# Patient Record
Sex: FEMALE | Race: BLACK OR AFRICAN AMERICAN | NOT HISPANIC OR LATINO | URBAN - METROPOLITAN AREA
[De-identification: names, ages, dates, MRNs, and addresses within clinical notes are randomized per-mention and may not be internally consistent; named-entity substitution may affect disease eponyms.]

---

## 2018-06-19 ENCOUNTER — EMERGENCY (EMERGENCY)
Facility: HOSPITAL | Age: 78
LOS: 1 days | Discharge: HOME | End: 2018-06-19
Attending: EMERGENCY MEDICINE | Admitting: EMERGENCY MEDICINE
Payer: MEDICARE

## 2018-06-19 VITALS
RESPIRATION RATE: 16 BRPM | DIASTOLIC BLOOD PRESSURE: 76 MMHG | SYSTOLIC BLOOD PRESSURE: 137 MMHG | OXYGEN SATURATION: 100 % | TEMPERATURE: 98 F | HEART RATE: 60 BPM

## 2018-06-19 PROCEDURE — 73562 X-RAY EXAM OF KNEE 3: CPT | Mod: 26,50

## 2018-06-19 PROCEDURE — 73590 X-RAY EXAM OF LOWER LEG: CPT | Mod: 26,RT

## 2018-06-19 PROCEDURE — 73610 X-RAY EXAM OF ANKLE: CPT | Mod: 26,RT

## 2018-06-19 PROCEDURE — 99284 EMERGENCY DEPT VISIT MOD MDM: CPT | Mod: GC

## 2018-06-19 NOTE — ED PROVIDER NOTE - MEDICAL DECISION MAKING DETAILS
78yo hx of htn and dementia presenting with right ankle and PRIYA knee pain this morning. Complaining of PRIYA knee pain after walking and subjective tenderness in right knee. Walking without assist. Likely benign vs muscular vs arthritis. Will xray PRIYA knees and right ankle and tib fib concern for fracture as patient has baseline dementia and is not great at explaining history and symptoms.

## 2018-06-19 NOTE — ED PROVIDER NOTE - ATTENDING CONTRIBUTION TO CARE
Patient presents with intermittent aching r. ankle pain and b/l knee pain that started since walking around yesterday. No trauma, no redness, no fevers, able to walk. Has h/o knee pain 2/2 arthritis in past but today felt worse than before, took tylenol prior to coming, now feeling better. Denies ha, cp, sob, abd pain, vomiting, diarrhea, dysuria, or fevers. HHA at bedside.  exam  GEN - NAD; well appearing; A+O x3   HEAD - NC/AT   EYES- PERRL, EOMI  ENT: Airway patent, mmm, Oral cavity and pharynx normal. No inflammation, swelling, exudate, or lesions.  NECK: Neck supple, non-tender without lymphadenopathy, no masses.  PULMONARY - CTA b/l, symmetric breath sounds.   CARDIAC -s1s2, RRR, no M,G,R  ABDOMEN - +BS, ND, NT, soft, no guarding, no rebound, no masses   BACK - no CVA tenderness, Normal  spine   EXTREMITIES - b/l le knee exams with from, no point ttp, no swelling, no overlying skin changes, ext mechanism intact, r. ankle with no swelling, no point ttp, from with mild pain, no crepitus, compartments soft, remainder of ext normal, symmetric pulses, capillary refill < 2 seconds, no edema   SKIN - no rash or bruising   NEUROLOGIC - alert, speech clear, no focal deficits  PSYCH -nl mood/affect, nl insight.  a/p-patient with atraumatic b/l knee pain and r. ankle pain.  mild reproducible pain on ranging, no other abnormal physical exam findings, pain improved, patient ambulatory, vss, will check xrs, likely ortho f/u.

## 2018-06-19 NOTE — ED PROVIDER NOTE - OBJECTIVE STATEMENT
76yo hx of htn and dementia presenting with right ankle and PRIYA knee pain this morning. Was walking fine yesterday, no issues, not needing assist or walker per aide. Lives at home, 24hr aide. This morning, stopped walking, complaining of pain in right knee and ankle. No witnessed fall, no recent trauma. No fevers, chills, otherwise acting normal, no other complaints, no urinary symptoms.

## 2018-06-19 NOTE — ED PROVIDER NOTE - PHYSICAL EXAMINATION
Gen: No acute distress, alert, cooperative  Head: Normocephalic, Atraumatic  HEENT: PERRL, normal conjunctiva  Lung: CTAB, no respiratory distress, no crackles or wheezes  CV: rrr, no murmur  Abd: soft, NTND, no rebound or guarding  MSK: No LE edema, no ankle or knee effusions PRIYA. Initially, no tenderness in ankles or knees, got patient up and walking and patient complains of PRIYA knee pain. Once laying down, complains of PRIYA knee tenderness diffusely, right worse than left. Full ROM PRIYA hips, knees, ankles. DP pulses intact  Neuro: No focal neurologic deficits, normal strength and sensation all extremities. Walking with normal gait, just more gingerly due to pain in knees per patient. Walking with no assist in room.   Skin: Warm and dry, no evidence of rash   Psych: normal affect, follows commands

## 2018-06-19 NOTE — ED ADULT TRIAGE NOTE - CHIEF COMPLAINT QUOTE
brought in by ambulance from home  A&OX3 c/o right ankle pain, as per aide pt was walking yesterday when she twisted it, no redness or swelling noted pt has hx of dementia

## 2018-06-19 NOTE — ED PROVIDER NOTE - PROGRESS NOTE DETAILS
All results d/w patient and hha, copies given with instructions to bring with them to their follow up appointment.  The patient was given verbal and written discharge instructions Specifically, instructions when to return to the ED and to seek follow-up from their pcp within 1-2 days. Any specialty follow-up was discussed, including how to make an appointment.  Instructions were discussed in simple, plain language and was understood by the patient. The patient understands that should their symptoms worsen or any new symptoms arise, they should return to the ED immediately for further evaluation.  Vss, NAD, tolerating po and ambulating steadily at discharge.

## 2018-09-11 ENCOUNTER — EMERGENCY (EMERGENCY)
Facility: HOSPITAL | Age: 78
LOS: 1 days | Discharge: HOME | End: 2018-09-11
Attending: EMERGENCY MEDICINE | Admitting: EMERGENCY MEDICINE
Payer: MEDICARE

## 2018-09-11 VITALS
HEART RATE: 96 BPM | SYSTOLIC BLOOD PRESSURE: 118 MMHG | TEMPERATURE: 98 F | DIASTOLIC BLOOD PRESSURE: 75 MMHG | RESPIRATION RATE: 18 BRPM

## 2018-09-11 PROCEDURE — 99283 EMERGENCY DEPT VISIT LOW MDM: CPT

## 2018-09-11 RX ORDER — POLYMYXIN B SULF/TRIMETHOPRIM 10000-1/ML
1 DROPS OPHTHALMIC (EYE) ONCE
Refills: 0 | Status: DISCONTINUED | OUTPATIENT
Start: 2018-09-11 | End: 2018-09-15

## 2018-09-11 NOTE — ED PROVIDER NOTE - MEDICAL DECISION MAKING DETAILS
79 y/o female with PMhx of Dementia, HTN, and HLD presenting to the ED with her aid for b/l eye redness and discharge. Concern for conjunctivitis. Antibiotics.

## 2018-09-11 NOTE — ED SUB INTERN NOTE - PHYSICAL EXAMINATION
Pt appears well, not in distress. No periorbital swelling or swelling of eyelids. Mild conjunctival redness noted, no discharge. Pupils round, reactive to light. EOM intact.

## 2018-09-11 NOTE — ED SUB INTERN NOTE - OBJECTIVE STATEMENT FT
Ms. Dunaway is a 77yo female with PMHx of HTN and dementia who presents today with b/l eye redness and discharge. Woke up with redness and discharge yesterday, denies any pain or trauma. C/o itchiness, denies vision changes, photophobia, fever, n/v, headache, dizziness, rhinorrhea, coughing. Wears reading glasses, does not wear contacts. Saw opthalmology months ago, exam normal. Denies sick contacts. Has not tried medications or eye drops.

## 2018-09-11 NOTE — ED PROVIDER NOTE - OBJECTIVE STATEMENT
79 y/o female with PMhx of Dementia, HTN, and HLD presenting to the ED with her aid for b/l eye redness and discharge. Aid notes pt has been having some crusting to left eye with clear drainage and then noted right today. Pt admits to itching of eye. Pt denies any vision changes. Pt denies any eye pain. Pt denies any fever, chills, nausea, vomiting, SOB, chest pain, or abd pain.

## 2018-09-11 NOTE — ED SUB INTERN NOTE - MEDICAL DECISION MAKING DETAILS
Ms. Dunaway is a 79yo female who presents today with eye redness and discharge for 2days. Most likely viral/bacterial conjunctivitis - b/l redness, discharge. Possible allergic conjunctivitis. Less likely keratitis or corneal abrasion - denies pain. Will treat with bactrim/polymyxin eye drops, encourage f/u with opthalmology.

## 2019-07-03 ENCOUNTER — EMERGENCY (EMERGENCY)
Facility: HOSPITAL | Age: 79
LOS: 1 days | Discharge: HOME | End: 2019-07-03
Attending: EMERGENCY MEDICINE | Admitting: EMERGENCY MEDICINE
Payer: MEDICARE

## 2019-07-03 VITALS
DIASTOLIC BLOOD PRESSURE: 70 MMHG | RESPIRATION RATE: 16 BRPM | HEART RATE: 65 BPM | SYSTOLIC BLOOD PRESSURE: 134 MMHG | OXYGEN SATURATION: 96 % | TEMPERATURE: 98 F

## 2019-07-03 PROCEDURE — 29125 APPL SHORT ARM SPLINT STATIC: CPT | Mod: RT

## 2019-07-03 PROCEDURE — 99283 EMERGENCY DEPT VISIT LOW MDM: CPT | Mod: 25

## 2019-07-03 PROCEDURE — 73130 X-RAY EXAM OF HAND: CPT | Mod: 26,RT

## 2019-07-03 NOTE — ED PROVIDER NOTE - PHYSICAL EXAMINATION
Attending/Caio: NAD, Head-atraumatic, no cspine PT; supple; RRR; CTAB; Abd-soft, NT/ND; A&Ox1, nonfocal; all joints FROM; Rt hand-+small eccyhmosis thenar, min STS, +swellng PIP1st digit, MCL intact, + cap refill < 2 sec; LUE-superficial abrasion, no STS

## 2019-07-03 NOTE — ED PROVIDER NOTE - OBJECTIVE STATEMENT
Attending/Caio: 79 yo F w/ h/o dementia, requires assistance with ADLs, HTN brought to the ED by her daughter for right hand bruising. Pt denies acute complaints, cannot provide history. No witnessed falls.

## 2019-07-03 NOTE — ED ADULT TRIAGE NOTE - CHIEF COMPLAINT QUOTE
Pt c/o right thumb swelling since this morning.  Pt unable to say if she had fall/trauma 2/2 dementia.

## 2019-07-03 NOTE — ED PROVIDER NOTE - PROGRESS NOTE DETAILS
Caio: reviewed xray results with Dr. Chowdhury covering hand. Agreed with thumb spica and will have the patient's daughter call his office this Friday morning for follow up Caio: discussed test results and dispo plan with patent's daughter

## 2019-07-03 NOTE — ED PROVIDER NOTE - CARE PROVIDER_API CALL
Donte Wolf (MD)  Plastic Surgery; Surgery; Surgical Critical Care  [address] [place] [address]  Phone: [phone]  Fax: [phone]  Follow Up Time:

## 2021-12-06 ENCOUNTER — INPATIENT (INPATIENT)
Facility: HOSPITAL | Age: 81
LOS: 6 days | Discharge: NURSING, REHAB OR LONG-TERM CARE | End: 2021-12-13
Attending: INTERNAL MEDICINE | Admitting: INTERNAL MEDICINE
Payer: MEDICARE

## 2021-12-06 VITALS
TEMPERATURE: 96 F | OXYGEN SATURATION: 84 % | SYSTOLIC BLOOD PRESSURE: 83 MMHG | DIASTOLIC BLOOD PRESSURE: 53 MMHG | HEART RATE: 104 BPM | RESPIRATION RATE: 18 BRPM

## 2021-12-06 DIAGNOSIS — N17.9 ACUTE KIDNEY FAILURE, UNSPECIFIED: ICD-10-CM

## 2021-12-06 DIAGNOSIS — F03.90 UNSPECIFIED DEMENTIA WITHOUT BEHAVIORAL DISTURBANCE: ICD-10-CM

## 2021-12-06 DIAGNOSIS — Z29.9 ENCOUNTER FOR PROPHYLACTIC MEASURES, UNSPECIFIED: ICD-10-CM

## 2021-12-06 DIAGNOSIS — A41.9 SEPSIS, UNSPECIFIED ORGANISM: ICD-10-CM

## 2021-12-06 DIAGNOSIS — Z98.890 OTHER SPECIFIED POSTPROCEDURAL STATES: Chronic | ICD-10-CM

## 2021-12-06 DIAGNOSIS — S70.229A: ICD-10-CM

## 2021-12-06 DIAGNOSIS — I63.9 CEREBRAL INFARCTION, UNSPECIFIED: ICD-10-CM

## 2021-12-06 DIAGNOSIS — E78.5 HYPERLIPIDEMIA, UNSPECIFIED: ICD-10-CM

## 2021-12-06 DIAGNOSIS — I10 ESSENTIAL (PRIMARY) HYPERTENSION: ICD-10-CM

## 2021-12-06 LAB
ALBUMIN SERPL ELPH-MCNC: 3.5 G/DL — SIGNIFICANT CHANGE UP (ref 3.3–5)
ALP SERPL-CCNC: 91 U/L — SIGNIFICANT CHANGE UP (ref 40–120)
ALT FLD-CCNC: 21 U/L — SIGNIFICANT CHANGE UP (ref 4–33)
ANION GAP SERPL CALC-SCNC: 14 MMOL/L — SIGNIFICANT CHANGE UP (ref 7–14)
APPEARANCE UR: CLEAR — SIGNIFICANT CHANGE UP
APTT BLD: 25.9 SEC — LOW (ref 27–36.3)
AST SERPL-CCNC: 30 U/L — SIGNIFICANT CHANGE UP (ref 4–32)
BACTERIA # UR AUTO: ABNORMAL
BASE EXCESS BLDV CALC-SCNC: -0.3 MMOL/L — SIGNIFICANT CHANGE UP (ref -2–3)
BASE EXCESS BLDV CALC-SCNC: 2.2 MMOL/L — SIGNIFICANT CHANGE UP (ref -2–3)
BASOPHILS # BLD AUTO: 0.03 K/UL — SIGNIFICANT CHANGE UP (ref 0–0.2)
BASOPHILS NFR BLD AUTO: 0.2 % — SIGNIFICANT CHANGE UP (ref 0–2)
BILIRUB SERPL-MCNC: 0.3 MG/DL — SIGNIFICANT CHANGE UP (ref 0.2–1.2)
BILIRUB UR-MCNC: NEGATIVE — SIGNIFICANT CHANGE UP
BLOOD GAS VENOUS COMPREHENSIVE RESULT: SIGNIFICANT CHANGE UP
BLOOD GAS VENOUS COMPREHENSIVE RESULT: SIGNIFICANT CHANGE UP
BUN SERPL-MCNC: 24 MG/DL — HIGH (ref 7–23)
CALCIUM SERPL-MCNC: 9.6 MG/DL — SIGNIFICANT CHANGE UP (ref 8.4–10.5)
CHLORIDE BLDV-SCNC: 106 MMOL/L — SIGNIFICANT CHANGE UP (ref 96–108)
CHLORIDE BLDV-SCNC: 106 MMOL/L — SIGNIFICANT CHANGE UP (ref 96–108)
CHLORIDE SERPL-SCNC: 105 MMOL/L — SIGNIFICANT CHANGE UP (ref 98–107)
CO2 BLDV-SCNC: 27.4 MMOL/L — HIGH (ref 22–26)
CO2 BLDV-SCNC: 30.8 MMOL/L — HIGH (ref 22–26)
CO2 SERPL-SCNC: 25 MMOL/L — SIGNIFICANT CHANGE UP (ref 22–31)
COLOR SPEC: YELLOW — SIGNIFICANT CHANGE UP
CREAT SERPL-MCNC: 1.7 MG/DL — HIGH (ref 0.5–1.3)
CRP SERPL-MCNC: 124.2 MG/L — HIGH
DIFF PNL FLD: ABNORMAL
EOSINOPHIL # BLD AUTO: 0.03 K/UL — SIGNIFICANT CHANGE UP (ref 0–0.5)
EOSINOPHIL NFR BLD AUTO: 0.2 % — SIGNIFICANT CHANGE UP (ref 0–6)
EPI CELLS # UR: 4 /HPF — SIGNIFICANT CHANGE UP (ref 0–5)
GAS PNL BLDV: 140 MMOL/L — SIGNIFICANT CHANGE UP (ref 136–145)
GAS PNL BLDV: 142 MMOL/L — SIGNIFICANT CHANGE UP (ref 136–145)
GAS PNL BLDV: SIGNIFICANT CHANGE UP
GLUCOSE BLDV-MCNC: 114 MG/DL — HIGH (ref 70–99)
GLUCOSE BLDV-MCNC: 139 MG/DL — HIGH (ref 70–99)
GLUCOSE SERPL-MCNC: 147 MG/DL — HIGH (ref 70–99)
GLUCOSE UR QL: NEGATIVE — SIGNIFICANT CHANGE UP
HCO3 BLDV-SCNC: 26 MMOL/L — SIGNIFICANT CHANGE UP (ref 22–29)
HCO3 BLDV-SCNC: 29 MMOL/L — SIGNIFICANT CHANGE UP (ref 22–29)
HCT VFR BLD CALC: 39 % — SIGNIFICANT CHANGE UP (ref 34.5–45)
HCT VFR BLDA CALC: 33 % — LOW (ref 34.5–46.5)
HCT VFR BLDA CALC: 40 % — SIGNIFICANT CHANGE UP (ref 34.5–46.5)
HGB BLD CALC-MCNC: 11 G/DL — LOW (ref 11.5–15.5)
HGB BLD CALC-MCNC: 13.3 G/DL — SIGNIFICANT CHANGE UP (ref 11.5–15.5)
HGB BLD-MCNC: 13.2 G/DL — SIGNIFICANT CHANGE UP (ref 11.5–15.5)
HYALINE CASTS # UR AUTO: 0 /LPF — SIGNIFICANT CHANGE UP (ref 0–7)
IANC: 15.86 K/UL — HIGH (ref 1.5–8.5)
IMM GRANULOCYTES NFR BLD AUTO: 0.4 % — SIGNIFICANT CHANGE UP (ref 0–1.5)
INR BLD: 1.12 RATIO — SIGNIFICANT CHANGE UP (ref 0.88–1.16)
KETONES UR-MCNC: NEGATIVE — SIGNIFICANT CHANGE UP
LACTATE BLDV-MCNC: 2.4 MMOL/L — HIGH (ref 0.5–2)
LACTATE BLDV-MCNC: 3.2 MMOL/L — HIGH (ref 0.5–2)
LEUKOCYTE ESTERASE UR-ACNC: ABNORMAL
LYMPHOCYTES # BLD AUTO: 1.81 K/UL — SIGNIFICANT CHANGE UP (ref 1–3.3)
LYMPHOCYTES # BLD AUTO: 9.3 % — LOW (ref 13–44)
MCHC RBC-ENTMCNC: 29.5 PG — SIGNIFICANT CHANGE UP (ref 27–34)
MCHC RBC-ENTMCNC: 33.8 GM/DL — SIGNIFICANT CHANGE UP (ref 32–36)
MCV RBC AUTO: 87.1 FL — SIGNIFICANT CHANGE UP (ref 80–100)
MONOCYTES # BLD AUTO: 1.56 K/UL — HIGH (ref 0–0.9)
MONOCYTES NFR BLD AUTO: 8.1 % — SIGNIFICANT CHANGE UP (ref 2–14)
NEUTROPHILS # BLD AUTO: 15.86 K/UL — HIGH (ref 1.8–7.4)
NEUTROPHILS NFR BLD AUTO: 81.8 % — HIGH (ref 43–77)
NITRITE UR-MCNC: POSITIVE
NRBC # BLD: 0 /100 WBCS — SIGNIFICANT CHANGE UP
NRBC # FLD: 0 K/UL — SIGNIFICANT CHANGE UP
PCO2 BLDV: 48 MMHG — HIGH (ref 39–42)
PCO2 BLDV: 54 MMHG — HIGH (ref 39–42)
PH BLDV: 7.34 — SIGNIFICANT CHANGE UP (ref 7.32–7.43)
PH BLDV: 7.34 — SIGNIFICANT CHANGE UP (ref 7.32–7.43)
PH UR: 6 — SIGNIFICANT CHANGE UP (ref 5–8)
PLATELET # BLD AUTO: 261 K/UL — SIGNIFICANT CHANGE UP (ref 150–400)
PO2 BLDV: 23 MMHG — SIGNIFICANT CHANGE UP
PO2 BLDV: 24 MMHG — SIGNIFICANT CHANGE UP
POTASSIUM BLDV-SCNC: 4.3 MMOL/L — SIGNIFICANT CHANGE UP (ref 3.5–5.1)
POTASSIUM BLDV-SCNC: 4.7 MMOL/L — SIGNIFICANT CHANGE UP (ref 3.5–5.1)
POTASSIUM SERPL-MCNC: 5 MMOL/L — SIGNIFICANT CHANGE UP (ref 3.5–5.3)
POTASSIUM SERPL-SCNC: 5 MMOL/L — SIGNIFICANT CHANGE UP (ref 3.5–5.3)
PROT SERPL-MCNC: 7.4 G/DL — SIGNIFICANT CHANGE UP (ref 6–8.3)
PROT UR-MCNC: ABNORMAL
PROTHROM AB SERPL-ACNC: 12.8 SEC — SIGNIFICANT CHANGE UP (ref 10.6–13.6)
RBC # BLD: 4.48 M/UL — SIGNIFICANT CHANGE UP (ref 3.8–5.2)
RBC # FLD: 15.1 % — HIGH (ref 10.3–14.5)
RBC CASTS # UR COMP ASSIST: 1 /HPF — SIGNIFICANT CHANGE UP (ref 0–4)
SAO2 % BLDV: 18.6 % — SIGNIFICANT CHANGE UP
SAO2 % BLDV: 29.9 % — SIGNIFICANT CHANGE UP
SARS-COV-2 RNA SPEC QL NAA+PROBE: SIGNIFICANT CHANGE UP
SODIUM SERPL-SCNC: 144 MMOL/L — SIGNIFICANT CHANGE UP (ref 135–145)
SP GR SPEC: >1.05 (ref 1–1.05)
UROBILINOGEN FLD QL: SIGNIFICANT CHANGE UP
WBC # BLD: 19.36 K/UL — HIGH (ref 3.8–10.5)
WBC # FLD AUTO: 19.36 K/UL — HIGH (ref 3.8–10.5)
WBC UR QL: 34 /HPF — HIGH (ref 0–5)

## 2021-12-06 PROCEDURE — 71045 X-RAY EXAM CHEST 1 VIEW: CPT | Mod: 26

## 2021-12-06 PROCEDURE — 74177 CT ABD & PELVIS W/CONTRAST: CPT | Mod: 26,MA

## 2021-12-06 PROCEDURE — 99223 1ST HOSP IP/OBS HIGH 75: CPT | Mod: GC,AI

## 2021-12-06 PROCEDURE — 99285 EMERGENCY DEPT VISIT HI MDM: CPT

## 2021-12-06 RX ORDER — PIPERACILLIN AND TAZOBACTAM 4; .5 G/20ML; G/20ML
3.38 INJECTION, POWDER, LYOPHILIZED, FOR SOLUTION INTRAVENOUS EVERY 12 HOURS
Refills: 0 | Status: DISCONTINUED | OUTPATIENT
Start: 2021-12-06 | End: 2021-12-07

## 2021-12-06 RX ORDER — PIPERACILLIN AND TAZOBACTAM 4; .5 G/20ML; G/20ML
3.38 INJECTION, POWDER, LYOPHILIZED, FOR SOLUTION INTRAVENOUS ONCE
Refills: 0 | Status: COMPLETED | OUTPATIENT
Start: 2021-12-06 | End: 2021-12-06

## 2021-12-06 RX ORDER — SODIUM CHLORIDE 9 MG/ML
1000 INJECTION, SOLUTION INTRAVENOUS
Refills: 0 | Status: DISCONTINUED | OUTPATIENT
Start: 2021-12-06 | End: 2021-12-07

## 2021-12-06 RX ORDER — HEPARIN SODIUM 5000 [USP'U]/ML
5000 INJECTION INTRAVENOUS; SUBCUTANEOUS EVERY 8 HOURS
Refills: 0 | Status: DISCONTINUED | OUTPATIENT
Start: 2021-12-06 | End: 2021-12-09

## 2021-12-06 RX ORDER — PIPERACILLIN AND TAZOBACTAM 4; .5 G/20ML; G/20ML
3.38 INJECTION, POWDER, LYOPHILIZED, FOR SOLUTION INTRAVENOUS EVERY 8 HOURS
Refills: 0 | Status: DISCONTINUED | OUTPATIENT
Start: 2021-12-06 | End: 2021-12-06

## 2021-12-06 RX ORDER — SODIUM CHLORIDE 9 MG/ML
1000 INJECTION INTRAMUSCULAR; INTRAVENOUS; SUBCUTANEOUS ONCE
Refills: 0 | Status: COMPLETED | OUTPATIENT
Start: 2021-12-06 | End: 2021-12-06

## 2021-12-06 RX ORDER — VANCOMYCIN HCL 1 G
1000 VIAL (EA) INTRAVENOUS ONCE
Refills: 0 | Status: COMPLETED | OUTPATIENT
Start: 2021-12-06 | End: 2021-12-06

## 2021-12-06 RX ORDER — INFLUENZA VIRUS VACCINE 15; 15; 15; 15 UG/.5ML; UG/.5ML; UG/.5ML; UG/.5ML
0.7 SUSPENSION INTRAMUSCULAR ONCE
Refills: 0 | Status: DISCONTINUED | OUTPATIENT
Start: 2021-12-06 | End: 2021-12-13

## 2021-12-06 RX ADMIN — SODIUM CHLORIDE 1000 MILLILITER(S): 9 INJECTION INTRAMUSCULAR; INTRAVENOUS; SUBCUTANEOUS at 13:53

## 2021-12-06 RX ADMIN — PIPERACILLIN AND TAZOBACTAM 200 GRAM(S): 4; .5 INJECTION, POWDER, LYOPHILIZED, FOR SOLUTION INTRAVENOUS at 15:32

## 2021-12-06 RX ADMIN — Medication 100 MILLIGRAM(S): at 13:54

## 2021-12-06 RX ADMIN — Medication 250 MILLIGRAM(S): at 17:34

## 2021-12-06 NOTE — H&P ADULT - PROBLEM SELECTOR PLAN 4
- Holding home amlodipine, carvedilol, ramipril i/s/o sepsis Patient with remote history of CVA  - Holding home simvastatin pending swallow eval

## 2021-12-06 NOTE — H&P ADULT - PROBLEM SELECTOR PLAN 2
Patient with history of dementia, AOx1 at baseline  - Holding home memantine pending swallow eval Unclear baseline, today with Cr 1.70  - Send urine Na, Cr  - Spot urine protein/creatinine ratio  - Trend BMP Unclear baseline, today with Cr 1.70  - Send urine Na, Cr  - Spot urine protein/creatinine ratio  - Trend BMP  - Strict Is/Os

## 2021-12-06 NOTE — PATIENT PROFILE ADULT - FALL HARM RISK - HARM RISK INTERVENTIONS

## 2021-12-06 NOTE — H&P ADULT - NSICDXPASTMEDICALHX_GEN_ALL_CORE_FT
PAST MEDICAL HISTORY:  Dementia     HLD (hyperlipidemia)     Hypertension      PAST MEDICAL HISTORY:  CVA (cerebrovascular accident) 2006    Dementia AOx1 at baseline    HLD (hyperlipidemia)     Hypertension

## 2021-12-06 NOTE — ED PROVIDER NOTE - ATTENDING CONTRIBUTION TO CARE
I have personally performed a face to face medical and diagnostic evaluation of the patient. I have discussed with and reviewed the fellow's note and agree with the History, ROS, Physical Exam and MDM unless otherwise indicated. A brief summary of my personal evaluation and impression can be found below.    80yo F hx dementia (AAOx1, minimally communicative at baseline) p/w increased lethargy and dec interactiveness x several days per aide and also developing sores/blisters to b/l hips sacrum x several days that were not present before. No falls.  VITALS: Initial triage and subsequent vitals have been reviewed by me.  Gen: AAOx0, moving spontaneously, intermittently looking around room, NAD, non-toxic  Head: NCAT  HEENT: MMM, normal conjunctiva, anicteric, neck supple  Lung: CTAB, no adventitious sounds  CV: RRR, no murmurs, 2+symmetric peripheral pulses  Abd: soft, NTND, no rebound or guarding, no palpable masses  MSK: No edema, no visible deformities  Neuro: Moving all extremity grossly,   Skin: sacrum slightly erythematous/dark/dusky appearance?, R hip/buttock w/ opened blister and small 1cm eschar, no crepitus  Inc AMS w/ new skin lesions and fever in ED, clinically does not appear highly suspicion for nec fasc but given new lesions w/ blister/eschar over several days, AMS and fever will get labs, surg eval, CT to eval deep sace infx, and empiric abx

## 2021-12-06 NOTE — ED PROVIDER NOTE - OBJECTIVE STATEMENT
81 Yr old female libra Gunderson Dementia, per aide presents with episodic drowsiness for past 1 week.   Patient also has developed sores on her back over past weekend.  Denies noticing SOB, fever, Chest pain, cough, abdominal pain, altered bowel movements, increased freq. of urination.   No past similar history.

## 2021-12-06 NOTE — H&P ADULT - PROBLEM SELECTOR PLAN 3
Patient with remote history of CVA  - Holding home simvastatin pending swallow eval Patient with history of dementia, AOx1 at baseline  - Holding home memantine pending swallow eval

## 2021-12-06 NOTE — H&P ADULT - NSHPPHYSICALEXAM_GEN_ALL_CORE
VITALS:   T(C): 36.8 (12-06-21 @ 12:45), Max: 38.2 (12-06-21 @ 12:35)  HR: 94 (12-06-21 @ 12:45) (94 - 110)  BP: 126/73 (12-06-21 @ 12:45) (83/53 - 129/79)  RR: 19 (12-06-21 @ 12:45) (18 - 20)  SpO2: 100% (12-06-21 @ 12:45) (84% - 100%)    GENERAL: NAD, lying in bed comfortably  HEAD:  Atraumatic, Normocephalic  EYES: EOMI, PERRLA, conjunctiva and sclera clear  ENT: Moist mucous membranes  NECK: Supple, No JVD  CHEST/LUNG: Clear to auscultation bilaterally; No rales, rhonchi, wheezing, or rubs. Unlabored respirations  HEART: Regular rate and rhythm; No murmurs, rubs, or gallops  ABDOMEN: BSx4; Soft, nontender, nondistended  EXTREMITIES:  2+ Peripheral Pulses, brisk capillary refill. No clubbing, cyanosis, or edema  NERVOUS SYSTEM:  A&Ox3, no focal deficits   SKIN: No rashes or lesions  Psych: Normal speech, normal behavior, normal affect VITALS:   T(C): 36.8 (12-06-21 @ 12:45), Max: 38.2 (12-06-21 @ 12:35)  HR: 94 (12-06-21 @ 12:45) (94 - 110)  BP: 126/73 (12-06-21 @ 12:45) (83/53 - 129/79)  RR: 19 (12-06-21 @ 12:45) (18 - 20)  SpO2: 100% (12-06-21 @ 12:45) (84% - 100%)    GENERAL: NAD, lying in bed comfortably  HEAD:  Atraumatic, Normocephalic  EYES: EOMI, PERRLA, conjunctiva and sclera clear  ENT: Moist mucous membranes  NECK: Supple, No JVD  CHEST/LUNG: Clear to auscultation bilaterally; No rales, rhonchi, wheezing, or rubs. Unlabored respirations  HEART: Regular rate and rhythm; Faint systolic murmur noted  ABDOMEN: BSx4; Soft, nontender, nondistended  EXTREMITIES:  2+ Peripheral Pulses, brisk capillary refill. No clubbing, cyanosis, or edema; pain on palpation of b/l calves; wounds on b/l thighs dressed, c/d/i  NERVOUS SYSTEM:  A&Ox1, no focal deficits   SKIN: No rashes or lesions  Psych: Normal speech, normal behavior, normal affect

## 2021-12-06 NOTE — ED ADULT TRIAGE NOTE - CHIEF COMPLAINT QUOTE
Pt brought by HHA for lethargy the past week with hip swelling. Pt arousable to painful stimuli, non verbal in triage and brought to room 20. PMH dementia and verbal at baseline

## 2021-12-06 NOTE — ED ADULT TRIAGE NOTE - RESPIRATORY RATE (BREATHS/MIN)
Call your healthcare provider right away if you get any of the following signs or symptoms of bleeding problems:   * Pain, color, or temperature changes to any part of your body   * Headaches, dizziness or weakness   * Unusual bruising (unexplained or growing in size)   * Nosebleeds   * Bleeding gums   * Bleeding from cuts that take a long time to stop   * Menstrual bleeding or vaginal bleeding that is heavier than normal   * Pink or brown urine   * Red or black stools   * Coughing up blood   * Vomiting blood or material that looks like coffee grounds    Update Anticoagulation Clinic (Coumadin Clinic) with any of the following:   * Medication changes (new, stopped or dose changes, this includes over-the-counter medications and supplements)   * Planning on having any surgery, medical or dental procedures   * Diet changes   * Falls  (you should go to Emergency Department immediately for evaluation, then notify Anticoagulation Clinic)    Please, do not wait until your next appointment to update us on changes.        
18

## 2021-12-06 NOTE — H&P ADULT - PROBLEM SELECTOR PLAN 7
DVT ppx: SQH  Diet: NPO pending swallow eval  Dispo: pending medical management    FULL CODE  HCP: Daughter Alma Rosa [phone]

## 2021-12-06 NOTE — ED PROVIDER NOTE - PHYSICAL EXAMINATION
PHYSICAL EXAM:    GENERAL: NAD, lying in bed comfortably  HEAD:  Atraumatic, Normocephalic  EYES: EOMI, PERRLA, conjunctiva and sclera clear  ENT: No erythema/pallor/petechiae/lesions; TMs clear b/l  NECK: Supple, No JVD  LUNG: CTA b/l; no r/r/w  HEART: RRR, +S1/S2; No m/r/g  ABDOMEN: soft, NT/ND; BS audible   EXTREMITIES:  2+ Peripheral Pulses, brisk cap refill. No clubbing, cyanosis, or edema  NERVOUS SYSTEM:  AAOx3, speech clear. No sensory/motor deficits   SKIN: Grade-I [place] BL gluteal region, and sacrum.

## 2021-12-06 NOTE — ED ADULT NURSE NOTE - OBJECTIVE STATEMENT
Michael RN- Received pt in room 20. pt A&OX0, normally verbal at baseline. aide at bedside. pt with hx of HTN, HLD, dementia. As per aide pt here for lethargy and hip swelling x a few days.  pt arrives arousable to painful stimuli, moaning, but not speaking. pt reports no complaints at this time. pt placed on cardiac monitor. airway patent, respirations are equal and nonlabored, no respiratory distress noted, sating 99% on room air. vitals as noted. Pt febrile rectally. pt with redness to the buttock and left hip. pt with eschar covered with bandage to the right hip. 20 gauge iv placed in the right ac, sepsis labs sent. pt stable, safety maintained. side rails up. will endorse report to primary RN Ladi for further plan.

## 2021-12-06 NOTE — H&P ADULT - NSHPREVIEWOFSYSTEMS_GEN_ALL_CORE
REVIEW OF SYSTEMS:    CONSTITUTIONAL: No weakness, fevers or chills  EYES/ENT: No visual changes;  No vertigo or throat pain   NECK: No pain or stiffness  RESPIRATORY: No cough, wheezing, hemoptysis; No shortness of breath  CARDIOVASCULAR: No chest pain or palpitations  GASTROINTESTINAL: No abdominal or epigastric pain. No nausea, vomiting, or hematemesis; No diarrhea or constipation. No melena or hematochezia.  GENITOURINARY: No dysuria, frequency or hematuria  NEUROLOGICAL: No numbness or weakness  SKIN: No itching, burning, rashes, or lesions   HEME: no easy bruising or unexplained bleeding  ENDO: no heat intolerance, no cold intolerance  PSYCH: no SI, or depression  All other review of systems is negative unless indicated above. REVIEW OF SYSTEMS:    CONSTITUTIONAL: +weakness, +fevers +chills  RESPIRATORY: No cough, wheezing, hemoptysis; No shortness of breath  CARDIOVASCULAR: No chest pain or palpitations  GASTROINTESTINAL: No abdominal or epigastric pain. No nausea, vomiting, or hematemesis; No diarrhea or constipation. No melena or hematochezia.  GENITOURINARY: No dysuria, frequency or hematuria  SKIN: +new sacral, hip wounds  All other review of systems is negative unless indicated above.

## 2021-12-06 NOTE — H&P ADULT - PROBLEM SELECTOR PLAN 1
Patient admitted with lethargy, found to meet SIRS criteria by WBC, HR, temperature on admission  With new skin lesions on b/l hips and back, no other localizing symptoms  CRP elevated 124.2  12/6 CTAP revealed right hip soft tissue defect with subcutaneous infiltration and edema of the right hip and medial gluteal subcutaneous tissues. Rim-enhancing collection overlying the left greater trochanter with adjacent subcutaneous edema. Differential includes bursitis or infection.   Likely i/s/o soft tissue infection vs. prosthetic joint infection  - F/u blood cultures  - F/u urinalysis, urine culture  - Clindamycin (12/6-)  - Zosyn (12/6-)  - Vancomycin (12/6-)  - MRSA swab  - Surgery consulted for R gluteal wound, f/u recs  - Ortho consult  - ID consult  - Trend WBC, fever curve  - Tylenol prn fever Patient admitted with lethargy, found to meet SIRS criteria by WBC, HR, temperature on admission  With new skin lesions on b/l hips and back, no other localizing symptoms  CRP elevated 124.2, lactate 2.4  12/6 CTAP revealed right hip soft tissue defect with subcutaneous infiltration and edema of the right hip and medial gluteal subcutaneous tissues. Rim-enhancing collection overlying the left greater trochanter with adjacent subcutaneous edema. Differential includes bursitis or infection.   Likely i/s/o soft tissue infection vs. prosthetic joint infection  - F/u blood cultures  - F/u urinalysis, urine culture  - LR 75cc/hr x 12 hr  - Clindamycin (12/6-)  - Zosyn (12/6-)  - Vancomycin (12/6-)  - MRSA swab  - Surgery consulted for R gluteal wound, f/u recs  - Ortho consult  - ID consult  - Trend WBC, fever curve  - Tylenol prn fever Patient admitted with lethargy, found to meet SIRS criteria by WBC, HR, temperature on admission  With new skin lesions on b/l hips and back, no other localizing symptoms  CRP elevated 124.2, lactate 2.4  12/6 CTAP revealed right hip soft tissue defect with subcutaneous infiltration and edema of the right hip and medial gluteal subcutaneous tissues. Rim-enhancing collection overlying the left greater trochanter with adjacent subcutaneous edema. Differential includes bursitis or infection.   Likely i/s/o soft tissue infection vs. prosthetic joint infection  - F/u blood cultures  - F/u urinalysis, urine culture  - LR 75cc/hr x 12 hr  - Clindamycin (12/6-)  - Zosyn (12/6-)  - MRSA swab  - Surgery consulted for R gluteal wound, f/u recs  - Ortho consult  - ID consult  - Trend WBC, fever curve  - Tylenol prn fever Patient admitted with lethargy, found to meet SIRS criteria by WBC, HR, temperature on admission  With new skin lesions on b/l hips and back, no other localizing symptoms  CRP elevated 124.2, lactate 2.4  12/6 CTAP revealed right hip soft tissue defect with subcutaneous infiltration and edema of the right hip and medial gluteal subcutaneous tissues. Rim-enhancing collection overlying the left greater trochanter with adjacent subcutaneous edema. Differential includes bursitis or infection.   Likely i/s/o soft tissue infection vs. prosthetic joint infection  - F/u blood cultures  - F/u urinalysis, urine culture  - LR 75cc/hr x 12 hr  - Vancomycin (12/6-)  - Zosyn (12/6-)  - MRSA swab  - Surgery consulted for R gluteal wound, f/u recs  - Ortho consult  - ID consult  - Trend WBC, fever curve  - Tylenol prn fever

## 2021-12-06 NOTE — ED ADULT NURSE REASSESSMENT NOTE - NS ED NURSE REASSESS COMMENT FT1
break rn: pt resting comfortably in bed at this time, denies complaints. fluids infusing at this time as per MD orders. respirations even and unlabored. airway patent. will continue to monitor.

## 2021-12-06 NOTE — H&P ADULT - NSHPSOCIALHISTORY_GEN_ALL_CORE
Patient is a retired LPN. Switches off residence between both daughters, has assistance from [employer]. Daughter denies alcohol, tobacco, or illicit drug use

## 2021-12-06 NOTE — H&P ADULT - ASSESSMENT
Ms. Dunaway is an 80 yo F with a history of HTN, CVA w/o focal residual deficits, Alzheimer's dementia (AOx1 at baseline) who presents with lethargy over the past week likely i/s/o sepsis 2/2 hip infection.

## 2021-12-06 NOTE — H&P ADULT - HISTORY OF PRESENT ILLNESS
Ms. Dunaway is an 80 yo F with a history of Alzheimer's dementia (AOx1 at baseline) who presents with lethargy over the past week. Per the patient's home health aid, she noticed an abnormality in the patient's R hip for which she visited her primary care physician, who did not recommend further workup. Over the past few days, she has developed worsening sore in the area of the R hip as well as a blister on the L hip and the sacral area. Aside from these worsening wounds and lethargy, she states that the patient has not had any other symptoms. She denies fevers, chills, shortness of breath, chest pain, cough, abdominal pain, N/V/D, changes in urination.    On presentation to the ED, the patient was febrile to 100.8, tachycardic to 104. /79/ respirations 20, O2 sat 99% on room air. Her labs were significant for WBC 19.36, BUN/Cr 24/1.70. CXR showed clear lungs, CTAP revealed s/p R femoral ORIF, right hip soft tissue defect with subcutaneous infiltration and edema of the right hip and medial gluteal subcutaneous tissues. Rim-enhancing collection overlying the left greater trochanter with adjacent subcutaneous edema. Differential includes bursitis or infection. Pelvic MRI may be helpful for further evaluation. She received clindamycin, Zosyn, and vancomycin    Ms. 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 Per the patient's home health aid, she noticed an abnormality in the patient's R hip for which she visited her primary care physician, who did not recommend further workup. Over the past few days, she has developed worsening sore in the area of the R hip as well as a blister on the L hip and the sacral area. Aside from these worsening wounds and lethargy, she states that the patient has not had any other symptoms. She denies fevers, chills, shortness of breath, chest pain, cough, abdominal pain, N/V/D, changes in urination.    On presentation to the ED, the patient was febrile to 100.8, tachycardic to 104. /79/ respirations 20, O2 sat 99% on room air. Her labs were significant for WBC 19.36, BUN/Cr 24/1.70. CXR showed clear lungs, CTAP revealed s/p R femoral ORIF, right hip soft tissue defect with subcutaneous infiltration and edema of the right hip and medial gluteal subcutaneous tissues. Rim-enhancing collection overlying the left greater trochanter with adjacent subcutaneous edema. Differential includes bursitis or infection. Pelvic MRI may be helpful for further evaluation. She received clindamycin, Zosyn, and vancomycin & a 1L bolus of NS and was admitted to medicine for further management.   Ms. 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 Per the patient's home health aid, she noticed an abnormality in the patient's R hip for which she visited her primary care physician, who did not recommend further workup. Over the past few days, she has developed worsening sore in the area of the R hip as well as a blister on the L hip and the sacral area. Aside from these worsening wounds and lethargy, she states that the patient has not had any other symptoms. She denies fevers, chills, shortness of breath, chest pain, cough, abdominal pain, N/V/D, changes in urination.    On presentation to the ED, the patient was febrile to 100.8, tachycardic to 104. Hypotensive to 83/53 respirations 20, O2 sat 99% on room air. Her labs were significant for WBC 19.36, BUN/Cr 24/1.70. CXR showed clear lungs, CTAP revealed s/p R femoral ORIF, right hip soft tissue defect with subcutaneous infiltration and edema of the right hip and medial gluteal subcutaneous tissues. Rim-enhancing collection overlying the left greater trochanter with adjacent subcutaneous edema. Differential includes bursitis or infection. Pelvic MRI may be helpful for further evaluation. She received clindamycin, Zosyn, and vancomycin & a 1L bolus of NS and was admitted to medicine for further management.

## 2021-12-06 NOTE — H&P ADULT - NSICDXPASTSURGICALHX_GEN_ALL_CORE_FT
PAST SURGICAL HISTORY:  No significant past surgical history      PAST SURGICAL HISTORY:  S/P ORIF (open reduction internal fixation) fracture

## 2021-12-06 NOTE — H&P ADULT - PROBLEM SELECTOR PLAN 5
- Holding home simvastatin pending swallow eval - Holding home amlodipine, carvedilol, ramipril i/s/o sepsis

## 2021-12-06 NOTE — ED PROVIDER NOTE - CLINICAL SUMMARY MEDICAL DECISION MAKING FREE TEXT BOX
81 Yr old female libra Barkleyhiemer Dementia, per aide presents with episodic drowsiness for past 1 week. Vs wnl. PE AAO x 1 o/w wnl. Plan for labs, imaging, +/- admission.

## 2021-12-06 NOTE — H&P ADULT - PROBLEM SELECTOR PLAN 6
DVT ppx: SQH  Diet: NPO pending swallow eval  Dispo: pending medical management    FULL CODE  HCP: Daughter Alma Rosa [phone] - Holding home simvastatin pending swallow eval

## 2021-12-07 DIAGNOSIS — N39.0 URINARY TRACT INFECTION, SITE NOT SPECIFIED: ICD-10-CM

## 2021-12-07 LAB
-  COAGULASE NEGATIVE STAPHYLOCOCCUS: SIGNIFICANT CHANGE UP
ALBUMIN SERPL ELPH-MCNC: 3 G/DL — LOW (ref 3.3–5)
ALP SERPL-CCNC: 82 U/L — SIGNIFICANT CHANGE UP (ref 40–120)
ALT FLD-CCNC: 20 U/L — SIGNIFICANT CHANGE UP (ref 4–33)
ANION GAP SERPL CALC-SCNC: 16 MMOL/L — HIGH (ref 7–14)
AST SERPL-CCNC: 28 U/L — SIGNIFICANT CHANGE UP (ref 4–32)
BASOPHILS # BLD AUTO: 0.03 K/UL — SIGNIFICANT CHANGE UP (ref 0–0.2)
BASOPHILS NFR BLD AUTO: 0.2 % — SIGNIFICANT CHANGE UP (ref 0–2)
BILIRUB SERPL-MCNC: 0.4 MG/DL — SIGNIFICANT CHANGE UP (ref 0.2–1.2)
BUN SERPL-MCNC: 23 MG/DL — SIGNIFICANT CHANGE UP (ref 7–23)
CALCIUM SERPL-MCNC: 9.2 MG/DL — SIGNIFICANT CHANGE UP (ref 8.4–10.5)
CHLORIDE SERPL-SCNC: 109 MMOL/L — HIGH (ref 98–107)
CO2 SERPL-SCNC: 21 MMOL/L — LOW (ref 22–31)
CREAT ?TM UR-MCNC: 112 MG/DL — SIGNIFICANT CHANGE UP
CREAT SERPL-MCNC: 1.19 MG/DL — SIGNIFICANT CHANGE UP (ref 0.5–1.3)
CULTURE RESULTS: SIGNIFICANT CHANGE UP
EOSINOPHIL # BLD AUTO: 0.14 K/UL — SIGNIFICANT CHANGE UP (ref 0–0.5)
EOSINOPHIL NFR BLD AUTO: 1 % — SIGNIFICANT CHANGE UP (ref 0–6)
ERYTHROCYTE [SEDIMENTATION RATE] IN BLOOD: 50 MM/HR — HIGH (ref 4–25)
GLUCOSE SERPL-MCNC: 74 MG/DL — SIGNIFICANT CHANGE UP (ref 70–99)
GRAM STN FLD: SIGNIFICANT CHANGE UP
HCT VFR BLD CALC: 32.8 % — LOW (ref 34.5–45)
HGB BLD-MCNC: 11 G/DL — LOW (ref 11.5–15.5)
IANC: 9.26 K/UL — HIGH (ref 1.5–8.5)
IMM GRANULOCYTES NFR BLD AUTO: 0.3 % — SIGNIFICANT CHANGE UP (ref 0–1.5)
LACTATE SERPL-SCNC: 2.1 MMOL/L — HIGH (ref 0.5–2)
LYMPHOCYTES # BLD AUTO: 2.94 K/UL — SIGNIFICANT CHANGE UP (ref 1–3.3)
LYMPHOCYTES # BLD AUTO: 21.8 % — SIGNIFICANT CHANGE UP (ref 13–44)
MAGNESIUM SERPL-MCNC: 2.4 MG/DL — SIGNIFICANT CHANGE UP (ref 1.6–2.6)
MCHC RBC-ENTMCNC: 29.4 PG — SIGNIFICANT CHANGE UP (ref 27–34)
MCHC RBC-ENTMCNC: 33.5 GM/DL — SIGNIFICANT CHANGE UP (ref 32–36)
MCV RBC AUTO: 87.7 FL — SIGNIFICANT CHANGE UP (ref 80–100)
METHOD TYPE: SIGNIFICANT CHANGE UP
MONOCYTES # BLD AUTO: 1.1 K/UL — HIGH (ref 0–0.9)
MONOCYTES NFR BLD AUTO: 8.1 % — SIGNIFICANT CHANGE UP (ref 2–14)
MRSA PCR RESULT.: SIGNIFICANT CHANGE UP
NEUTROPHILS # BLD AUTO: 9.26 K/UL — HIGH (ref 1.8–7.4)
NEUTROPHILS NFR BLD AUTO: 68.6 % — SIGNIFICANT CHANGE UP (ref 43–77)
NRBC # BLD: 0 /100 WBCS — SIGNIFICANT CHANGE UP
NRBC # FLD: 0 K/UL — SIGNIFICANT CHANGE UP
PHOSPHATE SERPL-MCNC: 4.4 MG/DL — SIGNIFICANT CHANGE UP (ref 2.5–4.5)
PLATELET # BLD AUTO: 215 K/UL — SIGNIFICANT CHANGE UP (ref 150–400)
POTASSIUM SERPL-MCNC: 4.3 MMOL/L — SIGNIFICANT CHANGE UP (ref 3.5–5.3)
POTASSIUM SERPL-SCNC: 4.3 MMOL/L — SIGNIFICANT CHANGE UP (ref 3.5–5.3)
PROT ?TM UR-MCNC: 24 MG/DL — SIGNIFICANT CHANGE UP
PROT SERPL-MCNC: 6.6 G/DL — SIGNIFICANT CHANGE UP (ref 6–8.3)
PROT/CREAT UR-RTO: 0.2 RATIO — SIGNIFICANT CHANGE UP (ref 0–0.2)
RBC # BLD: 3.74 M/UL — LOW (ref 3.8–5.2)
RBC # FLD: 15.1 % — HIGH (ref 10.3–14.5)
S AUREUS DNA NOSE QL NAA+PROBE: SIGNIFICANT CHANGE UP
SODIUM SERPL-SCNC: 146 MMOL/L — HIGH (ref 135–145)
SODIUM UR-SCNC: 22 MMOL/L — SIGNIFICANT CHANGE UP
SPECIMEN SOURCE: SIGNIFICANT CHANGE UP
VANCOMYCIN FLD-MCNC: 6.7 UG/ML — SIGNIFICANT CHANGE UP
WBC # BLD: 13.51 K/UL — HIGH (ref 3.8–10.5)
WBC # FLD AUTO: 13.51 K/UL — HIGH (ref 3.8–10.5)

## 2021-12-07 PROCEDURE — 99221 1ST HOSP IP/OBS SF/LOW 40: CPT | Mod: GC

## 2021-12-07 PROCEDURE — 99222 1ST HOSP IP/OBS MODERATE 55: CPT | Mod: GC

## 2021-12-07 PROCEDURE — 99233 SBSQ HOSP IP/OBS HIGH 50: CPT | Mod: GC

## 2021-12-07 PROCEDURE — 99222 1ST HOSP IP/OBS MODERATE 55: CPT

## 2021-12-07 RX ORDER — FLUPHENAZINE HYDROCHLORIDE 1 MG/1
1 TABLET, FILM COATED ORAL DAILY
Refills: 0 | Status: DISCONTINUED | OUTPATIENT
Start: 2021-12-07 | End: 2021-12-13

## 2021-12-07 RX ORDER — DONEPEZIL HYDROCHLORIDE 10 MG/1
10 TABLET, FILM COATED ORAL AT BEDTIME
Refills: 0 | Status: DISCONTINUED | OUTPATIENT
Start: 2021-12-07 | End: 2021-12-13

## 2021-12-07 RX ORDER — SIMVASTATIN 20 MG/1
40 TABLET, FILM COATED ORAL AT BEDTIME
Refills: 0 | Status: DISCONTINUED | OUTPATIENT
Start: 2021-12-07 | End: 2021-12-13

## 2021-12-07 RX ORDER — SENNA PLUS 8.6 MG/1
2 TABLET ORAL AT BEDTIME
Refills: 0 | Status: DISCONTINUED | OUTPATIENT
Start: 2021-12-07 | End: 2021-12-09

## 2021-12-07 RX ORDER — VANCOMYCIN HCL 1 G
1000 VIAL (EA) INTRAVENOUS ONCE
Refills: 0 | Status: COMPLETED | OUTPATIENT
Start: 2021-12-07 | End: 2021-12-07

## 2021-12-07 RX ORDER — SODIUM CHLORIDE 9 MG/ML
1000 INJECTION, SOLUTION INTRAVENOUS
Refills: 0 | Status: DISCONTINUED | OUTPATIENT
Start: 2021-12-07 | End: 2021-12-08

## 2021-12-07 RX ORDER — PIPERACILLIN AND TAZOBACTAM 4; .5 G/20ML; G/20ML
3.38 INJECTION, POWDER, LYOPHILIZED, FOR SOLUTION INTRAVENOUS EVERY 8 HOURS
Refills: 0 | Status: DISCONTINUED | OUTPATIENT
Start: 2021-12-07 | End: 2021-12-10

## 2021-12-07 RX ORDER — POLYETHYLENE GLYCOL 3350 17 G/17G
17 POWDER, FOR SOLUTION ORAL ONCE
Refills: 0 | Status: COMPLETED | OUTPATIENT
Start: 2021-12-07 | End: 2021-12-07

## 2021-12-07 RX ADMIN — PIPERACILLIN AND TAZOBACTAM 25 GRAM(S): 4; .5 INJECTION, POWDER, LYOPHILIZED, FOR SOLUTION INTRAVENOUS at 10:58

## 2021-12-07 RX ADMIN — PIPERACILLIN AND TAZOBACTAM 25 GRAM(S): 4; .5 INJECTION, POWDER, LYOPHILIZED, FOR SOLUTION INTRAVENOUS at 22:14

## 2021-12-07 RX ADMIN — POLYETHYLENE GLYCOL 3350 17 GRAM(S): 17 POWDER, FOR SOLUTION ORAL at 18:08

## 2021-12-07 RX ADMIN — DONEPEZIL HYDROCHLORIDE 10 MILLIGRAM(S): 10 TABLET, FILM COATED ORAL at 22:21

## 2021-12-07 RX ADMIN — HEPARIN SODIUM 5000 UNIT(S): 5000 INJECTION INTRAVENOUS; SUBCUTANEOUS at 09:34

## 2021-12-07 RX ADMIN — HEPARIN SODIUM 5000 UNIT(S): 5000 INJECTION INTRAVENOUS; SUBCUTANEOUS at 00:11

## 2021-12-07 RX ADMIN — Medication 250 MILLIGRAM(S): at 18:08

## 2021-12-07 RX ADMIN — FLUPHENAZINE HYDROCHLORIDE 1 MILLIGRAM(S): 1 TABLET, FILM COATED ORAL at 14:32

## 2021-12-07 RX ADMIN — SODIUM CHLORIDE 75 MILLILITER(S): 9 INJECTION, SOLUTION INTRAVENOUS at 15:16

## 2021-12-07 RX ADMIN — HEPARIN SODIUM 5000 UNIT(S): 5000 INJECTION INTRAVENOUS; SUBCUTANEOUS at 15:16

## 2021-12-07 RX ADMIN — SODIUM CHLORIDE 75 MILLILITER(S): 9 INJECTION, SOLUTION INTRAVENOUS at 22:08

## 2021-12-07 RX ADMIN — SENNA PLUS 2 TABLET(S): 8.6 TABLET ORAL at 22:21

## 2021-12-07 RX ADMIN — PIPERACILLIN AND TAZOBACTAM 25 GRAM(S): 4; .5 INJECTION, POWDER, LYOPHILIZED, FOR SOLUTION INTRAVENOUS at 00:11

## 2021-12-07 RX ADMIN — SIMVASTATIN 40 MILLIGRAM(S): 20 TABLET, FILM COATED ORAL at 22:21

## 2021-12-07 NOTE — CONSULT NOTE ADULT - ASSESSMENT
Assessment: Ms. 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 UTI. Right trochanter unstagable pressure injury with no acute s/s of cellulitis, no drainable collection, no indication for sharp debridement at this time.     Plan:  Right trochanter unstagable pressure injury:  -Cleanse wound and periwound skin with NS. Pat dry. Apply Liquid barrier film to periwound skin. Apply medihoney gel to wound base. Cover with silicone foam with border. Change daily.  -As per CT scan and clinical assessment would recommend further imaging as clinically indicated, no acute s/s of cellulitis on exam today. Area of induration without fluctuance. No drainable collection, unable to express purulence.   -Continue to offload pressure  -Nutrition consult, patient with >stage 2 pressure injury, alzheimers dementia, limited mobility.    Sacrum mixed etiology moisture and incontinence associated dermatitis complicated by stage 2 pressure injury:  -Provider perineal care every shift and prn with episodes of incontinence.  -Apply Cavilon Advanced Skin Protectant people soft #[id number] three times a week.  -Continue to offload pressure  -Nutrition consult, patient with >stage 2 pressure injury, alzheimers dementia, limited mobility.    Care as per primary team, will follow w/ you    Upon discharge f/u as outpatient at [facility] [address] [place], [place] [phone]  Seen w/ Dr. Snyder and D/w team    Thank you for this consult  SHABBIR Keyes, CWOCN (pager #[phone])    If after 4PM or before 7:30AM on Mon-Friday or weekend/holiday please contact general surgery for urgent matters.   Team A- [id number]   Team B- [id number]  For non-urgent matters e-mail [email or web address]    We spent 70 minutes face to face with this patient of which more than 50% of the time was spent counseling & coordinating care of this pt

## 2021-12-07 NOTE — CONSULT NOTE ADULT - SUBJECTIVE AND OBJECTIVE BOX
Wound SURGERY CONSULT NOTE    HPI:  Ms. 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 Per the patient's home health aid, she noticed an abnormality in the patient's R hip for which she visited her primary care physician, who did not recommend further workup. Over the past few days, she has developed worsening sore in the area of the R hip as well as a blister on the L hip and the sacral area. Aside from these worsening wounds and lethargy, she states that the patient has not had any other symptoms. She denies fevers, chills, shortness of breath, chest pain, cough, abdominal pain, N/V/D, changes in urination. On presentation to the ED, the patient was febrile to 100.8, tachycardic to 104. Hypotensive to 83/53 respirations 20, O2 sat 99% on room air. Her labs were significant for WBC 19.36, BUN/Cr 24/1.70. CXR showed clear lungs, CTAP revealed s/p R femoral ORIF, right hip soft tissue defect with subcutaneous infiltration and edema of the right hip and medial gluteal subcutaneous tissues. Rim-enhancing collection overlying the left greater trochanter with adjacent subcutaneous edema. Differential includes bursitis or infection. Pelvic MRI may be helpful for further evaluation. She received clindamycin, Zosyn, and vancomycin & a 1L bolus of NS and was admitted to medicine for further management. Wound consult requested to assist w/ management of right trochanter and sacral pressure injuries.     Current Diet: Soft and bite size, mildly thick liquids, low sodium.    PAST MEDICAL & SURGICAL HISTORY:  Dementia  AOx1 at baseline    Hypertension    HLD (hyperlipidemia)    CVA (cerebrovascular accident)  2006    S/P ORIF (open reduction internal fixation) fracture        REVIEW OF SYSTEMS: Pt unable to offer    MEDICATIONS  (STANDING):  donepezil 10 milliGRAM(s) Oral at bedtime  fluPHENAZine 1 milliGRAM(s) Oral daily  heparin   Injectable 5000 Unit(s) SubCutaneous every 8 hours  influenza  Vaccine (HIGH DOSE) 0.7 milliLiter(s) IntraMuscular once  lactated ringers. 1000 milliLiter(s) (75 mL/Hr) IV Continuous <Continuous>  piperacillin/tazobactam IVPB.. 3.375 Gram(s) IV Intermittent every 8 hours  polyethylene glycol 3350 17 Gram(s) Oral once  senna 2 Tablet(s) Oral at bedtime  simvastatin 40 milliGRAM(s) Oral at bedtime  vancomycin  IVPB 1000 milliGRAM(s) IV Intermittent once    MEDICATIONS  (PRN):      Allergies    No Known Drug Allergies  shellfish (Swelling)    Intolerances    SOCIAL HISTORY:  Patient is a retired LPN. Switches off residence between both daughters, has assistance from [employer]. Daughter denies alcohol, tobacco, or illicit drug use    FAMILY HISTORY:      PHYSICAL EXAM:  Vital Signs Last 24 Hrs  T(C): 36.3 (07 Dec 2021 12:56), Max: 36.9 (06 Dec 2021 22:41)  T(F): 97.3 (07 Dec 2021 12:56), Max: 98.4 (06 Dec 2021 22:41)  HR: 83 (07 Dec 2021 12:56) (83 - 98)  BP: 120/65 (07 Dec 2021 12:56) (100/60 - 120/65)  BP(mean): --  RR: 18 (07 Dec 2021 12:56) (17 - 18)  SpO2: 98% (07 Dec 2021 12:56) (98% - 100%)  BMI: 22.2kg/m2    Constitutional: NAD,  A&Ox1, awake and alert, minimally verbal  Appears in good hygiene.   (+) low airloss support surface, (+) positioning devices, (+) complete cair boots  HEENT:  NC/AT, PERRL, mucosa moist, throat clear, trachea midline, neck supple  Cardiovascular: RRR   Respiratory: CTA, on RA  Gastrointestinal: soft NT/ND (+)BS, incontinent of stool  : incontinent of urine, external female urinary collection device in place  Neurology:  weakened strength & sensation, difficulty following commands  Musculoskeletal:  limited, rigid bilateral lower legs, requires two person assist for turning and positioning.  Vascular: BLE equally warm, capillary refill < 3 seconds  +2 DP/PT pulses palpable, no edema noted.  Skin:  moist w/ good turgor, frail  Right trochanter- unstagable pressure injury- [id number]- well defined irregular borders. Wound base 100% brown-tan minimally moist firmly attached slough. Induration beneath site of injury extending 1cm circumferentially around wound borders, with mild edema. Periwound skin without erythema and/or increased warmth. Periwound without fluctuance, unable to express purulent drainage/no drainable collection.   Sacrum- mixed etiology- moisture and incontinence associated dermatitis, stage 2 pressure injury- [id number], fluid filled blister to left upper inner buttock, hypopigmentation within sacral fold. No palpable skin changes noted.      LABS/ CULTURES/ RADIOLOGY:                        11.0   13.51 )-----------( 215      ( 07 Dec 2021 08:23 )             32.8       146  |  109  |  23  ----------------------------<  74      [12-07-21 @ 08:23]  4.3   |  21  |  1.19        Ca     9.2     [12-07-21 @ 08:23]      Mg     2.40     [12-07-21 @ 08:23]      Phos  4.4     [12-07-21 @ 08:23]    TPro  6.6  /  Alb  3.0  /  TBili  0.4  /  DBili  x   /  AST  28  /  ALT  20  /  AlkPhos  82  [12-07-21 @ 08:23]    PT/INR: PT 12.8 , INR 1.12       [12-06-21 @ 12:59]  PTT: 25.9       [12-06-21 @ 12:59]      Culture - Blood (collected 12-06-21 @ 14:39)  Source: .Blood Blood-Peripheral  Preliminary Report (12-07-21 @ 15:02):    No growth to date.    Culture - Blood (collected 12-06-21 @ 14:39)  Source: .Blood Blood-Peripheral  Preliminary Report (12-07-21 @ 15:02):    No growth to date.      < from: CT Abdomen and Pelvis w/ IV Cont (12.06.21 @ 14:47) >    EXAM:  CT ABDOMEN AND PELVIS IC        PROCEDURE DATE:  Dec  6 2021         INTERPRETATION:  CLINICAL INFORMATION: Sacral wound infection.    COMPARISON: None.    CONTRAST/COMPLICATIONS:  IV Contrast: Omnipaque 350  90 cc administered   10 cc discarded  Oral Contrast: NONE  Complications: None reported at time of study completion    PROCEDURE:  CT of the Abdomen and Pelvis was performed.  Sagittal and coronal reformats were performed.    FINDINGS:  LOWER CHEST: Within normal limits.    LIVER: Within normal limits.  BILE DUCTS: Normal caliber.  GALLBLADDER: Cholelithiasis.  SPLEEN: Within normal limits.  PANCREAS: Within normal limits.  ADRENALS: Within normal limits.  KIDNEYS/URETERS: No hydronephrosis. Nonobstructing 4 mm calculus in the left lower pole. Subcentimeter right renal hypodensities too small to characterize.    BLADDER: Within normal limits.  REPRODUCTIVE ORGANS: Calcified fibroid uterus. Central low-attenuation in the uterus, question endometrial thickening.    BOWEL: No bowel obstruction. Appendix is not visualized. Diffuse colonic diverticulosis. Rectum distended with stool.  PERITONEUM: No ascites.  VESSELS: Atherosclerotic changes.  RETROPERITONEUM/LYMPH NODES: No lymphadenopathy.  ABDOMINAL WALL: Right hip soft tissue defect with subcutaneous infiltration and edema of the right hip and medial gluteal subcutaneous tissues. Adjacent streak artifact from right hip prosthesis limits evaluation. Rim-enhancing collection overlying the greater trochanter. Adjacent subcutaneous infiltration.  BONES: Degenerative changes. Grade 1 anterolisthesis of L4 on L5. Status post right femoral ORIF.    IMPRESSION:  Status post right femoral ORIF. Right hip soft tissue defect with subcutaneous infiltration and edema of the right hip and medial gluteal subcutaneous tissues.    Rim-enhancing collection overlying the left greater trochanter with adjacent subcutaneous edema. Differential includes bursitis or infection. Pelvic MRI may be helpful for further evaluation.    Fibroid uterus. Question endometrial thickening. Nonemergent pelvic ultrasound can be performed for further evaluation.    --- End of Report ---              JULIANE MENDIOLA MD; Attending Radiologist  This document has been electronically signed. Dec  6 2021  4:04PM    < end of copied text >

## 2021-12-07 NOTE — PROGRESS NOTE ADULT - PROBLEM SELECTOR PLAN 2
Patient with UA significant for nitrite, LE, WBC, bacteria  - F/u urine culture  - Continue empiric Zosyn renal dosing (12/6-)

## 2021-12-07 NOTE — SWALLOW BEDSIDE ASSESSMENT ADULT - SWALLOW EVAL: DIAGNOSIS
1) Mild oral dysphagia for regular solids marked by reduced mastication and bolus formation with delayed A-P transport. 2) Functional oral phase for thin, mildly and moderately thick liquids, pureed, minced and moist and soft and bite size marked by adequate oral aperture, bolus manipulation and A-P transport. Oral cavity was clear after the primary swallow, patient occasionally utilized a lingual sweep to ensure oral cavity was clear 3) Moderate pharyngeal dysphagia for thin liquids marked by initiation of pharyngeal swallow with hyolaryngeal elevation and excursion upon digital palpation. Immediate and prolonged cough response for thin liquids indicative of laryngeal penetration/aspiration. 4) Functional pharyngeal phase for mildly and moderately thick liquids, pureed, minced and moist, and soft and bite size solids marked by initiation of pharyngeal swallow with hyolaryngeal elevation and excursion upon digital palpation. No evidence of airway invasion on these consistencies.

## 2021-12-07 NOTE — CONSULT NOTE ADULT - ASSESSMENT
A/P: 81 F s/p R prox femur ORIF 2017 at OSH with concern for rule-out hardware infection    Pain control  WBAT  Needs XR R femur and pelvis  Consider MRI pelvis to better evaluate wound and fluid collection  Consider IR consult for aspiration of fluid collection  Antibiotics per primary team  Appreciate care per primary team  Case discussed with Dr. Dotson A/P: 81 F s/p R prox femur ORIF 2017 at OSH with concern for rule-out hardware infection    Pain control  WBAT  Needs XR R femur and pelvis  Consider IR consult for aspiration of fluid collection  Antibiotics per primary team  Appreciate care per primary team  Case discussed with Dr. Dotson A/P: 81 F s/p Right prox femur ORIF 2017 at OSH with concern for rule-out hardware infection    Pain control  WBAT  Needs XR Right femur and pelvis  Consider IR consult for aspiration of fluid collection to contralateral (Left) hip  Antibiotics per primary team  Appreciate care per primary team  Case discussed with Dr. Dotson

## 2021-12-07 NOTE — PROGRESS NOTE ADULT - SUBJECTIVE AND OBJECTIVE BOX
PROGRESS NOTE:   Authored by Brody Goncalves MD  Pager: [facility] [phone]; [facility] [phone]    Patient is a 81y old  Female who presents with a chief complaint of Lethargy (06 Dec 2021 18:50)      SUBJECTIVE / OVERNIGHT EVENTS:    ADDITIONAL REVIEW OF SYSTEMS:    MEDICATIONS  (STANDING):  heparin   Injectable 5000 Unit(s) SubCutaneous every 8 hours  influenza  Vaccine (HIGH DOSE) 0.7 milliLiter(s) IntraMuscular once  lactated ringers. 1000 milliLiter(s) (75 mL/Hr) IV Continuous <Continuous>  piperacillin/tazobactam IVPB.. 3.375 Gram(s) IV Intermittent every 12 hours    MEDICATIONS  (PRN):      CAPILLARY BLOOD GLUCOSE        I&O's Summary    06 Dec 2021 07:01  -  07 Dec 2021 07:00  --------------------------------------------------------  IN: 0 mL / OUT: 300 mL / NET: -300 mL        PHYSICAL EXAM:  Vital Signs Last 24 Hrs  T(C): 36.4 (07 Dec 2021 05:56), Max: 38.2 (06 Dec 2021 12:35)  T(F): 97.5 (07 Dec 2021 05:56), Max: 100.8 (06 Dec 2021 12:35)  HR: 86 (07 Dec 2021 05:56) (86 - 110)  BP: 113/70 (07 Dec 2021 05:56) (83/53 - 129/79)  BP(mean): --  RR: 17 (07 Dec 2021 05:56) (17 - 20)  SpO2: 99% (07 Dec 2021 05:56) (84% - 100%)    GENERAL: No acute distress, well-developed  HEAD:  Atraumatic, Normocephalic  NECK: Supple, no JVD  CHEST/LUNG: CTAB; No wheezes, rales, or rhonchi  HEART: Regular rate and rhythm; Soft systolic murmur noted  ABDOMEN: Soft, non-tender, non-distended; normal bowel sounds  EXTREMITIES:  2+ peripheral pulses b/l, No clubbing, cyanosis, or edema  NEUROLOGY: A&O x 1, no focal deficits  SKIN: Wounds on b/l hips & sacral area covered    LABS:                        13.2   19.36 )-----------( 261      ( 06 Dec 2021 12:59 )             39.0     12-06    144  |  105  |  24<H>  ----------------------------<  147<H>  5.0   |  25  |  1.70<H>    Ca    9.6      06 Dec 2021 12:59    TPro  7.4  /  Alb  3.5  /  TBili  0.3  /  DBili  x   /  AST  30  /  ALT  21  /  AlkPhos  91  12-06    PT/INR - ( 06 Dec 2021 12:59 )   PT: 12.8 sec;   INR: 1.12 ratio         PTT - ( 06 Dec 2021 12:59 )  PTT:25.9 sec      Urinalysis Basic - ( 06 Dec 2021 20:11 )    Color: Yellow / Appearance: Clear / SG: >1.050 / pH: x  Gluc: x / Ketone: Negative  / Bili: Negative / Urobili: <2 mg/dL   Blood: x / Protein: Trace / Nitrite: Positive   Leuk Esterase: Large / RBC: 1 /HPF / WBC 34 /HPF   Sq Epi: x / Non Sq Epi: 4 /HPF / Bacteria: Moderate PROGRESS NOTE:   Authored by Brody Goncalves MD  Pager: [facility] [phone]; [facility] [phone]    Patient is a 81y old  Female who presents with a chief complaint of Lethargy (06 Dec 2021 18:50)      SUBJECTIVE / OVERNIGHT EVENTS:  No acute events overnight. Patient oriented x1 this morning.       ADDITIONAL REVIEW OF SYSTEMS:    MEDICATIONS  (STANDING):  heparin   Injectable 5000 Unit(s) SubCutaneous every 8 hours  influenza  Vaccine (HIGH DOSE) 0.7 milliLiter(s) IntraMuscular once  lactated ringers. 1000 milliLiter(s) (75 mL/Hr) IV Continuous <Continuous>  piperacillin/tazobactam IVPB.. 3.375 Gram(s) IV Intermittent every 12 hours    MEDICATIONS  (PRN):      CAPILLARY BLOOD GLUCOSE        I&O's Summary    06 Dec 2021 07:01  -  07 Dec 2021 07:00  --------------------------------------------------------  IN: 0 mL / OUT: 300 mL / NET: -300 mL        PHYSICAL EXAM:  Vital Signs Last 24 Hrs  T(C): 36.4 (07 Dec 2021 05:56), Max: 38.2 (06 Dec 2021 12:35)  T(F): 97.5 (07 Dec 2021 05:56), Max: 100.8 (06 Dec 2021 12:35)  HR: 86 (07 Dec 2021 05:56) (86 - 110)  BP: 113/70 (07 Dec 2021 05:56) (83/53 - 129/79)  BP(mean): --  RR: 17 (07 Dec 2021 05:56) (17 - 20)  SpO2: 99% (07 Dec 2021 05:56) (84% - 100%)    GENERAL: No acute distress, well-developed  HEAD:  Atraumatic, Normocephalic  NECK: Supple, no JVD  CHEST/LUNG: CTAB; No wheezes, rales, or rhonchi  HEART: Regular rate and rhythm; Soft systolic murmur noted  ABDOMEN: Soft, non-tender, non-distended; normal bowel sounds  EXTREMITIES:  2+ peripheral pulses b/l, No clubbing, cyanosis, or edema  NEUROLOGY: A&O x 1, no focal deficits  SKIN: Wounds on b/l hips & sacral area covered    LABS:                        13.2   19.36 )-----------( 261      ( 06 Dec 2021 12:59 )             39.0     12-06    144  |  105  |  24<H>  ----------------------------<  147<H>  5.0   |  25  |  1.70<H>    Ca    9.6      06 Dec 2021 12:59    TPro  7.4  /  Alb  3.5  /  TBili  0.3  /  DBili  x   /  AST  30  /  ALT  21  /  AlkPhos  91  12-06    PT/INR - ( 06 Dec 2021 12:59 )   PT: 12.8 sec;   INR: 1.12 ratio         PTT - ( 06 Dec 2021 12:59 )  PTT:25.9 sec      Urinalysis Basic - ( 06 Dec 2021 20:11 )    Color: Yellow / Appearance: Clear / SG: >1.050 / pH: x  Gluc: x / Ketone: Negative  / Bili: Negative / Urobili: <2 mg/dL   Blood: x / Protein: Trace / Nitrite: Positive   Leuk Esterase: Large / RBC: 1 /HPF / WBC 34 /HPF   Sq Epi: x / Non Sq Epi: 4 /HPF / Bacteria: Moderate PROGRESS NOTE:   Authored by Brody Goncalves MD  Pager: [facility] [phone]; [facility] [phone]    Patient is a 81y old  Female who presents with a chief complaint of Lethargy (06 Dec 2021 18:50)      SUBJECTIVE / OVERNIGHT EVENTS:  No acute events overnight. Patient oriented x1 this morning.   Difficult to assess ROS due to patient's orientation this morning, but she denies new complaints.    ADDITIONAL REVIEW OF SYSTEMS:  Difficult to assess due to mental status    MEDICATIONS  (STANDING):  heparin   Injectable 5000 Unit(s) SubCutaneous every 8 hours  influenza  Vaccine (HIGH DOSE) 0.7 milliLiter(s) IntraMuscular once  lactated ringers. 1000 milliLiter(s) (75 mL/Hr) IV Continuous <Continuous>  piperacillin/tazobactam IVPB.. 3.375 Gram(s) IV Intermittent every 12 hours    MEDICATIONS  (PRN):      CAPILLARY BLOOD GLUCOSE        I&O's Summary    06 Dec 2021 07:01  -  07 Dec 2021 07:00  --------------------------------------------------------  IN: 0 mL / OUT: 300 mL / NET: -300 mL        PHYSICAL EXAM:  Vital Signs Last 24 Hrs  T(C): 36.4 (07 Dec 2021 05:56), Max: 38.2 (06 Dec 2021 12:35)  T(F): 97.5 (07 Dec 2021 05:56), Max: 100.8 (06 Dec 2021 12:35)  HR: 86 (07 Dec 2021 05:56) (86 - 110)  BP: 113/70 (07 Dec 2021 05:56) (83/53 - 129/79)  BP(mean): --  RR: 17 (07 Dec 2021 05:56) (17 - 20)  SpO2: 99% (07 Dec 2021 05:56) (84% - 100%)    GENERAL: No acute distress, well-developed  HEAD:  Atraumatic, Normocephalic  NECK: Supple, no JVD  CHEST/LUNG: CTAB; No wheezes, rales, or rhonchi  HEART: Regular rate and rhythm; Soft systolic murmur noted  ABDOMEN: Soft, non-tender, non-distended; normal bowel sounds  EXTREMITIES:  2+ peripheral pulses b/l, No clubbing, cyanosis, or edema  NEUROLOGY: A&O x 1, no focal deficits  SKIN: Wounds on b/l hips & sacral area covered    LABS:                        13.2   19.36 )-----------( 261      ( 06 Dec 2021 12:59 )             39.0     12-06    144  |  105  |  24<H>  ----------------------------<  147<H>  5.0   |  25  |  1.70<H>    Ca    9.6      06 Dec 2021 12:59    TPro  7.4  /  Alb  3.5  /  TBili  0.3  /  DBili  x   /  AST  30  /  ALT  21  /  AlkPhos  91  12-06    PT/INR - ( 06 Dec 2021 12:59 )   PT: 12.8 sec;   INR: 1.12 ratio         PTT - ( 06 Dec 2021 12:59 )  PTT:25.9 sec      Urinalysis Basic - ( 06 Dec 2021 20:11 )    Color: Yellow / Appearance: Clear / SG: >1.050 / pH: x  Gluc: x / Ketone: Negative  / Bili: Negative / Urobili: <2 mg/dL   Blood: x / Protein: Trace / Nitrite: Positive   Leuk Esterase: Large / RBC: 1 /HPF / WBC 34 /HPF   Sq Epi: x / Non Sq Epi: 4 /HPF / Bacteria: Moderate

## 2021-12-07 NOTE — PROGRESS NOTE ADULT - PROBLEM SELECTOR PLAN 1
Patient admitted with lethargy, found to meet SIRS criteria by WBC, HR, temperature on admission  With new skin lesions on b/l hips and back, no other localizing symptoms  CRP elevated 124.2, lactate 2.4  12/6 CTAP revealed right hip soft tissue defect with subcutaneous infiltration and edema of the right hip and medial gluteal subcutaneous tissues. Rim-enhancing collection overlying the left greater trochanter with adjacent subcutaneous edema. Differential includes bursitis or infection.   Likely i/s/o soft tissue infection vs. prosthetic joint infection vs. UTI  - F/u blood cultures  - F/u urinalysis, urine culture  - LR 75cc/hr x 12 hr  - Vancomycin per CrCl (12/6-)  - Zosyn renal dosing (12/6-)  - MRSA swab  - Surgery consulted for R gluteal wound, f/u recs  - Ortho consult  - ID consult  - Trend WBC, fever curve  - Tylenol prn fever Patient admitted with lethargy, found to meet SIRS criteria by WBC, HR, temperature on admission  With new skin lesions on b/l hips and back, no other localizing symptoms  CRP elevated 124.2, lactate 2.4  12/6 CTAP revealed right hip soft tissue defect with subcutaneous infiltration and edema of the right hip and medial gluteal subcutaneous tissues. Rim-enhancing collection overlying the left greater trochanter with adjacent subcutaneous edema. Differential includes bursitis or infection.   UA with nitrite, LE, WBC, bacteria  Likely i/s/o soft tissue infection vs. prosthetic joint infection vs. UTI  S/p LR 75cc/hr x12 hours, lactate downtrended  - F/u blood cultures  - F/u urine culture  - LR 75cc/hr x 12 hr  - Vancomycin per CrCl (12/6-)  - Zosyn renal dosing (12/6-)  - MRSA swab, f/u results  - Wound care MD team consulted, surgery consulted by ED  - Ortho consulted, appreciate recs  - ID consulted, appreciate recs  - Trend WBC, fever curve  - Tylenol prn fever

## 2021-12-07 NOTE — PROGRESS NOTE ADULT - PROBLEM SELECTOR PLAN 8
DVT ppx: SQH  Diet: NPO pending swallow eval  Dispo: pending medical management    FULL CODE  HCP: Daughter Alma Rosa [phone] DVT ppx: SQH  Diet: Soft & bite sized, mildly thick liquids  Dispo: pending medical management    FULL CODE  HCP: Daughter Alma Rosa [phone]

## 2021-12-07 NOTE — PROGRESS NOTE ADULT - PROBLEM SELECTOR PLAN 3
Unclear baseline, today with Cr 1.70  - Send urine Na, Cr  - Spot urine protein/creatinine ratio  - Trend BMP  - Strict Is/Os Unclear baseline, today with Cr 1.70  Downtrended today to 1.19 s/p LR  FeNa 0.2%, pre-renal cause  - Continue IVF today  - Trend BMP  - Strict Is/Os

## 2021-12-07 NOTE — CONSULT NOTE ADULT - SUBJECTIVE AND OBJECTIVE BOX
Ortho Progress Note  CHACE YARBROUGH   MRN-[id number]    81yFemale is a/w lethargy and sepsis w/u presents w/bilateral hip skin breakdown as well as sacrum.  Pt denies any pain but very limited history and physical as patient not fully cooperative.  HHA at bedside, states patient amb at baseline w/walker and ambulating all up to Sunday.  HHA brought in Monday (yesterday) for skin blisters rupturing.  Per HHA, patient was eval by PMD for b/l skin blisters to hip but NTD.  Per primary team, patient had R prox femur ORIF in [place] 2017.  Resting comfortably, NAD, Alert and awake.    Vital Signs Last 24 Hrs  T(C): 36.4 (07 Dec 2021 05:56), Max: 38.2 (06 Dec 2021 12:35)  T(F): 97.5 (07 Dec 2021 05:56), Max: 100.8 (06 Dec 2021 12:35)  HR: 86 (07 Dec 2021 05:56) (86 - 110)  BP: 113/70 (07 Dec 2021 05:56) (100/60 - 129/79)  BP(mean): --  RR: 17 (07 Dec 2021 05:56) (17 - 20)  SpO2: 99% (07 Dec 2021 05:56) (99% - 100%)  No Known Drug Allergies  shellfish (Swelling)      S/P R prox femur ORIF  B/L hips w/ mepilex wound dressings - able to see superficial healing blisters that have ruptured  swelling noted more to L femur compare to Right  though tenderness through R femur  no erythema noted to skin BLE  motor exam somewhat difficult as patient fighting during exam - able to wiggles toes/ankles B/L  sensation BLE intact to light touch                            11.0   13.51 )-----------( 215      ( 07 Dec 2021 08:23 )             32.8     12-07    146<H>  |  109<H>  |  23  ----------------------------<  74  4.3   |  21<L>  |  1.19    Ca    9.2      07 Dec 2021 08:23  Phos  4.4     12-07  Mg     2.40     12-07    TPro  6.6  /  Alb  3.0<L>  /  TBili  0.4  /  DBili  x   /  AST  28  /  ALT  20  /  AlkPhos  82  12-07          PT/INR - ( 06 Dec 2021 12:59 )   PT: 12.8 sec;   INR: 1.12 ratio    PTT - ( 06 Dec 2021 12:59 )  PTT:25.9 sec        XR R femur and pelvis pending  ESR pending    < from: CT Abdomen and Pelvis w/ IV Cont (12.06.21 @ 14:47) >  EXAM:  CT ABDOMEN AND PELVIS IC        PROCEDURE DATE:  Dec  6 2021         INTERPRETATION:  CLINICAL INFORMATION: Sacral wound infection.    COMPARISON: None.    CONTRAST/COMPLICATIONS:  IV Contrast: Omnipaque 350  90 cc administered   10 cc discarded  Oral Contrast: NONE  Complications: None reported at time of study completion    PROCEDURE:  CT of the Abdomen and Pelvis was performed.  Sagittal and coronal reformats were performed.    IMPRESSION:  Status post right femoral ORIF. Right hip soft tissue defect with subcutaneous infiltration and edema of the right hip and medial gluteal subcutaneous tissues.    Rim-enhancing collection overlying the left greater trochanter with adjacent subcutaneous edema. Differential includes bursitis or infection. Pelvic MRI may be helpful for further evaluation.    Fibroid uterus. Question endometrial thickening. Nonemergent pelvic ultrasound can be performed for further evaluation.    < end of copied text >      A/P Ortho Stable s/p R prox femur ORIF 2017 at O[facility]  ck ESR  await XR R femur and pelvis  most likely R prox femur HW not infected  continue supportive therapy and med w/u for sepsis  will d/w Dr. Dotson once XRs are completed Ortho Progress Note  CHACE YARBROUGH   MRN-[id number]    81yFemale is a/w lethargy and sepsis w/u presents w/bilateral hip skin breakdown as well as sacrum.  Pt denies any pain but very limited history and physical as patient not fully cooperative.  HHA at bedside, states patient amb at baseline w/walker and ambulating all up to Sunday.  HHA brought in Monday (yesterday) for skin blisters rupturing.  Per HHA, patient was eval by PMD for b/l skin blisters to hip but NTD.  Per primary team, patient had R prox femur ORIF in [place] 2017.  Resting comfortably, NAD, Alert and awake.    Vital Signs Last 24 Hrs  T(C): 36.4 (07 Dec 2021 05:56), Max: 38.2 (06 Dec 2021 12:35)  T(F): 97.5 (07 Dec 2021 05:56), Max: 100.8 (06 Dec 2021 12:35)  HR: 86 (07 Dec 2021 05:56) (86 - 110)  BP: 113/70 (07 Dec 2021 05:56) (100/60 - 129/79)  BP(mean): --  RR: 17 (07 Dec 2021 05:56) (17 - 20)  SpO2: 99% (07 Dec 2021 05:56) (99% - 100%)  No Known Drug Allergies  shellfish (Swelling)      S/P R prox femur ORIF  B/L hips w/ mepilex wound dressings - able to see superficial healing blisters that have ruptured  swelling noted more to L femur compare to Right  though tenderness through R femur  no erythema noted to skin BLE  motor exam somewhat difficult as patient fighting during exam - able to wiggles toes/ankles B/L  sensation BLE intact to light touch                            11.0   13.51 )-----------( 215      ( 07 Dec 2021 08:23 )             32.8     12-07    146<H>  |  109<H>  |  23  ----------------------------<  74  4.3   |  21<L>  |  1.19    Ca    9.2      07 Dec 2021 08:23  Phos  4.4     12-07  Mg     2.40     12-07    TPro  6.6  /  Alb  3.0<L>  /  TBili  0.4  /  DBili  x   /  AST  28  /  ALT  20  /  AlkPhos  82  12-07          PT/INR - ( 06 Dec 2021 12:59 )   PT: 12.8 sec;   INR: 1.12 ratio    PTT - ( 06 Dec 2021 12:59 )  PTT:25.9 sec        XR R femur and pelvis pending  ESR pending    < from: CT Abdomen and Pelvis w/ IV Cont (12.06.21 @ 14:47) >  EXAM:  CT ABDOMEN AND PELVIS IC        PROCEDURE DATE:  Dec  6 2021         INTERPRETATION:  CLINICAL INFORMATION: Sacral wound infection.    COMPARISON: None.    CONTRAST/COMPLICATIONS:  IV Contrast: Omnipaque 350  90 cc administered   10 cc discarded  Oral Contrast: NONE  Complications: None reported at time of study completion    PROCEDURE:  CT of the Abdomen and Pelvis was performed.  Sagittal and coronal reformats were performed.    IMPRESSION:  Status post right femoral ORIF. Right hip soft tissue defect with subcutaneous infiltration and edema of the right hip and medial gluteal subcutaneous tissues.    Rim-enhancing collection overlying the left greater trochanter with adjacent subcutaneous edema. Differential includes bursitis or infection. Pelvic MRI may be helpful for further evaluation.    Fibroid uterus. Question endometrial thickening. Nonemergent pelvic ultrasound can be performed for further evaluation.    < end of copied text >

## 2021-12-07 NOTE — CONSULT NOTE ADULT - SUBJECTIVE AND OBJECTIVE BOX
Patient is a 81y old  Female who presents with a chief complaint of Lethargy (07 Dec 2021 12:19)    HPI:  Ms. 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 Per the patient's home health aid, she noticed an abnormality in the patient's R hip for which she visited her primary care physician, who did not recommend further workup. Over the past few days, she has developed worsening sore in the area of the R hip as well as a blister on the L hip and the sacral area. Aside from these worsening wounds and lethargy, she states that the patient has not had any other symptoms. She denies fevers, chills, shortness of breath, chest pain, cough, abdominal pain, N/V/D, changes in urination.    On presentation to the ED, the patient was febrile to 100.8, tachycardic to 104. Hypotensive to 83/53 respirations 20, O2 sat 99% on room air. Her labs were significant for WBC 19.36, BUN/Cr 24/1.70. CXR showed clear lungs, CTAP revealed s/p R femoral ORIF, right hip soft tissue defect with subcutaneous infiltration and edema of the right hip and medial gluteal subcutaneous tissues. Rim-enhancing collection overlying the left greater trochanter with adjacent subcutaneous edema. Differential includes bursitis or infection. Pelvic MRI may be helpful for further evaluation. She received clindamycin, Zosyn, and vancomycin & a 1L bolus of NS and was admitted to medicine for further management. (06 Dec 2021 18:50)       prior hospital charts reviewed [  ]  primary team notes reviewed [ X ]  other consultant notes reviewed [X ]    PAST MEDICAL & SURGICAL HISTORY:  Dementia  AOx1 at baseline    Hypertension    HLD (hyperlipidemia)    CVA (cerebrovascular accident)  2006    S/P ORIF (open reduction internal fixation) fracture        Allergies  No Known Drug Allergies  shellfish (Swelling)    ANTIMICROBIALS (past 90 days)  MEDICATIONS  (STANDING):  clindamycin IVPB   100 mL/Hr IV Intermittent (12-06-21 @ 13:54)    piperacillin/tazobactam IVPB.   200 mL/Hr IV Intermittent (12-06-21 @ 15:32)    piperacillin/tazobactam IVPB..   25 mL/Hr IV Intermittent (12-07-21 @ 10:58)   25 mL/Hr IV Intermittent (12-07-21 @ 00:11)    vancomycin  IVPB   250 mL/Hr IV Intermittent (12-06-21 @ 17:34)        piperacillin/tazobactam IVPB.. 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 Daughter denies alcohol, tobacco, or illicit drug use (06 Dec 2021 18:50)      FAMILY HISTORY:    REVIEW OF SYSTEMS  [ X ] ROS unobtainable because:  dementia  [  ] All other systems negative except as noted below:	    Constitutional:  [ ] fever [ ] chills  [ ] weight loss  [ ] weakness  Skin:  [ ] rash [ ] phlebitis	  Eyes: [ ] icterus [ ] pain  [ ] discharge	  ENMT: [ ] sore throat  [ ] thrush [ ] ulcers [ ] exudates  Respiratory: [ ] dyspnea [ ] hemoptysis [ ] cough [ ] sputum	  Cardiovascular:  [ ] chest pain [ ] palpitations [ ] edema	  Gastrointestinal:  [ ] nausea [ ] vomiting [ ] diarrhea [ ] constipation [ ] pain	  Genitourinary:  [ ] dysuria [ ] frequency [ ] hematuria [ ] discharge [ ] flank pain  [ ] incontinence  Musculoskeletal:  [ ] myalgias [ ] arthralgias [ ] arthritis  [ ] back pain  Neurological:  [ ] headache [ ] seizures  [ ] confusion/altered mental status  Psychiatric:  [ ] anxiety [ ] depression	  Hematology/Lymphatics:  [ ] lymphadenopathy  Endocrine:  [ ] adrenal [ ] thyroid  Allergic/Immunologic:	 [ ] transplant [ ] seasonal    Vital Signs Last 24 Hrs  T(F): 97.3 (12-07-21 @ 12:56), Max: 100.8 (12-06-21 @ 12:35)  Vital Signs Last 24 Hrs  HR: 83 (12-07-21 @ 12:56) (83 - 98)  BP: 120/65 (12-07-21 @ 12:56) (100/60 - 120/65)  RR: 18 (12-07-21 @ 12:56)  SpO2: 98% (12-07-21 @ 12:56) (98% - 100%)  Wt(kg): --    PHYSICAL EXAM:  Constitutional: non-toxic, no distress  HEAD/EYES: anicteric, no conjunctival injection  ENT:  supple, no thrush  Cardiovascular:   normal S1, S2, no murmur, no edema  Respiratory:  clear BS bilaterally, no wheezes, no rales  GI:  soft, non-tender, normal bowel sounds  :  no villanueva, no CVA tenderness  Musculoskeletal:  no synovitis, normal ROM  Neurologic: awake and alert, normal strength, no focal findings  Skin:  no rash, no erythema, no phlebitis  Heme/Onc: no lymphadenopathy   Psychiatric:  A&Ox1                            11.0   13.51 )-----------( 215      ( 07 Dec 2021 08:23 )             32.8   12-07    146<H>  |  109<H>  |  23  ----------------------------<  74  4.3   |  21<L>  |  1.19    Ca    9.2      07 Dec 2021 08:23  Phos  4.4     12-07  Mg     2.40     12-07    TPro  6.6  /  Alb  3.0<L>  /  TBili  0.4  /  DBili  x   /  AST  28  /  ALT  20  /  AlkPhos  82  12-07    Urinalysis Basic - ( 06 Dec 2021 20:11 )    Color: Yellow / Appearance: Clear / SG: >1.050 / pH: x  Gluc: x / Ketone: Negative  / Bili: Negative / Urobili: <2 mg/dL   Blood: x / Protein: Trace / Nitrite: Positive   Leuk Esterase: Large / RBC: 1 /HPF / WBC 34 /HPF   Sq Epi: x / Non Sq Epi: 4 /HPF / Bacteria: Moderate    MICROBIOLOGY:              RADIOLOGY:  imaging below personally reviewed and agree with findings Patient is a 81y old  Female who presents with a chief complaint of Lethargy (07 Dec 2021 12:19)    HPI:  Ms. 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 Per the patient's home health aid, she noticed an abnormality in the patient's R hip for which she visited her primary care physician, who did not recommend further workup. Over the past few days, she has developed worsening sore in the area of the R hip as well as a blister on the L hip and the sacral area. Aside from these worsening wounds and lethargy, she states that the patient has not had any other symptoms. She denies fevers, chills, shortness of breath, chest pain, cough, abdominal pain, N/V/D, changes in urination.    On presentation to the ED, the patient was febrile to 100.8, tachycardic to 104. Hypotensive to 83/53 respirations 20, O2 sat 99% on room air. Her labs were significant for WBC 19.36, BUN/Cr 24/1.70. CXR showed clear lungs, CTAP revealed s/p R femoral ORIF, right hip soft tissue defect with subcutaneous infiltration and edema of the right hip and medial gluteal subcutaneous tissues. Rim-enhancing collection overlying the left greater trochanter with adjacent subcutaneous edema. Differential includes bursitis or infection. Pelvic MRI may be helpful for further evaluation. She received clindamycin, Zosyn, and vancomycin & a 1L bolus of NS and was admitted to medicine for further management. (06 Dec 2021 18:50)       prior hospital charts reviewed [  ]  primary team notes reviewed [ X ]  other consultant notes reviewed [X ]    PAST MEDICAL & SURGICAL HISTORY:  Dementia  AOx1 at baseline    Hypertension    HLD (hyperlipidemia)    CVA (cerebrovascular accident)  2006    S/P ORIF (open reduction internal fixation) fracture        Allergies  No Known Drug Allergies  shellfish (Swelling)    ANTIMICROBIALS (past 90 days)  MEDICATIONS  (STANDING):  clindamycin IVPB   100 mL/Hr IV Intermittent (12-06-21 @ 13:54)    piperacillin/tazobactam IVPB.   200 mL/Hr IV Intermittent (12-06-21 @ 15:32)    piperacillin/tazobactam IVPB..   25 mL/Hr IV Intermittent (12-07-21 @ 10:58)   25 mL/Hr IV Intermittent (12-07-21 @ 00:11)    vancomycin  IVPB   250 mL/Hr IV Intermittent (12-06-21 @ 17:34)        piperacillin/tazobactam IVPB.. 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 Daughter denies alcohol, tobacco, or illicit drug use (06 Dec 2021 18:50)      FAMILY HISTORY:    REVIEW OF SYSTEMS  [ X ] ROS unobtainable because:  dementia  [  ] All other systems negative except as noted below:	    Constitutional:  [ ] fever [ ] chills  [ ] weight loss  [ ] weakness  Skin:  [ ] rash [ ] phlebitis	  Eyes: [ ] icterus [ ] pain  [ ] discharge	  ENMT: [ ] sore throat  [ ] thrush [ ] ulcers [ ] exudates  Respiratory: [ ] dyspnea [ ] hemoptysis [ ] cough [ ] sputum	  Cardiovascular:  [ ] chest pain [ ] palpitations [ ] edema	  Gastrointestinal:  [ ] nausea [ ] vomiting [ ] diarrhea [ ] constipation [ ] pain	  Genitourinary:  [ ] dysuria [ ] frequency [ ] hematuria [ ] discharge [ ] flank pain  [ ] incontinence  Musculoskeletal:  [ ] myalgias [ ] arthralgias [ ] arthritis  [ ] back pain  Neurological:  [ ] headache [ ] seizures  [ ] confusion/altered mental status  Psychiatric:  [ ] anxiety [ ] depression	  Hematology/Lymphatics:  [ ] lymphadenopathy  Endocrine:  [ ] adrenal [ ] thyroid  Allergic/Immunologic:	 [ ] transplant [ ] seasonal    Vital Signs Last 24 Hrs  T(F): 97.3 (12-07-21 @ 12:56), Max: 100.8 (12-06-21 @ 12:35)  Vital Signs Last 24 Hrs  HR: 83 (12-07-21 @ 12:56) (83 - 98)  BP: 120/65 (12-07-21 @ 12:56) (100/60 - 120/65)  RR: 18 (12-07-21 @ 12:56)  SpO2: 98% (12-07-21 @ 12:56) (98% - 100%)  Wt(kg): --    PHYSICAL EXAM:  Constitutional: non-toxic, no distress  HEAD/EYES: anicteric, no conjunctival injection  ENT:  supple, no thrush  Cardiovascular:   normal S1, S2, no murmur, no edema  Respiratory:  clear BS bilaterally, no wheezes, no rales  GI:  soft, suprapubic ttp, bowel sounds present  :  no villanueva, no CVA tenderness  Musculoskeletal:  no synovitis, normal ROM  Neurologic: awake and alert, intermittently follows commands  Skin: Skin breakdown over R. hip with small areas of ulcerations. Increased warmth compared to R. NTTP  Heme/Onc: no lymphadenopathy   Psychiatric:  A&Ox1                            11.0   13.51 )-----------( 215      ( 07 Dec 2021 08:23 )             32.8   12-07    146<H>  |  109<H>  |  23  ----------------------------<  74  4.3   |  21<L>  |  1.19    Ca    9.2      07 Dec 2021 08:23  Phos  4.4     12-07  Mg     2.40     12-07    TPro  6.6  /  Alb  3.0<L>  /  TBili  0.4  /  DBili  x   /  AST  28  /  ALT  20  /  AlkPhos  82  12-07    Urinalysis Basic - ( 06 Dec 2021 20:11 )    Color: Yellow / Appearance: Clear / SG: >1.050 / pH: x  Gluc: x / Ketone: Negative  / Bili: Negative / Urobili: <2 mg/dL   Blood: x / Protein: Trace / Nitrite: Positive   Leuk Esterase: Large / RBC: 1 /HPF / WBC 34 /HPF   Sq Epi: x / Non Sq Epi: 4 /HPF / Bacteria: Moderate    MICROBIOLOGY:    RADIOLOGY:  imaging below personally reviewed and agree with findings    < from: CT Abdomen and Pelvis w/ IV Cont (12.06.21 @ 14:47) >  LIVER: Within normal limits.  BILE DUCTS: Normal caliber.  GALLBLADDER: Cholelithiasis.  SPLEEN: Within normal limits.  PANCREAS: Within normal limits.  ADRENALS: Within normal limits.  KIDNEYS/URETERS: No hydronephrosis. Nonobstructing 4 mm calculus in the left lower pole. Subcentimeter right renal hypodensities too small to characterize.    BLADDER: Within normal limits.  REPRODUCTIVE ORGANS: Calcified fibroid uterus. Central low-attenuation in the uterus, question endometrial thickening.    BOWEL: No bowel obstruction. Appendix is not visualized. Diffuse colonic diverticulosis. Rectum distended with stool.  PERITONEUM: No ascites.  VESSELS: Atherosclerotic changes.  RETROPERITONEUM/LYMPH NODES: No lymphadenopathy.  ABDOMINAL WALL: Right hip soft tissue defect with subcutaneous infiltration and edema of the right hip and medial gluteal subcutaneous tissues. Adjacent streak artifact from right hip prosthesis limits evaluation. Rim-enhancing collection overlying the greater trochanter. Adjacent subcutaneous infiltration.  BONES: Degenerative changes. Grade 1 anterolisthesis of L4 on L5. Status post right femoral ORIF.    IMPRESSION:  Status post right femoral ORIF. Right hip soft tissue defect with subcutaneous infiltration and edema of the right hip and medial gluteal subcutaneous tissues.    Rim-enhancing collection overlying the left greater trochanter with adjacent subcutaneous edema. Differential includes bursitis or infection. Pelvic MRI may be helpful for further evaluation.    Fibroid uterus. Question endometrial thickening. Nonemergent pelvic ultrasound can be performed for further evaluation.    --- End of Report ---      < end of copied text >   Patient is a 81y old  Female who presents with a chief complaint of Lethargy (07 Dec 2021 12:19)    HPI:  Ms. 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 Per the patient's home health aid, she noticed an abnormality in the patient's R hip for which she visited her primary care physician, who did not recommend further workup. Over the past few days, she has developed worsening sore in the area of the R hip as well as a blister on the L hip and the sacral area. Aside from these worsening wounds and lethargy, she states that the patient has not had any other symptoms. She denies fevers, chills, shortness of breath, chest pain, cough, abdominal pain, N/V/D, changes in urination.    On presentation to the ED, the patient was febrile to 100.8, tachycardic to 104. Hypotensive to 83/53 respirations 20, O2 sat 99% on room air. Her labs were significant for WBC 19.36, BUN/Cr 24/1.70. CXR showed clear lungs, CTAP revealed s/p R femoral ORIF, right hip soft tissue defect with subcutaneous infiltration and edema of the right hip and medial gluteal subcutaneous tissues. Rim-enhancing collection overlying the left greater trochanter with adjacent subcutaneous edema. Differential includes bursitis or infection. Pelvic MRI may be helpful for further evaluation. She received clindamycin, Zosyn, and vancomycin & a 1L bolus of NS and was admitted to medicine for further management. (06 Dec 2021 18:50)       prior hospital charts reviewed [  ]  primary team notes reviewed [ X ]  other consultant notes reviewed [X ]    PAST MEDICAL & SURGICAL HISTORY:  Dementia  AOx1 at baseline    Hypertension    HLD (hyperlipidemia)    CVA (cerebrovascular accident)  2006    S/P ORIF (open reduction internal fixation) fracture        Allergies  No Known Drug Allergies  shellfish (Swelling)    ANTIMICROBIALS (past 90 days)  MEDICATIONS  (STANDING):  clindamycin IVPB   100 mL/Hr IV Intermittent (12-06-21 @ 13:54)    piperacillin/tazobactam IVPB.   200 mL/Hr IV Intermittent (12-06-21 @ 15:32)    piperacillin/tazobactam IVPB..   25 mL/Hr IV Intermittent (12-07-21 @ 10:58)   25 mL/Hr IV Intermittent (12-07-21 @ 00:11)    vancomycin  IVPB   250 mL/Hr IV Intermittent (12-06-21 @ 17:34)        piperacillin/tazobactam IVPB.. 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 Daughter denies alcohol, tobacco, or illicit drug use (06 Dec 2021 18:50)      FAMILY HISTORY:    REVIEW OF SYSTEMS  [ X ] ROS unobtainable because:  dementia  [  ] All other systems negative except as noted below:	    Vital Signs Last 24 Hrs  T(F): 97.3 (12-07-21 @ 12:56), Max: 100.8 (12-06-21 @ 12:35)  Vital Signs Last 24 Hrs  HR: 83 (12-07-21 @ 12:56) (83 - 98)  BP: 120/65 (12-07-21 @ 12:56) (100/60 - 120/65)  RR: 18 (12-07-21 @ 12:56)  SpO2: 98% (12-07-21 @ 12:56) (98% - 100%)  Wt(kg): --    PHYSICAL EXAM:  Constitutional: non-toxic, no distress  HEAD/EYES: anicteric, no conjunctival injection  ENT:  supple, no thrush  Cardiovascular:   normal S1, S2, no murmur, no edema  Respiratory:  clear BS bilaterally, no wheezes, no rales  GI:  soft, suprapubic ttp, bowel sounds present  :  no villanueva, no CVA tenderness  Musculoskeletal:  no synovitis, normal ROM  Neurologic: awake and alert, intermittently follows commands  Skin: Skin breakdown over R. hip with small areas of ulcerations. Increased warmth compared to R. NTTP  Heme/Onc: no lymphadenopathy   Psychiatric:  A&Ox1                            11.0   13.51 )-----------( 215      ( 07 Dec 2021 08:23 )             32.8   12-07    146<H>  |  109<H>  |  23  ----------------------------<  74  4.3   |  21<L>  |  1.19    Ca    9.2      07 Dec 2021 08:23  Phos  4.4     12-07  Mg     2.40     12-07    TPro  6.6  /  Alb  3.0<L>  /  TBili  0.4  /  DBili  x   /  AST  28  /  ALT  20  /  AlkPhos  82  12-07    Urinalysis Basic - ( 06 Dec 2021 20:11 )    Color: Yellow / Appearance: Clear / SG: >1.050 / pH: x  Gluc: x / Ketone: Negative  / Bili: Negative / Urobili: <2 mg/dL   Blood: x / Protein: Trace / Nitrite: Positive   Leuk Esterase: Large / RBC: 1 /HPF / WBC 34 /HPF   Sq Epi: x / Non Sq Epi: 4 /HPF / Bacteria: Moderate    MICROBIOLOGY:    RADIOLOGY:  imaging below personally reviewed and agree with findings    < from: CT Abdomen and Pelvis w/ IV Cont (12.06.21 @ 14:47) >  LIVER: Within normal limits.  BILE DUCTS: Normal caliber.  GALLBLADDER: Cholelithiasis.  SPLEEN: Within normal limits.  PANCREAS: Within normal limits.  ADRENALS: Within normal limits.  KIDNEYS/URETERS: No hydronephrosis. Nonobstructing 4 mm calculus in the left lower pole. Subcentimeter right renal hypodensities too small to characterize.    BLADDER: Within normal limits.  REPRODUCTIVE ORGANS: Calcified fibroid uterus. Central low-attenuation in the uterus, question endometrial thickening.    BOWEL: No bowel obstruction. Appendix is not visualized. Diffuse colonic diverticulosis. Rectum distended with stool.  PERITONEUM: No ascites.  VESSELS: Atherosclerotic changes.  RETROPERITONEUM/LYMPH NODES: No lymphadenopathy.  ABDOMINAL WALL: Right hip soft tissue defect with subcutaneous infiltration and edema of the right hip and medial gluteal subcutaneous tissues. Adjacent streak artifact from right hip prosthesis limits evaluation. Rim-enhancing collection overlying the greater trochanter. Adjacent subcutaneous infiltration.  BONES: Degenerative changes. Grade 1 anterolisthesis of L4 on L5. Status post right femoral ORIF.    IMPRESSION:  Status post right femoral ORIF. Right hip soft tissue defect with subcutaneous infiltration and edema of the right hip and medial gluteal subcutaneous tissues.    Rim-enhancing collection overlying the left greater trochanter with adjacent subcutaneous edema. Differential includes bursitis or infection. Pelvic MRI may be helpful for further evaluation.    Fibroid uterus. Question endometrial thickening. Nonemergent pelvic ultrasound can be performed for further evaluation.    --- End of Report ---      < end of copied text >

## 2021-12-07 NOTE — PROGRESS NOTE ADULT - PROBLEM SELECTOR PLAN 4
Patient with history of dementia, AOx1 at baseline  - Holding home memantine pending swallow eval Patient with history of dementia, AOx1 at baseline  - Continue home memantine Patient with history of dementia, AOx1 at baseline  - Continue home donepezil, fluphenazine

## 2021-12-07 NOTE — CONSULT NOTE ADULT - ASSESSMENT
Patient is 80 yo F with hx of HTN, Alzheimer's dementia (AOx1 at baseline) who presents with lethargy over the past week found to be septic with possible urine vs skin and soft tissue source.    //Sepsis  With elevated lactate and TERENCE on admission.  -UA grossly positive  -R. hip edema with rim enhancement on CT scan  -Empirically on vanc by level and zosyn  -F/U blood cx x2  -F/u Urine cx  -MRSA swab in lab, if negative would dc vanco  -Can continue with zosyn, adjust with improving creatinine    *Final recommendations pending d/w attending, Dr. Dunbar   Patient is 82 yo F with hx of HTN, Alzheimer's dementia (AOx1 at baseline) who presents with lethargy over the past week found to be septic with possible urine vs skin and soft tissue source.    //Sepsis  With elevated lactate and TERENCE on admission.  -UA grossly positive, difficult for patient to endorse symptoms, did report suprapubic tenderness on exam today  -R. hip subcutaneous edema with rim enhancement near greater trochanter suspcious for abscess vs bursitis  -Empirically on vanc by level and zosyn  -F/U blood cx x2  -F/u Urine cx  -MRSA swab in lab, if negative would dc vanco  -C/w with zosyn, adjust with improving creatinine  -Would obtain MRI if hardware is compatible    //Constipation  -Moderate to large stool burden on CT abdomen  -Would treat underlying constipation as could be precipitant to UTI    *Final recommendations pending d/w attending, Dr. Dunbar   Patient is 82 yo F with hx of HTN, Alzheimer's dementia (AOx1 at baseline) who presents with lethargy over the past week found to be septic with possible urine vs skin and soft tissue source.    //Sepsis  With elevated lactate and TERENCE on admission. S/p clinda x1, vanc x1 and zosyn in ED.    -UA grossly positive, unreliable historian in setting of advanced dementia, did report suprapubic tenderness on exam today  -R. hip subcutaneous edema with rim enhancement near greater trochanter suspicious for abscess vs bursitis  -Empirically on vanc by level and zosyn  -F/U blood cx x2  -F/u Urine cx  -MRSA swab in lab, if negative would dc vanco  -C/w with zosyn, adjust with improving creatinine  -Would obtain MRI if hardware is compatible    //Constipation  -Moderate to large stool burden on CT abdomen  -Would treat underlying constipation as could be precipitant to UTI    *Final recommendations pending d/w attending, Dr. Dunbar   Patient is 80 yo F with hx of HTN, Alzheimer's dementia (AOx1 at baseline) who presents with lethargy over the past week found to be septic with possible urine vs skin and soft tissue source.    //Sepsis  With elevated lactate and TERENCE on admission. S/p clinda x1, vanc x1 and zosyn in ED.    -UA grossly positive, unreliable historian in setting of advanced dementia, did report suprapubic tenderness on exam today  -R. hip subcutaneous edema with rim enhancement near greater trochanter suspicious for abscess vs bursitis  -Empirically on vanc by level and zosyn  -F/U blood cx x2  -F/u Urine cx  -MRSA swab in lab, if negative would dc vanco  -C/w with zosyn, adjust with improving creatinine  -Would obtain MRI of R. hip if hardware is compatible  -Could obtain nonvascular US of hip for further characterization of rip enhancing collection    //Constipation  -Moderate to large stool burden on CT abdomen  -Would treat underlying constipation as could be precipitant to UTI    *Final recommendations pending d/w attending, Dr. Dunbar   Patient is 82 yo F with hx of HTN, Alzheimer's dementia (AOx1 at baseline) who presents with lethargy over the past week found to be septic with possible urine vs skin and soft tissue source.    //Sepsis  With elevated lactate and TERENCE on admission. S/p clinda x1, vanc x1 and zosyn in ED.  vs skin/soft tissue/ joint source.    -UA grossly positive, unreliable historian in setting of advanced dementia, did report suprapubic tenderness on exam today  -R. hip subcutaneous edema with rim enhancement near greater trochanter suspicious for abscess vs bursitis  -Empirically on vanc by level and zosyn  -F/U blood cx x2  -F/u Urine cx  -MRSA swab in lab, if negative would dc vanco  -C/w with zosyn and vanco, adjust with improving creatinine  -Pending plain films of hip per orthopedics      //Constipation  -Moderate to large stool burden on CT abdomen  -Would treat underlying constipation as could be precipitant to UTI    *Seen and discussed with attending, Dr. Dunbar

## 2021-12-07 NOTE — PROGRESS NOTE ADULT - PROBLEM SELECTOR PLAN 5
[facility] [address]  [address] [address]  Dept Phone: [phone]                                                                                                                                                        Martin Esteves  [address] [place] [address] [place] [address]    YOB: 1955 March 23, 2021      Adult Cardiac Pre-Operative Assessment     To Whom It May Concern:    Mr.Herbert RUSS Esteves is under my care for management of his cardiovascular condition.      Cardiovascular Risk Assessment for Surgery:  He is a reasonable candidate for colonoscopy/EGD with Dr. Quirino Bautista with an INTERMEDIATE risk from a cardiovascular standpoint at this time. Reevaluation needed, if he should present with symptoms prior to surgery/procedure.      Pre-Operative Recommendations:   Stop Eliquis 2 days pre-operatively.       Sincerely,    Electronically signed by Jasmin Hess MD       Patient with remote history of CVA  - Holding home simvastatin pending swallow eval Patient with remote history of CVA  - Continue home simvastatin

## 2021-12-07 NOTE — SWALLOW BEDSIDE ASSESSMENT ADULT - COMMENTS
As per internal medicine progress note "Ms. Dunaway is an 82 yo F with a history of HTN, CVA w/o focal residual deficits, Alzheimer's dementia (AOx1 at baseline) who presents with lethargy over the past week likely i/s/o sepsis 2/2 hip infection vs. UTI"    CXR 12/6/21 revealed clear lungs. White blood Cell Count 12.51     Patient received upright in bed, repositioned by RN. Patient pleasant and cooperative throughout assessment however was unable to follow 1 step directions. She denied pain pre and post assessment.

## 2021-12-07 NOTE — PROGRESS NOTE ADULT - ATTENDING COMMENTS
81F [employer] (per HHA - will have to confirm), with PMH of HTN, HLD, Alzheimer's dementia, stage 1 sacral ulcers who presents from home w/ increased fatigue and new onset of sores/blisters on her back. Imaging showed right hip soft tissue defect w/ edema adjacent to hip hardware. Admitted for further infectious workup.     Pt seen and evaluated at bedside. Aide present as well. She states pt is "better." On exam, pt is clearly much more awake/alert. She remains confused, she is at her baseline orientation 2' demented status. Otherwise, appeared comfortable. No new fever. BP stable. Cultures remain in progress.    -Continue IV abx - vanc/zosyn.  -Ortho/ID recs appreciated. Will cont to follow.  -Endometrial thickening is noted - however, consider age/cormobidities - further investigation is unlikely to alter overall prognosis. Will further discuss w/ family.    Rest of plan as above.

## 2021-12-07 NOTE — PROGRESS NOTE ADULT - ASSESSMENT
Ms. Dunaway is an 82 yo F with a history of HTN, CVA w/o focal residual deficits, Alzheimer's dementia (AOx1 at baseline) who presents with lethargy over the past week likely i/s/o sepsis 2/2 hip infection vs. UTI.

## 2021-12-08 LAB
ANION GAP SERPL CALC-SCNC: 13 MMOL/L — SIGNIFICANT CHANGE UP (ref 7–14)
BLD GP AB SCN SERPL QL: NEGATIVE — SIGNIFICANT CHANGE UP
BUN SERPL-MCNC: 10 MG/DL — SIGNIFICANT CHANGE UP (ref 7–23)
CALCIUM SERPL-MCNC: 9.2 MG/DL — SIGNIFICANT CHANGE UP (ref 8.4–10.5)
CHLORIDE SERPL-SCNC: 106 MMOL/L — SIGNIFICANT CHANGE UP (ref 98–107)
CO2 SERPL-SCNC: 23 MMOL/L — SIGNIFICANT CHANGE UP (ref 22–31)
CREAT SERPL-MCNC: 0.77 MG/DL — SIGNIFICANT CHANGE UP (ref 0.5–1.3)
CULTURE RESULTS: SIGNIFICANT CHANGE UP
GLUCOSE SERPL-MCNC: 83 MG/DL — SIGNIFICANT CHANGE UP (ref 70–99)
HCT VFR BLD CALC: 34.5 % — SIGNIFICANT CHANGE UP (ref 34.5–45)
HGB BLD-MCNC: 11.3 G/DL — LOW (ref 11.5–15.5)
LACTATE SERPL-SCNC: 1.8 MMOL/L — SIGNIFICANT CHANGE UP (ref 0.5–2)
MAGNESIUM SERPL-MCNC: 2.2 MG/DL — SIGNIFICANT CHANGE UP (ref 1.6–2.6)
MCHC RBC-ENTMCNC: 28.6 PG — SIGNIFICANT CHANGE UP (ref 27–34)
MCHC RBC-ENTMCNC: 32.8 GM/DL — SIGNIFICANT CHANGE UP (ref 32–36)
MCV RBC AUTO: 87.3 FL — SIGNIFICANT CHANGE UP (ref 80–100)
NRBC # BLD: 0 /100 WBCS — SIGNIFICANT CHANGE UP
NRBC # FLD: 0 K/UL — SIGNIFICANT CHANGE UP
ORGANISM # SPEC MICROSCOPIC CNT: SIGNIFICANT CHANGE UP
ORGANISM # SPEC MICROSCOPIC CNT: SIGNIFICANT CHANGE UP
PHOSPHATE SERPL-MCNC: 3.8 MG/DL — SIGNIFICANT CHANGE UP (ref 2.5–4.5)
PLATELET # BLD AUTO: 223 K/UL — SIGNIFICANT CHANGE UP (ref 150–400)
POTASSIUM SERPL-MCNC: 3.9 MMOL/L — SIGNIFICANT CHANGE UP (ref 3.5–5.3)
POTASSIUM SERPL-SCNC: 3.9 MMOL/L — SIGNIFICANT CHANGE UP (ref 3.5–5.3)
RBC # BLD: 3.95 M/UL — SIGNIFICANT CHANGE UP (ref 3.8–5.2)
RBC # FLD: 14.7 % — HIGH (ref 10.3–14.5)
RH IG SCN BLD-IMP: POSITIVE — SIGNIFICANT CHANGE UP
SODIUM SERPL-SCNC: 142 MMOL/L — SIGNIFICANT CHANGE UP (ref 135–145)
SPECIMEN SOURCE: SIGNIFICANT CHANGE UP
VANCOMYCIN FLD-MCNC: 14.8 UG/ML — SIGNIFICANT CHANGE UP
WBC # BLD: 9.62 K/UL — SIGNIFICANT CHANGE UP (ref 3.8–10.5)
WBC # FLD AUTO: 9.62 K/UL — SIGNIFICANT CHANGE UP (ref 3.8–10.5)

## 2021-12-08 PROCEDURE — 99232 SBSQ HOSP IP/OBS MODERATE 35: CPT | Mod: GC

## 2021-12-08 PROCEDURE — 73552 X-RAY EXAM OF FEMUR 2/>: CPT | Mod: 26,RT

## 2021-12-08 PROCEDURE — 99221 1ST HOSP IP/OBS SF/LOW 40: CPT

## 2021-12-08 PROCEDURE — 72170 X-RAY EXAM OF PELVIS: CPT | Mod: 26

## 2021-12-08 RX ADMIN — PIPERACILLIN AND TAZOBACTAM 25 GRAM(S): 4; .5 INJECTION, POWDER, LYOPHILIZED, FOR SOLUTION INTRAVENOUS at 13:59

## 2021-12-08 RX ADMIN — FLUPHENAZINE HYDROCHLORIDE 1 MILLIGRAM(S): 1 TABLET, FILM COATED ORAL at 12:00

## 2021-12-08 RX ADMIN — HEPARIN SODIUM 5000 UNIT(S): 5000 INJECTION INTRAVENOUS; SUBCUTANEOUS at 09:18

## 2021-12-08 RX ADMIN — DONEPEZIL HYDROCHLORIDE 10 MILLIGRAM(S): 10 TABLET, FILM COATED ORAL at 21:49

## 2021-12-08 RX ADMIN — PIPERACILLIN AND TAZOBACTAM 25 GRAM(S): 4; .5 INJECTION, POWDER, LYOPHILIZED, FOR SOLUTION INTRAVENOUS at 21:53

## 2021-12-08 RX ADMIN — PIPERACILLIN AND TAZOBACTAM 25 GRAM(S): 4; .5 INJECTION, POWDER, LYOPHILIZED, FOR SOLUTION INTRAVENOUS at 05:37

## 2021-12-08 RX ADMIN — HEPARIN SODIUM 5000 UNIT(S): 5000 INJECTION INTRAVENOUS; SUBCUTANEOUS at 16:29

## 2021-12-08 RX ADMIN — SIMVASTATIN 40 MILLIGRAM(S): 20 TABLET, FILM COATED ORAL at 21:49

## 2021-12-08 NOTE — PROGRESS NOTE ADULT - PROBLEM SELECTOR PLAN 2
Patient with UA significant for nitrite, LE, WBC, bacteria  - F/u urine culture  - Continue empiric Zosyn renal dosing (12/6-) Patient with UA significant for nitrite, LE, WBC, bacteria  - F/u urine culture  - Continue Zosyn (12/6-) Patient with UA significant for nitrite, LE, WBC, bacteria  Urine culture with normal urogenital guera  Likely not true urinary tract infection  - Continue Zosyn (12/6-) per above

## 2021-12-08 NOTE — DIETITIAN INITIAL EVALUATION ADULT. - ORAL INTAKE PTA/DIET HISTORY
Health aide reports patient with very good PO intake PTA. Diet recall consists of fruits, vegetables, fish, whole grains, regular texture food and thin liquids. Reports patient takes a "liquid vitamin" but unable to specify type. Confirms allergy to shellfish (swelling)

## 2021-12-08 NOTE — DIETITIAN INITIAL EVALUATION ADULT. - OTHER INFO
Health aide reports patient with very good PO intake in house. Denies patient with signs of GI distress (nausea/vomiting/diarrhea/constipation). Passed swallow evaluation on 12/7, recommended for soft and bite sized with mildly thick liquids. Health aide reports patient's usual body weight is 54.5kg, consistent with dosing weight of 55kg.

## 2021-12-08 NOTE — PROGRESS NOTE ADULT - PROBLEM SELECTOR PLAN 3
Unclear baseline, today with Cr 1.70  Downtrended today to 1.19 s/p LR  FeNa 0.2%, pre-renal cause  - Continue IVF today  - Trend BMP  - Strict Is/Os Resolved  With Cr to 1.70 on admission  Downtrended to 0.77 s/p LR  - S/p IVF  - Trend BMP  - Strict Is/Os

## 2021-12-08 NOTE — DIETITIAN INITIAL EVALUATION ADULT. - REASON INDICATOR FOR ASSESSMENT
Pressure injury stage II or greater; assessment and education  Source: health aide at bedside, EMR. Patient A&Ox1 at baseline per chart

## 2021-12-08 NOTE — PROGRESS NOTE ADULT - ASSESSMENT
Patient is 80 yo F with hx of HTN, Alzheimer's dementia (AOx1 at baseline) who presents with lethargy over the past week found to be septic with possible urine vs skin and soft tissue source.    //Sepsis  With elevated lactate and TERENCE on admission. S/p clinda x1, vanc x1 and zosyn in ED.  vs skin/soft tissue/ joint source.    -UA grossly positive, unreliable historian in setting of advanced dementia, did report suprapubic tenderness on exam today  -R. hip subcutaneous edema with rim enhancement near greater trochanter suspicious for abscess vs bursitis  -Empirically on vanc by level and zosyn  -1/4 blood cx with CoNS, probable contaminant  -Urine cx with <10k CFU normal  guera  -MRSA negative, DC vanco  -C/w with zosyn, q8h  -Pending plain films of hip per orthopedics  -Wound care eval, low concern for SSTI      //Constipation  -Moderate to large stool burden on CT abdomen  -Would treat underlying constipation as could be precipitant to UTI    *Pending d/w attending, Dr. Dunbar   Patient is 82 yo F with hx of HTN, Alzheimer's dementia (AOx1 at baseline) who presents with lethargy over the past week found to be septic with possible urine vs skin and soft tissue source.    //Sepsis  With elevated lactate and TERENCE on admission. S/p clinda x1, vanc x1 and zosyn in ED.  vs skin/soft tissue/ joint source.    -UA grossly positive, unreliable historian in setting of advanced dementia, did report suprapubic tenderness on exam today  -R. hip subcutaneous edema with rim enhancement near greater trochanter suspicious for abscess vs bursitis  -s/p empiric vanco  -1/4 blood cx with CoNS, probable contaminant  -Urine cx with <10k CFU normal  guera  -MRSA negative, DC vanco  -C/w with zosyn, q8h  -Pending plain films of hip per orthopedics  -Wound care eval, low concern for SSTI      //Constipation  -Moderate to large stool burden on CT abdomen  -Would treat underlying constipation as could be precipitant to UTI    *d/w attending, Dr. Dunbar

## 2021-12-08 NOTE — PROGRESS NOTE ADULT - SUBJECTIVE AND OBJECTIVE BOX
PROGRESS NOTE:   Authored by Brody Goncalves MD  Pager: [facility] [phone]; [facility] [phone]    Patient is a 81y old  Female who presents with a chief complaint of Lethargy (07 Dec 2021 17:17)      SUBJECTIVE / OVERNIGHT EVENTS:    ADDITIONAL REVIEW OF SYSTEMS:    MEDICATIONS  (STANDING):  donepezil 10 milliGRAM(s) Oral at bedtime  fluPHENAZine 1 milliGRAM(s) Oral daily  heparin   Injectable 5000 Unit(s) SubCutaneous every 8 hours  influenza  Vaccine (HIGH DOSE) 0.7 milliLiter(s) IntraMuscular once  lactated ringers. 1000 milliLiter(s) (75 mL/Hr) IV Continuous <Continuous>  piperacillin/tazobactam IVPB.. 3.375 Gram(s) IV Intermittent every 8 hours  senna 2 Tablet(s) Oral at bedtime  simvastatin 40 milliGRAM(s) Oral at bedtime    MEDICATIONS  (PRN):      CAPILLARY BLOOD GLUCOSE        I&O's Summary    07 Dec 2021 07:01  -  08 Dec 2021 07:00  --------------------------------------------------------  IN: 834 mL / OUT: 2500 mL / NET: -1666 mL        PHYSICAL EXAM:  Vital Signs Last 24 Hrs  T(C): 36.8 (08 Dec 2021 05:33), Max: 36.8 (07 Dec 2021 21:51)  T(F): 98.2 (08 Dec 2021 05:33), Max: 98.2 (07 Dec 2021 21:51)  HR: 63 (08 Dec 2021 05:33) (63 - 83)  BP: 126/81 (08 Dec 2021 05:33) (120/65 - 126/81)  BP(mean): --  RR: 18 (08 Dec 2021 05:33) (18 - 18)  SpO2: 100% (08 Dec 2021 05:33) (94% - 100%)    GENERAL: No acute distress, well-developed  HEAD:  Atraumatic, Normocephalic  EYES: EOMI, PERRLA, conjunctiva and sclera clear  NECK: Supple, no lymphadenopathy, no JVD  CHEST/LUNG: CTAB; No wheezes, rales, or rhonchi  HEART: Regular rate and rhythm; No murmurs, rubs, or gallops  ABDOMEN: Soft, non-tender, non-distended; normal bowel sounds, no organomegaly  EXTREMITIES:  2+ peripheral pulses b/l, No clubbing, cyanosis, or edema  NEUROLOGY: A&O x 3, no focal deficits  SKIN: No rashes or lesions    LABS:                        11.3   9.62  )-----------( 223      ( 08 Dec 2021 07:38 )             34.5     12-07    146<H>  |  109<H>  |  23  ----------------------------<  74  4.3   |  21<L>  |  1.19    Ca    9.2      07 Dec 2021 08:23  Phos  4.4     12-07  Mg     2.40     12-07    TPro  6.6  /  Alb  3.0<L>  /  TBili  0.4  /  DBili  x   /  AST  28  /  ALT  20  /  AlkPhos  82  12-07    PT/INR - ( 06 Dec 2021 12:59 )   PT: 12.8 sec;   INR: 1.12 ratio         PTT - ( 06 Dec 2021 12:59 )  PTT:25.9 sec      Urinalysis Basic - ( 06 Dec 2021 20:11 )    Color: Yellow / Appearance: Clear / SG: >1.050 / pH: x  Gluc: x / Ketone: Negative  / Bili: Negative / Urobili: <2 mg/dL   Blood: x / Protein: Trace / Nitrite: Positive   Leuk Esterase: Large / RBC: 1 /HPF / WBC 34 /HPF   Sq Epi: x / Non Sq Epi: 4 /HPF / Bacteria: Moderate        Culture - Urine (collected 06 Dec 2021 20:31)  Source: Clean Catch Clean Catch (Midstream)  Final Report (07 Dec 2021 22:19):    <10,000 CFU/mL Normal Urogenital Macey    Culture - Blood (collected 06 Dec 2021 14:39)  Source: .Blood Blood-Peripheral  Gram Stain (07 Dec 2021 20:28):    Growth in anaerobic bottle: Gram Positive Cocci in Clusters  Preliminary Report (07 Dec 2021 20:29):    Growth in anaerobic bottle: Gram Positive Cocci in Clusters    ***Blood Panel PCR results on this specimen are available    approximately 3 hours after the Gram stain result.***    Gram stain, PCR, and/or culture results may not always    correspond due todifference in methodologies.    ************************************************************    This PCR assay was performed by multiplex PCR. This    Assay tests for 66 bacterial and resistance gene targets.    Please refer to the [facility] Labs testdirectory    at [email or web address] for details.  Organism: Blood Culture PCR (07 Dec 2021 23:24)  Organism: Blood Culture PCR (07 Dec 2021 23:24)    Culture - Blood (collected 06 Dec 2021 14:39)  Source: .Blood Blood-Peripheral  Preliminary Report (07 Dec 2021 15:02):    No growth to date.        RADIOLOGY & ADDITIONAL TESTS:  Results Reviewed:   Imaging Personally Reviewed:  Electrocardiogram Personally Reviewed:    COORDINATION OF CARE:  Care Discussed with Consultants/Other Providers [Y/N]:  Prior or Outpatient Records Reviewed [Y/N]:   PROGRESS NOTE:   Authored by Brody Goncalves MD  Pager: [facility] [phone]; [facility] [phone]    Patient is a 81y old  Female who presents with a chief complaint of Lethargy (07 Dec 2021 17:17)      SUBJECTIVE / OVERNIGHT EVENTS:  No acute events overnight. This morning the patient reports feeling okay.  Of note, it is difficult to assess how she is feeling due to her underlying cognitive impairment.    ADDITIONAL REVIEW OF SYSTEMS:  Unable to assess due to patient's mental status    MEDICATIONS  (STANDING):  donepezil 10 milliGRAM(s) Oral at bedtime  fluPHENAZine 1 milliGRAM(s) Oral daily  heparin   Injectable 5000 Unit(s) SubCutaneous every 8 hours  influenza  Vaccine (HIGH DOSE) 0.7 milliLiter(s) IntraMuscular once  lactated ringers. 1000 milliLiter(s) (75 mL/Hr) IV Continuous <Continuous>  piperacillin/tazobactam IVPB.. 3.375 Gram(s) IV Intermittent every 8 hours  senna 2 Tablet(s) Oral at bedtime  simvastatin 40 milliGRAM(s) Oral at bedtime    MEDICATIONS  (PRN):      CAPILLARY BLOOD GLUCOSE        I&O's Summary    07 Dec 2021 07:01  -  08 Dec 2021 07:00  --------------------------------------------------------  IN: 834 mL / OUT: 2500 mL / NET: -1666 mL        PHYSICAL EXAM:  Vital Signs Last 24 Hrs  T(C): 36.8 (08 Dec 2021 05:33), Max: 36.8 (07 Dec 2021 21:51)  T(F): 98.2 (08 Dec 2021 05:33), Max: 98.2 (07 Dec 2021 21:51)  HR: 63 (08 Dec 2021 05:33) (63 - 83)  BP: 126/81 (08 Dec 2021 05:33) (120/65 - 126/81)  BP(mean): --  RR: 18 (08 Dec 2021 05:33) (18 - 18)  SpO2: 100% (08 Dec 2021 05:33) (94% - 100%)    GENERAL: No acute distress, elderly appearing  NECK: Supple, no JVD  CHEST/LUNG: CTAB; No wheezes, rales, or rhonchi  HEART: Regular rate and rhythm; Soft systolic murmur appreciated  ABDOMEN: Soft, non-tender, non-distended; normal bowel sounds  EXTREMITIES:  2+ peripheral pulses b/l, No clubbing, cyanosis, or edema  NEUROLOGY: A&O x 3, no focal deficits  SKIN: Small un-stageable ulcer noted on R trochanter open to air    LABS:                        11.3   9.62  )-----------( 223      ( 08 Dec 2021 07:38 )             34.5     12-07    146<H>  |  109<H>  |  23  ----------------------------<  74  4.3   |  21<L>  |  1.19    Ca    9.2      07 Dec 2021 08:23  Phos  4.4     12-07  Mg     2.40     12-07    TPro  6.6  /  Alb  3.0<L>  /  TBili  0.4  /  DBili  x   /  AST  28  /  ALT  20  /  AlkPhos  82  12-07    PT/INR - ( 06 Dec 2021 12:59 )   PT: 12.8 sec;   INR: 1.12 ratio         PTT - ( 06 Dec 2021 12:59 )  PTT:25.9 sec      Urinalysis Basic - ( 06 Dec 2021 20:11 )    Color: Yellow / Appearance: Clear / SG: >1.050 / pH: x  Gluc: x / Ketone: Negative  / Bili: Negative / Urobili: <2 mg/dL   Blood: x / Protein: Trace / Nitrite: Positive   Leuk Esterase: Large / RBC: 1 /HPF / WBC 34 /HPF   Sq Epi: x / Non Sq Epi: 4 /HPF / Bacteria: Moderate        Culture - Urine (collected 06 Dec 2021 20:31)  Source: Clean Catch Clean Catch (Midstream)  Final Report (07 Dec 2021 22:19):    <10,000 CFU/mL Normal Urogenital Macey    Culture - Blood (collected 06 Dec 2021 14:39)  Source: .Blood Blood-Peripheral  Gram Stain (07 Dec 2021 20:28):    Growth in anaerobic bottle: Gram Positive Cocci in Clusters  Preliminary Report (07 Dec 2021 20:29):    Growth in anaerobic bottle: Gram Positive Cocci in Clusters    ***Blood Panel PCR results on this specimen are available    approximately 3 hours after the Gram stain result.***    Gram stain, PCR, and/or culture results may not always    correspond due todifference in methodologies.    ************************************************************    This PCR assay was performed by multiplex PCR. This    Assay tests for 66 bacterial and resistance gene targets.    Please refer to the [facility] Labs testdirectory    at [email or web address] for details.  Organism: Blood Culture PCR (07 Dec 2021 23:24)  Organism: Blood Culture PCR (07 Dec 2021 23:24)    Culture - Blood (collected 06 Dec 2021 14:39)  Source: .Blood Blood-Peripheral  Preliminary Report (07 Dec 2021 15:02):    No growth to date.

## 2021-12-08 NOTE — PROGRESS NOTE ADULT - PROBLEM SELECTOR PLAN 8
DVT ppx: SQH  Diet: Soft & bite sized, mildly thick liquids  Dispo: pending medical management    FULL CODE  HCP: Daughter Alma Rosa [phone] DVT ppx: SQH  Diet: Soft & bite sized, mildly thick liquids  Dispo: pending PT eval    FULL CODE  HCP: Daughter Alma Rosa [phone]

## 2021-12-08 NOTE — DIETITIAN INITIAL EVALUATION ADULT. - PERTINENT MEDS FT
MEDICATIONS  (STANDING):  donepezil 10 milliGRAM(s) Oral at bedtime  fluPHENAZine 1 milliGRAM(s) Oral daily  heparin   Injectable 5000 Unit(s) SubCutaneous every 8 hours  influenza  Vaccine (HIGH DOSE) 0.7 milliLiter(s) IntraMuscular once  lactated ringers. 1000 milliLiter(s) (75 mL/Hr) IV Continuous <Continuous>  piperacillin/tazobactam IVPB.. 3.375 Gram(s) IV Intermittent every 8 hours  senna 2 Tablet(s) Oral at bedtime  simvastatin 40 milliGRAM(s) Oral at bedtime

## 2021-12-08 NOTE — PHYSICAL THERAPY INITIAL EVALUATION ADULT - PERTINENT HX OF CURRENT PROBLEM, REHAB EVAL
Pt an 80 yo F with a history of HTN, CVA w/o focal residual deficits, Alzheimer's dementia (AOx1 at baseline) who presents with lethargy over the past week likely i/s/o sepsis 2/2 hip infection.

## 2021-12-08 NOTE — PHYSICAL THERAPY INITIAL EVALUATION ADULT - PATIENT PROFILE REVIEW, REHAB EVAL
No Formal Activity Order in the computer; spoke with RN Prabha Emerson prior to PT evaluation--> Pt OK for PT consult/yes

## 2021-12-08 NOTE — DIETITIAN INITIAL EVALUATION ADULT. - CHIEF COMPLAINT
The patient is an 80 yo F with a history of HTN, CVA w/o focal residual deficits, Alzheimer's dementia (AOx1 at baseline) who presents with lethargy over the past week likely i/s/o sepsis 2/2 hip infection vs. UTI.

## 2021-12-08 NOTE — PROGRESS NOTE ADULT - ATTENDING COMMENTS
82 yo woman with dementia h/o CVA s/p ORIF right hip who was brought to hospital with fever, decreased ambulation due to right hip pain; found to have leucocytosis and CT evidence of right hip soft tissue defect and edema right hip soft tissue, rim enhancing collection overlying ?right great trochanter.    Much more alert and interactive today.  Awaiting x ray of right hip       Will follow.  Rachell Dunbar MD  Pager: [phone]  After 5 PM or weekends please call fellow on call or office [phone]

## 2021-12-08 NOTE — PROGRESS NOTE ADULT - PROBLEM SELECTOR PLAN 1
Patient admitted with lethargy, found to meet SIRS criteria by WBC, HR, temperature on admission  With new skin lesions on b/l hips and back, no other localizing symptoms  CRP elevated 124.2, lactate 2.4  12/6 CTAP revealed right hip soft tissue defect with subcutaneous infiltration and edema of the right hip and medial gluteal subcutaneous tissues. Rim-enhancing collection overlying the left greater trochanter with adjacent subcutaneous edema. Differential includes bursitis or infection.   UA with nitrite, LE, WBC, bacteria  Likely i/s/o soft tissue infection vs. prosthetic joint infection vs. UTI  S/p LR 75cc/hr x12 hours, lactate downtrended  - F/u blood cultures  - F/u urine culture  - LR 75cc/hr x 12 hr  - Vancomycin per CrCl (12/6-)  - Zosyn renal dosing (12/6-)  - MRSA swab, f/u results  - Wound care MD team consulted, surgery consulted by ED  - Ortho consulted, appreciate recs  - ID consulted, appreciate recs  - Trend WBC, fever curve  - Tylenol prn fever Patient admitted with lethargy, found to meet SIRS criteria by WBC, HR, temperature on admission  With new skin lesions on b/l hips and back, no other localizing symptoms  CRP elevated 124.2, lactate 2.4  12/6 CTAP revealed right hip soft tissue defect with subcutaneous infiltration and edema of the right hip and medial gluteal subcutaneous tissues. Rim-enhancing collection overlying the left greater trochanter with adjacent subcutaneous edema. Differential includes bursitis or infection.   UA with nitrite, LE, WBC, bacteria  Likely i/s/o soft tissue infection vs. prosthetic joint infection vs. UTI  S/p LR 75cc/hr x12 hours, lactate downtrended  - F/u blood cultures  - F/u urine culture  - LR 75cc/hr x 12 hr  - Vancomycin per CrCl (12/6-)  - Zosyn (12/6-)  - MRSA swab, f/u results  - Wound care MD team consulted, surgery consulted by ED  - Ortho consulted, appreciate recs  - ID consulted, appreciate recs  - Trend WBC, fever curve  - Tylenol prn fever Patient admitted with lethargy, found to meet SIRS criteria by WBC, HR, temperature on admission  With new skin lesions on b/l hips and back, no other localizing symptoms  CRP elevated 124.2, lactate 2.4  12/6 CTAP revealed right hip soft tissue defect with subcutaneous infiltration and edema of the right hip and medial gluteal subcutaneous tissues. Rim-enhancing collection overlying the left greater trochanter with adjacent subcutaneous edema. Differential includes bursitis or infection.   UA with nitrite, LE, WBC, bacteria  Likely i/s/o soft tissue infection vs. prosthetic joint infection vs. UTI  S/p LR 75cc/hr x12 hours, lactate downtrended  Blood cultures with coag neg staph in 1/4 bottles, likely contaminate  Urine cultures with normal urogenital guera  MRSA swab negative  - D/c vancomycin  - Zosyn (12/6-)  - XR Femur 2 views/pelvis to evaluate hardware  - Wound care MD team consulted, appreciate recs  - Ortho consulted, appreciate recs  - ID consulted, appreciate recs  - Trend WBC, fever curve  - Tylenol prn fever

## 2021-12-08 NOTE — PROGRESS NOTE ADULT - SUBJECTIVE AND OBJECTIVE BOX
81y Female    Reason for consult:    Overnight/Subjective:  -No acute events overnight  -Afebrile  -1/4 blood cx with CoNS  -Urine cx with <10k CFU normal genital guera    Objective:    Meds:  piperacillin/tazobactam IVPB.. 3.375 Gram(s) IV Intermittent every 8 hours    Allergies    No Known Drug Allergies  shellfish (Swelling)    Intolerances        VITALS:  VITALS:   T(C): 36.8 (12-08-21 @ 05:33), Max: 36.8 (12-07-21 @ 21:51)  HR: 63 (12-08-21 @ 05:33) (63 - 83)  BP: 126/81 (12-08-21 @ 05:33) (120/65 - 126/81)  RR: 18 (12-08-21 @ 05:33) (18 - 18)  SpO2: 100% (12-08-21 @ 05:33) (94% - 100%)    GENERAL: NAD, lying in bed comfortably  HEAD:  Atraumatic, Normocephalic  EYES: EOMI, PERRLA, conjunctiva and sclera clear  ENT: Moist mucous membranes  NECK: Supple, No JVD  CHEST/LUNG: Clear to auscultation bilaterally; No rales, rhonchi, wheezing, or rubs. Unlabored respirations  HEART: Regular rate and rhythm; No murmurs, rubs, or gallops  ABDOMEN: BSx4; Soft, nontender, nondistended  EXTREMITIES:  1+ Peripheral Pulses, brisk capillary refill. No clubbing, cyanosis, or edema  NERVOUS SYSTEM:  A&Ox1, no focal deficits   SKIN: No rashes or lesions      LABS/DIAGNOSTIC TESTS:                          11.3   9.62  )-----------( 223      ( 08 Dec 2021 07:38 )             34.5     Lactate, Blood: 1.8 mmol/L (12-08 @ 07:38)      12-08    142  |  106  |  10  ----------------------------<  83  3.9   |  23  |  0.77    Ca    9.2      08 Dec 2021 07:38  Phos  3.8     12-08  Mg     2.20     12-08    TPro  6.6  /  Alb  3.0<L>  /  TBili  0.4  /  DBili  x   /  AST  28  /  ALT  20  /  AlkPhos  82  12-07      LIVER FUNCTIONS - ( 07 Dec 2021 08:23 )  Alb: 3.0 g/dL / Pro: 6.6 g/dL / ALK PHOS: 82 U/L / ALT: 20 U/L / AST: 28 U/L / GGT: x             CULTURES: Clean Catch Clean Catch (Midstream)  12-06 @ 20:31   <10,000 CFU/mL Normal Urogenital Guera  --  --      .Blood Blood-Peripheral  12-06 @ 14:39   No growth to date.  --  Blood Culture PCR            RADIOLOGY:      ROS:  [  ] UNABLE TO ELICIT

## 2021-12-08 NOTE — PROGRESS NOTE ADULT - ASSESSMENT
Ms. Dunaway is an 80 yo F with a history of HTN, CVA w/o focal residual deficits, Alzheimer's dementia (AOx1 at baseline) who presents with lethargy over the past week likely i/s/o sepsis 2/2 hip infection vs. UTI.

## 2021-12-08 NOTE — PHYSICAL THERAPY INITIAL EVALUATION ADULT - ADDITIONAL COMMENTS
Information regarding pt's prior level of function was obtained by aide @ bedside as pt is a poor historian. Pt splits her time between her 2 daughters houses; 1 daughter's house has ~5 steps to enter; (+)bilateral handrails; bedroom/bathroom is on the first floor. Prior to hospital admission pt was ambulating both independently/ with assistance using a rolling walker. Pt has had no recent falls and has a home health aide 24 hours/day 7days/week.    Pt left comfortable in bed, NAD, all lines intact, all precautions maintained, with call bell in reach, bed alarm on,aide @ bedside, and RN aware of PT evaluation and pt having a bowel movement.

## 2021-12-08 NOTE — PROGRESS NOTE ADULT - ATTENDING COMMENTS
81F [employer] (per HHA - will have to confirm), with PMH of HTN, HLD, Alzheimer's dementia, stage 1 sacral ulcers who presents from home w/ increased fatigue and new onset of sores/blisters on her back. Imaging showed right hip soft tissue defect w/ edema adjacent to hip hardware. Admitted for further infectious workup. ID/ortho on board. Findings concerning for abscess vs. bursitis. Blood cx likely showing contaminant w/ coag neg staph.    Pt seen and evaluate at bedside. Pt at baseline mentation. No complaints of pain. Just had BM this AM.     -Continue Zosyn. Dose adjusted.  -Ortho/ID recs appreciated. Will cont to follow. Follow up imaging.  -Endometrial thickening is noted - however, consider age/cormobidities - further investigation is unlikely to alter overall prognosis. Will further discuss w/ family.    Rest of plan as above. 81F [employer] (per HHA - will have to confirm), with PMH of HTN, HLD, Alzheimer's dementia, stage 1 sacral ulcers who presents from home w/ increased fatigue and new onset of sores/blisters on her back. Imaging showed right hip soft tissue defect w/ edema adjacent to hip hardware. Admitted for further infectious workup. ID/ortho on board. Findings concerning for abscess vs. bursitis. Blood cx likely showing contaminant w/ coag neg staph.    Pt seen and evaluate at bedside. Pt at baseline mentation. No complaints of pain. Just had BM this AM.     -Continue Zosyn. Dose adjusted.  -F/u IR for aspiration of collection.   -Ortho/ID recs appreciated. Will cont to follow. Follow up imaging.  -Endometrial thickening is noted - however, consider age/cormobidities - further investigation is unlikely to alter overall prognosis. Will further discuss w/ family.    Rest of plan as above. 81F [employer] (per HHA - will have to confirm), with PMH of HTN, HLD, Alzheimer's dementia, stage 1 sacral ulcers who presents from home w/ increased fatigue and new onset of sores/blisters on her back. Imaging showed right hip soft tissue defect w/ edema adjacent to hip hardware. Admitted for further infectious workup. ID/ortho on board. Findings concerning for abscess vs. bursitis. Blood cx likely showing contaminant w/ coag neg staph.    Pt seen and evaluate at bedside. Pt at baseline mentation. No complaints of pain. Just had BM this AM.     -Continue Zosyn. Dose adjusted.  -Consider drainage of collection.  -Ortho/ID recs appreciated. Will cont to follow. Follow up imaging.  -Endometrial thickening is noted - however, consider age/cormobidities - further investigation is unlikely to alter overall prognosis. Will further discuss w/ family.    Rest of plan as above.

## 2021-12-09 DIAGNOSIS — R93.89 ABNORMAL FINDINGS ON DIAGNOSTIC IMAGING OF OTHER SPECIFIED BODY STRUCTURES: ICD-10-CM

## 2021-12-09 LAB
ANION GAP SERPL CALC-SCNC: 10 MMOL/L — SIGNIFICANT CHANGE UP (ref 7–14)
BUN SERPL-MCNC: 7 MG/DL — SIGNIFICANT CHANGE UP (ref 7–23)
CALCIUM SERPL-MCNC: 9 MG/DL — SIGNIFICANT CHANGE UP (ref 8.4–10.5)
CHLORIDE SERPL-SCNC: 107 MMOL/L — SIGNIFICANT CHANGE UP (ref 98–107)
CO2 SERPL-SCNC: 24 MMOL/L — SIGNIFICANT CHANGE UP (ref 22–31)
CREAT SERPL-MCNC: 0.79 MG/DL — SIGNIFICANT CHANGE UP (ref 0.5–1.3)
GLUCOSE SERPL-MCNC: 91 MG/DL — SIGNIFICANT CHANGE UP (ref 70–99)
HCT VFR BLD CALC: 32.8 % — LOW (ref 34.5–45)
HGB BLD-MCNC: 10.6 G/DL — LOW (ref 11.5–15.5)
MAGNESIUM SERPL-MCNC: 2.2 MG/DL — SIGNIFICANT CHANGE UP (ref 1.6–2.6)
MCHC RBC-ENTMCNC: 28.7 PG — SIGNIFICANT CHANGE UP (ref 27–34)
MCHC RBC-ENTMCNC: 32.3 GM/DL — SIGNIFICANT CHANGE UP (ref 32–36)
MCV RBC AUTO: 88.9 FL — SIGNIFICANT CHANGE UP (ref 80–100)
NRBC # BLD: 0 /100 WBCS — SIGNIFICANT CHANGE UP
NRBC # FLD: 0 K/UL — SIGNIFICANT CHANGE UP
PHOSPHATE SERPL-MCNC: 3.8 MG/DL — SIGNIFICANT CHANGE UP (ref 2.5–4.5)
PLATELET # BLD AUTO: 240 K/UL — SIGNIFICANT CHANGE UP (ref 150–400)
POTASSIUM SERPL-MCNC: 4.1 MMOL/L — SIGNIFICANT CHANGE UP (ref 3.5–5.3)
POTASSIUM SERPL-SCNC: 4.1 MMOL/L — SIGNIFICANT CHANGE UP (ref 3.5–5.3)
RBC # BLD: 3.69 M/UL — LOW (ref 3.8–5.2)
RBC # FLD: 14.8 % — HIGH (ref 10.3–14.5)
SODIUM SERPL-SCNC: 141 MMOL/L — SIGNIFICANT CHANGE UP (ref 135–145)
WBC # BLD: 9.24 K/UL — SIGNIFICANT CHANGE UP (ref 3.8–10.5)
WBC # FLD AUTO: 9.24 K/UL — SIGNIFICANT CHANGE UP (ref 3.8–10.5)

## 2021-12-09 PROCEDURE — 99232 SBSQ HOSP IP/OBS MODERATE 35: CPT

## 2021-12-09 PROCEDURE — 99232 SBSQ HOSP IP/OBS MODERATE 35: CPT | Mod: GC

## 2021-12-09 RX ADMIN — PIPERACILLIN AND TAZOBACTAM 25 GRAM(S): 4; .5 INJECTION, POWDER, LYOPHILIZED, FOR SOLUTION INTRAVENOUS at 21:46

## 2021-12-09 RX ADMIN — DONEPEZIL HYDROCHLORIDE 10 MILLIGRAM(S): 10 TABLET, FILM COATED ORAL at 21:46

## 2021-12-09 RX ADMIN — SIMVASTATIN 40 MILLIGRAM(S): 20 TABLET, FILM COATED ORAL at 21:46

## 2021-12-09 RX ADMIN — FLUPHENAZINE HYDROCHLORIDE 1 MILLIGRAM(S): 1 TABLET, FILM COATED ORAL at 13:46

## 2021-12-09 RX ADMIN — HEPARIN SODIUM 5000 UNIT(S): 5000 INJECTION INTRAVENOUS; SUBCUTANEOUS at 00:59

## 2021-12-09 RX ADMIN — PIPERACILLIN AND TAZOBACTAM 25 GRAM(S): 4; .5 INJECTION, POWDER, LYOPHILIZED, FOR SOLUTION INTRAVENOUS at 05:21

## 2021-12-09 RX ADMIN — HEPARIN SODIUM 5000 UNIT(S): 5000 INJECTION INTRAVENOUS; SUBCUTANEOUS at 07:01

## 2021-12-09 RX ADMIN — PIPERACILLIN AND TAZOBACTAM 25 GRAM(S): 4; .5 INJECTION, POWDER, LYOPHILIZED, FOR SOLUTION INTRAVENOUS at 13:46

## 2021-12-09 NOTE — PROGRESS NOTE ADULT - PROBLEM SELECTOR PLAN 8
DVT ppx: SQH  Diet: Soft & bite sized, mildly thick liquids  Dispo: PT rec- JONO, family amenable    FULL CODE  HCP: Daughter Alma Rosa [phone] - Continue home simvastatin

## 2021-12-09 NOTE — CONSULT NOTE ADULT - ASSESSMENT
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 UTI. CT showing small collection overlying the Right greater trochanter. IR consulted for drainage.      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 UTI. CT showing small collection overlying the Right greater trochanter. IR consulted for drainage.    -- IR will plan to perform   -- NPO at midnight on   -- hold a.m. 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 UTI. CT showing small collection overlying the Right greater trochanter. IR consulted for drainage.      Allergies:   Medications (Abx/Cardiac/Anticoagulation/Blood Products)    heparin   Injectable: 5000 Unit(s) SubCutaneous (12-09 @ 07:01)  piperacillin/tazobactam IVPB..: 25 mL/Hr IV Intermittent (12-09 @ 05:21)  vancomycin  IVPB: 250 mL/Hr IV Intermittent (12-07 @ 18:08)    Data:    T(C): 36.6  HR: 79  BP: 121/64  RR: 18  SpO2: 99%    -WBC 9.24 / HgB 10.6 / Hct 32.8 / Plt 240  -Na 141 / Cl 107 / BUN 7 / Glucose 91  -K 4.1 / CO2 24 / Cr 0.79  -ALT -- / Alk Phos -- / T.Bili --  -INR 1.12 / PTT 25.9      Radiology: CT AP 12/6:    Status post right femoral ORIF. Right hip soft tissue defect with subcutaneous infiltration and edema of the right hip and medial gluteal subcutaneous tissues.    Rim-enhancing collection overlying the left greater trochanter with adjacent subcutaneous edema. Differential includes bursitis or infection.    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 UTI. CT showing small collection overlying the Right greater trochanter. IR consulted for drainage.    -- collection too small to drain  -- IR can aspirate tomorrow for speciation with local  -- No need for NPO  -- hold a.m. anticoagulation  -- please complete IR pre-procedure note  -- please maintain COVID PCR within 5 days of planned procedure   -- please place IR procedure request order under Dr. Vasquez

## 2021-12-09 NOTE — CONSULT NOTE ADULT - ATTENDING COMMENTS
Above noted   Right hip US wound- no fluctuance - no visible scar- no infection
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 UTI. CT showing small collection overlying the Right greater trochanter. IR consulted for drainage.    -- collection too small to drain  -- IR can aspirate tomorrow for speciation with local  -- No need for NPO  -- hold a.m. anticoagulation  -- please complete IR pre-procedure note  -- please maintain COVID PCR within 5 days of planned procedure   -- please place IR procedure request order
Agree with resident assessment and plan.     Briefly, 81y F is a/w lethargy and sepsis. Called to eval bilateral hip skin breakdown in setting of R hip hardware.  Difficult examination/historian.  HHA at bedside, states patient amb at baseline w/walker and ambulating all up to Sunday.  HHA brought in Monday for skin blisters rupturing.  Per HHA, patient was eval by PMD for b/l skin blisters to hip but NTD.  Per primary team, patient had R prox femur ORIF in [place] 2017.  Resting comfortably, NAD, Alert and awake.    PE:   B/L hips w/ mepilex wound dressings - able to see superficial healing blisters that have ruptured  swelling L>R  Non significant tenderness throughout R femur  no erythema noted to skin BLE  motor exam somewhat difficult as patient fighting during exam - able to wiggles toes/ankles B/L  PROM of R hip is benign. Uncomfortable with L hip ROM  sensation BLE intact to light touch    WBC 13.5      XR R femur and pelvis pending  CT a/p; s/p hip IMN. Right hip soft tissue defect with subcutaneous infiltration and edema of the right hip and medial gluteal subcutaneous tissues. Rim-enhancing collection overlying the left greater trochanter with adjacent subcutaneous edema. Differential includes bursitis or infection.    A/P: 81 F s/p R hip IMN 2017 at OSH  - No concern R hip hardware infx. Benign clinical exam/CT imaging R hip  - Source infx unknown, unlikely ortho in origin. L hip with collection on CT, ? abcess.  - Pain control  - WBAT  - Care per 1o team. No ortho acute intervention R hip.  - Consider IR consult/surgery concult for fluid collection L hip  - Antibiotics per primary team  - Will follow
82 yo woman with dementia h/o CVA s/p ORIF right hip who was brought to hospital with fever, decreased ambulation due to right hip pain; found to have leucocytosis and CT evidence of right hip soft tissue defect and edema right hip soft tissue, rim enhancing collection overlying ?right great trochanter.  Suspect source of infection.  Concern for hardware infection.  ortho evaluating- await x ray.  Would continue with vanco and zosyn.  Will follow.  Rachell Dunbar MD  Pager: [phone]  After 5 PM or weekends please call fellow on call or office [phone]

## 2021-12-09 NOTE — PROGRESS NOTE ADULT - SUBJECTIVE AND OBJECTIVE BOX
81y Female    Reason for consult:  SIRS    Overnight/Subjective:  - more alert and interactive.  aide at bedside.     piperacillin/tazobactam IVPB.. 3.375 every 8 hours    Meds:  MEDICATIONS  (STANDING):  donepezil 10 at bedtime  fluPHENAZine 1 daily  influenza  Vaccine (HIGH DOSE) 0.7 once  simvastatin 40 at bedtime      Allergies    No Known Drug Allergies  shellfish (Swelling)    Intolerances    Vital Signs Last 24 Hrs  T(F): 97.8 (12-09-21 @ 12:18), Max: 98.5 (12-08-21 @ 21:48)  HR: 89 (12-09-21 @ 12:18)  BP: 142/88 (12-09-21 @ 12:18)  RR: 17 (12-09-21 @ 12:18)  SpO2: 100% (12-09-21 @ 12:18) (99% - 100%)    GENERAL: NAD, lying in bed comfortably  HEAD:  Atraumatic, Normocephalic  EYES: EOMI, PERRLA, conjunctiva and sclera clear  ENT: Moist mucous membranes  NECK: Supple, No JVD  CHEST/LUNG: Clear to auscultation bilaterally; No rales, rhonchi, wheezing, or rubs. Unlabored respirations  HEART: Regular rate and rhythm; No murmurs, rubs, or gallops  ABDOMEN: BSx4; Soft, nontender, nondistended  EXTREMITIES:  3 cm ulcer right hip  NERVOUS SYSTEM:  A&Ox1, no focal deficits   SKIN: No rashes or lesions      LABS/DIAGNOSTIC TESTS:                          10.6   9.24  )-----------( 240      ( 09 Dec 2021 07:40 )             32.8 12-09    141  |  107  |  7   ----------------------------<  91  4.1   |  24  |  0.79  Ca    9.0      09 Dec 2021 07:40Phos  3.8     12-09Mg     2.20     12-09              CULTURES: Clean Catch Clean Catch (Midstream)  12-06 @ 20:31   <10,000 CFU/mL Normal Urogenital Macey  --  --      .Blood Blood-Peripheral  12-06 @ 14:39   No growth to date.  --  Blood Culture PCR            RADIOLOGY:    r< from: Xray Femur 2 Views, Right (12.08.21 @ 17:20) >  ACC: [id number] EXAM:  XR FEMUR 2 VIEWS RT                        ACC: [id number] EXAM:  XR PELVIS AP ONLY 1-2 VIEWS                          PROCEDURE DATE:  12/08/2021          INTERPRETATION:  CLINICAL INDICATION: prior right hip fracture ORIF;   altered mental status; soft tissue wounds    EXAM:  AP pelvis and hips and separate dedicated AP and crosstable lateral right   femur from 12/8/2021 at 1720. Compared to appearance on abdomen/pelvis CT   from 12/6/2021.    IMPRESSION:  Intact and uncomplicated appearing proximal right femoral short IM   mohini/nail with proximal compression screw and distal interlocking screw.    No tracking gas collections or gross radiographic evidence for   osteomyelitis.    Healed and anatomically aligned underlying prior fracture repair site. No   dislocations or acute appearing fractures.    Preserved bilateral hip joint spaces. Right knee tricompartment   osteoarthritis with small knee joint effusion. Intra-articular medial   tibiofemoral chondrocalcinosis also apparent.    Generalized osteopenia otherwise no discrete lytic or blastic lesions.    Redemonstrated uterine fibroid related mid pelvic popcorn type   calcification.    --- End of Report ---            JERROD GRAHAM MD; Attending Radiologist  This document has been electronically signed. Dec  8 2021  5:50PM    < end of copied text >   Statement Selected

## 2021-12-09 NOTE — PROGRESS NOTE ADULT - PROBLEM SELECTOR PLAN 1
Patient admitted with lethargy, found to meet SIRS criteria by WBC, HR, temperature on admission  With new skin lesions on b/l hips and back, no other localizing symptoms  CRP elevated 124.2, lactate 2.4  12/6 CTAP revealed right hip soft tissue defect with subcutaneous infiltration and edema of the right hip and medial gluteal subcutaneous tissues. Rim-enhancing collection overlying the left greater trochanter with adjacent subcutaneous edema. Differential includes bursitis or infection.   UA with nitrite, LE, WBC, bacteria, urine culture with normal urogenital guera  S/p LR 75cc/hr x12 hours, lactate downtrended  Blood cultures with coag neg staph in 1/4 bottles, likely contaminate  MRSA swab negative  XR R femur/pelvis without any evidence of infection  - Zosyn (12/6-)  - Wound care MD team consulted- unlikely SSTI  - Ortho consulted, appreciate recs  - ID consulted, appreciate recs  - Trend WBC, fever curve  - Tylenol prn fever Patient admitted with lethargy, found to meet SIRS criteria by WBC, HR, temperature on admission  With new skin lesions on b/l hips and back, no other localizing symptoms  CRP elevated 124.2, lactate 2.4  12/6 CTAP revealed right hip soft tissue defect with subcutaneous infiltration and edema of the right hip and medial gluteal subcutaneous tissues. Rim-enhancing collection overlying the left greater trochanter with adjacent subcutaneous edema. Differential includes bursitis or infection.   UA with nitrite, LE, WBC, bacteria, urine culture with normal urogenital guera  S/p LR 75cc/hr x12 hours, lactate downtrended  Blood cultures with coag neg staph in 1/4 bottles, likely contaminate  MRSA swab negative  XR R femur/pelvis without any evidence of infection  - Zosyn (12/6-)  - Wound care MD team consulted- unlikely SSTI  - IR consulted per ortho recs for L trochanter collection- aspiration to be done tomorrow  - Ortho consulted, appreciate recs  - ID consulted, appreciate recs  - Trend WBC, fever curve  - Tylenol prn fever

## 2021-12-09 NOTE — PROGRESS NOTE ADULT - ASSESSMENT
Patient is 80 yo F with hx of HTN, Alzheimer's dementia (AOx1 at baseline) who presented 12/6 with lethargy over the past week found to be septic with possible  skin and soft tissue source     //Sepsis  With elevated lactate and TERENCE on admission. S/p clinda x1, vanc x1 and zosyn in ED.  vs skin/soft tissue/ joint source.    -UA grossly positive, unreliable historian in setting of advanced dementia  -R. hip subcutaneous edema with rim enhancement near greater trochanter suspicious for abscess vs bursitis  -s/p empiric vanco  -1/4 blood cx with CoNS, probable contaminant  -Urine cx with <10k CFU normal  guera  -C/w with zosyn, q8h  -Wound care eval, low concern for SSTI  -ortho eval, intact hardware    iR will aspirate collection for micro dx.  please clarify right vs left side.    Rachell Dunbar MD  Pager: [phone]  After 5 PM or weekends please call fellow on call or office [phone]

## 2021-12-09 NOTE — CHART NOTE - NSCHARTNOTEFT_GEN_A_CORE
Pre-Interventional Radiology Procedure Note    CHACE YARBROUGH | [id number]    12-09-21 @ 15:57    Interventional Radiology Attending Physician: Dr. Vasquez     Ordering Attending Physician: Jose F Bashir    Diagnosis/Indication: Patient is a 81y old  Female who presents with a chief complaint of Lethargy (09 Dec 2021 11:27)      Procedure: Aspiration of rim-enhancing collection of left greater trochanter    81y    Female    PAST MEDICAL & SURGICAL HISTORY:  Dementia  AOx1 at baseline    Hypertension    HLD (hyperlipidemia)    CVA (cerebrovascular accident)  2006    S/P ORIF (open reduction internal fixation) fracture         CBC Full  -  ( 09 Dec 2021 07:40 )  WBC Count : 9.24 K/uL  RBC Count : 3.69 M/uL  Hemoglobin : 10.6 g/dL  Hematocrit : 32.8 %  Platelet Count - Automated : 240 K/uL  Mean Cell Volume : 88.9 fL  Mean Cell Hemoglobin : 28.7 pg  Mean Cell Hemoglobin Concentration : 32.3 gm/dL  Auto Neutrophil # : x  Auto Lymphocyte # : x  Auto Monocyte # : x  Auto Eosinophil # : x  Auto Basophil # : x  Auto Neutrophil % : x  Auto Lymphocyte % : x  Auto Monocyte % : x  Auto Eosinophil % : x  Auto Basophil % : x    12-09    141  |  107  |  7   ----------------------------<  91  4.1   |  24  |  0.79    Ca    9.0      09 Dec 2021 07:40  Phos  3.8     12-09  Mg     2.20     12-09

## 2021-12-09 NOTE — PROGRESS NOTE ADULT - SUBJECTIVE AND OBJECTIVE BOX
PROGRESS NOTE:   Authored by Brody Goncalves MD  Pager: [facility] [phone]; [facility] [phone]    Patient is a 81y old  Female who presents with a chief complaint of Lethargy (08 Dec 2021 11:13)      SUBJECTIVE / OVERNIGHT EVENTS:    ADDITIONAL REVIEW OF SYSTEMS:    MEDICATIONS  (STANDING):  donepezil 10 milliGRAM(s) Oral at bedtime  fluPHENAZine 1 milliGRAM(s) Oral daily  heparin   Injectable 5000 Unit(s) SubCutaneous every 8 hours  influenza  Vaccine (HIGH DOSE) 0.7 milliLiter(s) IntraMuscular once  piperacillin/tazobactam IVPB.. 3.375 Gram(s) IV Intermittent every 8 hours  senna 2 Tablet(s) Oral at bedtime  simvastatin 40 milliGRAM(s) Oral at bedtime    MEDICATIONS  (PRN):      CAPILLARY BLOOD GLUCOSE        I&O's Summary    08 Dec 2021 07:01  -  09 Dec 2021 07:00  --------------------------------------------------------  IN: 436 mL / OUT: 800 mL / NET: -364 mL        PHYSICAL EXAM:  Vital Signs Last 24 Hrs  T(C): 36.6 (09 Dec 2021 05:20), Max: 36.9 (08 Dec 2021 21:48)  T(F): 97.9 (09 Dec 2021 05:20), Max: 98.5 (08 Dec 2021 21:48)  HR: 79 (09 Dec 2021 05:20) (79 - 99)  BP: 121/64 (09 Dec 2021 05:20) (115/60 - 140/76)  BP(mean): --  RR: 18 (09 Dec 2021 05:20) (17 - 18)  SpO2: 99% (09 Dec 2021 05:20) (99% - 100%)    GENERAL: No acute distress, well-developed  HEAD:  Atraumatic, Normocephalic  EYES: EOMI, PERRLA, conjunctiva and sclera clear  NECK: Supple, no lymphadenopathy, no JVD  CHEST/LUNG: CTAB; No wheezes, rales, or rhonchi  HEART: Regular rate and rhythm; No murmurs, rubs, or gallops  ABDOMEN: Soft, non-tender, non-distended; normal bowel sounds, no organomegaly  EXTREMITIES:  2+ peripheral pulses b/l, No clubbing, cyanosis, or edema  NEUROLOGY: A&O x 3, no focal deficits  SKIN: No rashes or lesions    LABS:                        11.3   9.62  )-----------( 223      ( 08 Dec 2021 07:38 )             34.5     12-08    142  |  106  |  10  ----------------------------<  83  3.9   |  23  |  0.77    Ca    9.2      08 Dec 2021 07:38  Phos  3.8     12-08  Mg     2.20     12-08    TPro  6.6  /  Alb  3.0<L>  /  TBili  0.4  /  DBili  x   /  AST  28  /  ALT  20  /  AlkPhos  82  12-07              Culture - Urine (collected 06 Dec 2021 20:31)  Source: Clean Catch Clean Catch (Midstream)  Final Report (07 Dec 2021 22:19):    <10,000 CFU/mL Normal Urogenital Macey    Culture - Blood (collected 06 Dec 2021 14:39)  Source: .Blood Blood-Peripheral  Preliminary Report (07 Dec 2021 15:02):    No growth to date.    Culture - Blood (collected 06 Dec 2021 12:45)  Source: .Blood Blood-Peripheral  Gram Stain (07 Dec 2021 20:28):    Growth in anaerobic bottle: Gram Positive Cocci in Clusters  Final Report (08 Dec 2021 18:00):    Growth in anaerobic bottle: Staphylococcus warneri    Coag Negative Staphylococcus    Single set isolate, possible contaminant. Contact    Microbiology if susceptibility testing clinically    indicated.    ***Blood Panel PCR results on this specimen are available    approximately 3 hours after the Gram stain result.***    Gram stain, PCR, and/or culture results may not always    correspond due to difference in methodologies.    ************************************************************    This PCR assay was performed by multiplex PCR. This    Assay tests for 66 bacterial and resistance gene targets.    Please refer to the [facility] test directory    at [email or web address] for details.  Organism: Blood Culture PCR (08 Dec 2021 18:00)  Organism: Blood Culture PCR (08 Dec 2021 18:00)        RADIOLOGY & ADDITIONAL TESTS:  Results Reviewed:   Imaging Personally Reviewed:  Electrocardiogram Personally Reviewed:    COORDINATION OF CARE:  Care Discussed with Consultants/Other Providers [Y/N]:  Prior or Outpatient Records Reviewed [Y/N]:   PROGRESS NOTE:   Authored by Brody Goncalves MD  Pager: [facility] [phone]; [facility] [phone]    Patient is a 81y old  Female who presents with a chief complaint of Lethargy (08 Dec 2021 11:13)      SUBJECTIVE / OVERNIGHT EVENTS:  Overnight, patient reportedly had 7x bowel movements after given senna, was previously constipated.  This morning, she is lying comfortable in bed. AOx1.    ADDITIONAL REVIEW OF SYSTEMS:  Unable to assess given patient's mental status.    MEDICATIONS  (STANDING):  donepezil 10 milliGRAM(s) Oral at bedtime  fluPHENAZine 1 milliGRAM(s) Oral daily  heparin   Injectable 5000 Unit(s) SubCutaneous every 8 hours  influenza  Vaccine (HIGH DOSE) 0.7 milliLiter(s) IntraMuscular once  piperacillin/tazobactam IVPB.. 3.375 Gram(s) IV Intermittent every 8 hours  senna 2 Tablet(s) Oral at bedtime  simvastatin 40 milliGRAM(s) Oral at bedtime    MEDICATIONS  (PRN):      CAPILLARY BLOOD GLUCOSE        I&O's Summary    08 Dec 2021 07:01  -  09 Dec 2021 07:00  --------------------------------------------------------  IN: 436 mL / OUT: 800 mL / NET: -364 mL        PHYSICAL EXAM:  Vital Signs Last 24 Hrs  T(C): 36.6 (09 Dec 2021 05:20), Max: 36.9 (08 Dec 2021 21:48)  T(F): 97.9 (09 Dec 2021 05:20), Max: 98.5 (08 Dec 2021 21:48)  HR: 79 (09 Dec 2021 05:20) (79 - 99)  BP: 121/64 (09 Dec 2021 05:20) (115/60 - 140/76)  BP(mean): --  RR: 18 (09 Dec 2021 05:20) (17 - 18)  SpO2: 99% (09 Dec 2021 05:20) (99% - 100%)    GENERAL: No acute distress, well-developed, elderly appearing  NECK: Supple, no JVD  CHEST/LUNG: CTAB; No wheezes, rales, or rhonchi  HEART: Regular rate and rhythm; Soft systolic murmur  ABDOMEN: Soft, non-tender, non-distended; normal bowel sounds  EXTREMITIES:  2+ peripheral pulses b/l, No clubbing, cyanosis, or edema  NEUROLOGY: A&O x 1  SKIN: Prior documented hip & sacral lesions noted, unchanged    LABS:                        11.3   9.62  )-----------( 223      ( 08 Dec 2021 07:38 )             34.5     12-08    142  |  106  |  10  ----------------------------<  83  3.9   |  23  |  0.77    Ca    9.2      08 Dec 2021 07:38  Phos  3.8     12-08  Mg     2.20     12-08    TPro  6.6  /  Alb  3.0<L>  /  TBili  0.4  /  DBili  x   /  AST  28  /  ALT  20  /  AlkPhos  82  12-07              Culture - Urine (collected 06 Dec 2021 20:31)  Source: Clean Catch Clean Catch (Midstream)  Final Report (07 Dec 2021 22:19):    <10,000 CFU/mL Normal Urogenital Macey    Culture - Blood (collected 06 Dec 2021 14:39)  Source: .Blood Blood-Peripheral  Preliminary Report (07 Dec 2021 15:02):    No growth to date.    Culture - Blood (collected 06 Dec 2021 12:45)  Source: .Blood Blood-Peripheral  Gram Stain (07 Dec 2021 20:28):    Growth in anaerobic bottle: Gram Positive Cocci in Clusters  Final Report (08 Dec 2021 18:00):    Growth in anaerobic bottle: Staphylococcus warneri    Coag Negative Staphylococcus    Single set isolate, possible contaminant. Contact    Microbiology if susceptibility testing clinically    indicated.    ***Blood Panel PCR results on this specimen are available    approximately 3 hours after the Gram stain result.***    Gram stain, PCR, and/or culture results may not always    correspond due to difference in methodologies.    ************************************************************    This PCR assay was performed by multiplex PCR. This    Assay tests for 66 bacterial and resistance gene targets.    Please refer to the [facility] test directory    at [email or web address] for details.  Organism: Blood Culture PCR (08 Dec 2021 18:00)  Organism: Blood Culture PCR (08 Dec 2021 18:00)        RADIOLOGY & ADDITIONAL TESTS:    ACC: [id number] EXAM:  XR FEMUR 2 VIEWS RT                        ACC: [id number] EXAM:  XR PELVIS AP ONLY 1-2 VIEWS                          PROCEDURE DATE:  12/08/2021      IMPRESSION:  Intact and uncomplicated appearing proximal right femoral short IM   mohini/nail with proximal compression screw and distal interlocking screw.    No tracking gas collections or gross radiographic evidence for   osteomyelitis.    Healed and anatomically aligned underlying prior fracture repair site. No   dislocations or acute appearing fractures.    Preserved bilateral hip joint spaces. Right knee tricompartment   osteoarthritis with small knee joint effusion. Intra-articular medial   tibiofemoral chondrocalcinosis also apparent.    Generalized osteopenia otherwise no discrete lytic or blastic lesions.    Redemonstrated uterine fibroid related mid pelvic popcorn type   calcification.

## 2021-12-09 NOTE — CONSULT NOTE ADULT - CONSULT REASON
called for h/o R prox femur ORIF w/ b/l hip skin breakdown
Sacrum and Right trochanter pressure Injuries.
drainage of collection overlying Right greater trochanter
Sepsis

## 2021-12-09 NOTE — PROGRESS NOTE ADULT - ATTENDING COMMENTS
81F Presybeterian (per HHA - will have to confirm), with PMH of HTN, HLD, Alzheimer's dementia, stage 1 sacral ulcers who presents from home w/ increased fatigue and new onset of sores/blisters on her back. Imaging showed right hip soft tissue defect w/ edema adjacent to hip hardware. Admitted for further infectious workup. ID/ortho on board. Findings concerning for abscess vs. bursitis. Blood cx likely showing contaminant w/ coag neg staph.    Pt seen and evaluate at bedside. Sitter present. Pt is essentially back to baseline. No complaints of pain.    -Continue Zosyn.   -Aspiration of collection by IR planned.   -Ortho/ID recs appreciated. Will cont to follow.   -Endometrial thickening is noted - however, consider age/comorbidities - further investigation is unlikely to alter overall prognosis. Will further discuss w/ family.    Rest of plan as above.

## 2021-12-09 NOTE — PROGRESS NOTE ADULT - PROBLEM SELECTOR PLAN 3
Resolved  With Cr to 1.70 on admission  Downtrended to 0.77 s/p LR  - S/p IVF  - Trend BMP  - Strict Is/Os

## 2021-12-09 NOTE — PROGRESS NOTE ADULT - PROBLEM SELECTOR PLAN 6
- Holding home amlodipine, carvedilol, ramipril i/s/o sepsis Patient with remote history of CVA  - Continue home simvastatin

## 2021-12-09 NOTE — PROGRESS NOTE ADULT - SUBJECTIVE AND OBJECTIVE BOX
Patient seen and examined at [facility]. Comfortable. No pain at rest.     Briefly, 81y F is a/w lethargy and sepsis. Called to eval bilateral hip skin breakdown in setting of R hip hardware.  Difficult examination/historian.  HHA at bedside, states patient amb at baseline w/walker and ambulating all up to Sunday.  HHA brought in Monday for skin blisters rupturing.  Per HHA, patient was eval by PMD for b/l skin blisters to hip but NTD.  Per primary team, patient had R prox femur ORIF in [place] 2017.  Resting comfortably, NAD, Alert and awake. ESR/CRP high, WBC trending down.     PE:   B/L hips w/ mepilex wound dressings  swelling L>R  Non significant tenderness throughout R femur  no erythema/warmth noted to skin BLE  PROM of R hip is benign. Uncomfortable with L hip ROM  sensation BLE intact to light touch      XR R femur and pelvis; R hip IMN. No suspicion for infx/bone involvement.    CT a/p; s/p hip IMN. Right hip soft tissue defect with subcutaneous infiltration and edema of the right hip and medial gluteal subcutaneous tissues. Rim-enhancing collection overlying the LEFT greater trochanter with adjacent subcutaneous edema. Differential includes bursitis or infection.    A/P: 81 F s/p R hip IMN 2017 at OSH  - Continued lack of concern for RIGHT hip involvement, s/p IMN.  - Pending LEFT hip soft tissue aspiration in am to eval/treat abcess.   - No ortho intervention R hip currently benign  - Pain control  - WBAT  - Care per 1o team  - Antibiotics per primary team  - Will follow peripherally. Please contact as needed.  Patient seen and examined at [facility]. Comfortable. No pain at rest.     Briefly, 81y F is a/w lethargy and sepsis. Called to eval bilateral hip skin breakdown in setting of R hip hardware.  Difficult examination/historian.  HHA at bedside, states patient amb at baseline w/walker and ambulating all up to Sunday.  HHA brought in Monday for skin blisters rupturing.  Per HHA, patient was eval by PMD for b/l skin blisters to hip but NTD.  Per primary team, patient had R prox femur ORIF in [place] 2017.  Resting comfortably, NAD, Alert and awake. ESR/CRP high, WBC trending down.     PE:   B/L hips w/ mepilex wound dressings  swelling L>R  Non significant tenderness throughout R femur  no erythema/warmth noted to skin BLE  PROM of R hip is benign. Uncomfortable with L hip ROM  sensation BLE intact to light touch      XR R femur and pelvis; R hip IMN. No suspicion for infx/bone involvement.    CT a/p; s/p hip IMN. Right hip soft tissue defect with subcutaneous infiltration and edema of the right hip and medial gluteal subcutaneous tissues. Rim-enhancing collection overlying the LEFT greater trochanter with adjacent subcutaneous edema. Differential includes bursitis or infection.    A/P: 81 F s/p R hip IMN 2017 at OSH  - Continued lack of concern for RIGHT hip involvement, s/p IMN.  - Pending LEFT hip soft tissue aspiration in am to eval/treat abcess; on my CT eval this rim enhancing lesion over trochanter is very small measuring ~1.5cm, so unclear whether source of current sepsis. Will determine success of aspirate tomorrow.   - No ortho intervention R hip currently benign, L ST collection tiny.  - Pain control  - WBAT  - Care per 1o team  - Antibiotics per primary team  - Will follow peripherally. Please contact as needed.

## 2021-12-09 NOTE — PROGRESS NOTE ADULT - ASSESSMENT
Ms. Dunaway is an 80 yo F with a history of HTN, CVA w/o focal residual deficits, Alzheimer's dementia (AOx1 at baseline) who presents with lethargy over the past week likely i/s/o sepsis 2/2 hip infection vs. UTI. Ms. Dunaway is an 82 yo F with a history of HTN, CVA w/o focal residual deficits, Alzheimer's dementia (AOx1 at baseline) who presents with lethargy over the past week likely i/s/o collection near L greater trochanter vs. UTI, improved on Zosyn.

## 2021-12-09 NOTE — PROGRESS NOTE ADULT - PROBLEM SELECTOR PLAN 2
Patient with UA significant for nitrite, LE, WBC, bacteria  Urine culture with normal urogenital guera  Likely not true urinary tract infection  - Continue Zosyn (12/6-) per above  - Stool burden noted on CTAP, continue bowel regimen Patient with UA significant for nitrite, LE, WBC, bacteria  Urine culture with normal urogenital guera  Likely not true urinary tract infection  - Continue Zosyn (12/6-) per above  - Stool burden noted on CTAP, bowel regimen held 2/2 multiple bowel movements overnight

## 2021-12-09 NOTE — PROGRESS NOTE ADULT - PROBLEM SELECTOR PLAN 5
Patient with remote history of CVA  - Continue home simvastatin CTAP noted fibroid uterus. Question endometrial thickening. Nonemergent pelvic ultrasound can be performed for further evaluation.  - Will discuss findings with patient's family  - If aligned with GOC, can proceed with outpatient evaluation

## 2021-12-09 NOTE — PROGRESS NOTE ADULT - PROBLEM SELECTOR PLAN 9
DVT ppx: SQH  Diet: Soft & bite sized, mildly thick liquids  Dispo: PT rec- [facility], family amenable    FULL CODE  HCP: Daughter Alma Rosa [phone]    Patient is Yazidi per HCP- should not receive blood products

## 2021-12-10 ENCOUNTER — TRANSCRIPTION ENCOUNTER (OUTPATIENT)
Age: 81
End: 2021-12-10

## 2021-12-10 LAB
ANION GAP SERPL CALC-SCNC: 15 MMOL/L — HIGH (ref 7–14)
APTT BLD: 27 SEC — SIGNIFICANT CHANGE UP (ref 27–36.3)
BUN SERPL-MCNC: 6 MG/DL — LOW (ref 7–23)
CALCIUM SERPL-MCNC: 9.3 MG/DL — SIGNIFICANT CHANGE UP (ref 8.4–10.5)
CHLORIDE SERPL-SCNC: 107 MMOL/L — SIGNIFICANT CHANGE UP (ref 98–107)
CO2 SERPL-SCNC: 20 MMOL/L — LOW (ref 22–31)
CREAT SERPL-MCNC: 0.78 MG/DL — SIGNIFICANT CHANGE UP (ref 0.5–1.3)
GLUCOSE SERPL-MCNC: 88 MG/DL — SIGNIFICANT CHANGE UP (ref 70–99)
HCT VFR BLD CALC: 35 % — SIGNIFICANT CHANGE UP (ref 34.5–45)
HGB BLD-MCNC: 11.5 G/DL — SIGNIFICANT CHANGE UP (ref 11.5–15.5)
INR BLD: 0.98 RATIO — SIGNIFICANT CHANGE UP (ref 0.88–1.16)
INR BLD: 1.07 RATIO — SIGNIFICANT CHANGE UP (ref 0.88–1.16)
MAGNESIUM SERPL-MCNC: 2.2 MG/DL — SIGNIFICANT CHANGE UP (ref 1.6–2.6)
MCHC RBC-ENTMCNC: 28.1 PG — SIGNIFICANT CHANGE UP (ref 27–34)
MCHC RBC-ENTMCNC: 32.9 GM/DL — SIGNIFICANT CHANGE UP (ref 32–36)
MCV RBC AUTO: 85.6 FL — SIGNIFICANT CHANGE UP (ref 80–100)
NRBC # BLD: 0 /100 WBCS — SIGNIFICANT CHANGE UP
NRBC # FLD: 0 K/UL — SIGNIFICANT CHANGE UP
PHOSPHATE SERPL-MCNC: 3.4 MG/DL — SIGNIFICANT CHANGE UP (ref 2.5–4.5)
PLATELET # BLD AUTO: 286 K/UL — SIGNIFICANT CHANGE UP (ref 150–400)
POTASSIUM SERPL-MCNC: 3.6 MMOL/L — SIGNIFICANT CHANGE UP (ref 3.5–5.3)
POTASSIUM SERPL-SCNC: 3.6 MMOL/L — SIGNIFICANT CHANGE UP (ref 3.5–5.3)
PROTHROM AB SERPL-ACNC: 11.3 SEC — SIGNIFICANT CHANGE UP (ref 10.6–13.6)
PROTHROM AB SERPL-ACNC: 12.2 SEC — SIGNIFICANT CHANGE UP (ref 10.6–13.6)
RBC # BLD: 4.09 M/UL — SIGNIFICANT CHANGE UP (ref 3.8–5.2)
RBC # FLD: 14.7 % — HIGH (ref 10.3–14.5)
SARS-COV-2 RNA SPEC QL NAA+PROBE: SIGNIFICANT CHANGE UP
SODIUM SERPL-SCNC: 142 MMOL/L — SIGNIFICANT CHANGE UP (ref 135–145)
WBC # BLD: 8.49 K/UL — SIGNIFICANT CHANGE UP (ref 3.8–10.5)
WBC # FLD AUTO: 8.49 K/UL — SIGNIFICANT CHANGE UP (ref 3.8–10.5)

## 2021-12-10 PROCEDURE — 99232 SBSQ HOSP IP/OBS MODERATE 35: CPT

## 2021-12-10 PROCEDURE — 99232 SBSQ HOSP IP/OBS MODERATE 35: CPT | Mod: GC

## 2021-12-10 RX ORDER — CEPHALEXIN 500 MG
500 CAPSULE ORAL
Refills: 0 | Status: DISCONTINUED | OUTPATIENT
Start: 2021-12-10 | End: 2021-12-13

## 2021-12-10 RX ORDER — HEPARIN SODIUM 5000 [USP'U]/ML
5000 INJECTION INTRAVENOUS; SUBCUTANEOUS EVERY 8 HOURS
Refills: 0 | Status: DISCONTINUED | OUTPATIENT
Start: 2021-12-10 | End: 2021-12-13

## 2021-12-10 RX ORDER — CEPHALEXIN 500 MG
1 CAPSULE ORAL
Qty: 0 | Refills: 0 | DISCHARGE
Start: 2021-12-10

## 2021-12-10 RX ADMIN — Medication 500 MILLIGRAM(S): at 23:03

## 2021-12-10 RX ADMIN — PIPERACILLIN AND TAZOBACTAM 25 GRAM(S): 4; .5 INJECTION, POWDER, LYOPHILIZED, FOR SOLUTION INTRAVENOUS at 05:30

## 2021-12-10 RX ADMIN — Medication 500 MILLIGRAM(S): at 17:50

## 2021-12-10 RX ADMIN — DONEPEZIL HYDROCHLORIDE 10 MILLIGRAM(S): 10 TABLET, FILM COATED ORAL at 22:08

## 2021-12-10 RX ADMIN — Medication 500 MILLIGRAM(S): at 12:17

## 2021-12-10 RX ADMIN — FLUPHENAZINE HYDROCHLORIDE 1 MILLIGRAM(S): 1 TABLET, FILM COATED ORAL at 12:11

## 2021-12-10 RX ADMIN — SIMVASTATIN 40 MILLIGRAM(S): 20 TABLET, FILM COATED ORAL at 22:08

## 2021-12-10 RX ADMIN — HEPARIN SODIUM 5000 UNIT(S): 5000 INJECTION INTRAVENOUS; SUBCUTANEOUS at 22:08

## 2021-12-10 NOTE — PROGRESS NOTE ADULT - PROBLEM SELECTOR PLAN 2
Patient with UA significant for nitrite, LE, WBC, bacteria  Urine culture with normal urogenital guera  Likely not true urinary tract infection  - Continue Zosyn (12/6-) per above  - Stool burden noted on CTAP, bowel regimen held 2/2 multiple bowel movements overnight Patient with UA significant for nitrite, LE, WBC, bacteria  Urine culture with normal urogenital guera  Likely not true urinary tract infection  - Zosyn (12/6-12/10), will be d/c'ed on Keflex

## 2021-12-10 NOTE — PROGRESS NOTE ADULT - ASSESSMENT
Ms. Dunaway is an 82 yo F with a history of HTN, CVA w/o focal residual deficits, Alzheimer's dementia (AOx1 at baseline) who presents with lethargy over the past week likely i/s/o collection near L greater trochanter vs. UTI, improved on Zosyn. Ms. Dunaway is an 80 yo F with a history of HTN, CVA w/o focal residual deficits, Alzheimer's dementia (AOx1 at baseline) who presents with lethargy over the past week likely i/s/o SSTI vs. UTI, improved on Zosyn.

## 2021-12-10 NOTE — DISCHARGE NOTE PROVIDER - HOSPITAL COURSE
Ms. 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 Per the patient's home health aid, she noticed an abnormality in the patient's R hip for which she visited her primary care physician, who did not recommend further workup. Over the past few days, she has developed worsening sore in the area of the R hip as well as a blister on the L hip and the sacral area. Aside from these worsening wounds and lethargy, she states that the patient has not had any other symptoms. She denies fevers, chills, shortness of breath, chest pain, cough, abdominal pain, N/V/D, changes in urination.    On presentation to the ED, the patient was febrile to 100.8, tachycardic to 104. Hypotensive to 83/53 respirations 20, O2 sat 99% on room air. Her labs were significant for WBC 19.36, BUN/Cr 24/1.70. CXR showed clear lungs, CTAP revealed s/p R femoral ORIF, right hip soft tissue defect with subcutaneous infiltration and edema of the right hip and medial gluteal subcutaneous tissues. Rim-enhancing collection overlying the left greater trochanter with adjacent subcutaneous edema. Differential includes bursitis or infection. Pelvic MRI may be helpful for further evaluation. She received clindamycin, Zosyn, and vancomycin & a 1L bolus of NS and was admitted to medicine for further management.    During her hospitalization, she was continued on Zosyn and vancomycin. Vanc was d/c'ed as MRSA swab was negative. A urinalysis was positive, but a urine culture showed small amounts of normal urogenital guera. She had clinical improvement without further fevers and normalization of her vitals on Zosyn. She was seen by wound care for her new wounds who provided recommendations. She was seen by orthopedics and IR for possible intervention for the other CT findings, but ultimately no intervention was planned, as she clinically improved with Zosyn alone. Infectious disease ultimately believes the sepsis may have been due to a skin/soft tissue infection, and recommended to complete an additional 10 days of cephalexin at rehab.     Of note, she was also noted to have endometrial thickening on CTAP during her hospitalization. Outpatient OB/GYN assessment is indicated should that be within the family's wishes. In addition, her cardiac medications were held on admission given her low blood pressure in the setting of sepsis. For d/c, her carvedilol will be restarted, but her amlodipine and lisinopril will be held. She should follow up with her primary care provider to discuss restarting these medications. Ms. 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 Per the patient's home health aid, she noticed an abnormality in the patient's R hip for which she visited her primary care physician, who did not recommend further workup. Over the past few days, she has developed worsening sore in the area of the R hip as well as a blister on the L hip and the sacral area. Aside from these worsening wounds and lethargy, she states that the patient has not had any other symptoms. She denies fevers, chills, shortness of breath, chest pain, cough, abdominal pain, N/V/D, changes in urination.    On presentation to the ED, the patient was febrile to 100.8, tachycardic to 104. Hypotensive to 83/53 respirations 20, O2 sat 99% on room air. Her labs were significant for WBC 19.36, BUN/Cr 24/1.70. CXR showed clear lungs, CTAP revealed s/p R femoral ORIF, right hip soft tissue defect with subcutaneous infiltration and edema of the right hip and medial gluteal subcutaneous tissues. Rim-enhancing collection overlying the left greater trochanter with adjacent subcutaneous edema. Differential includes bursitis or infection. Pelvic MRI may be helpful for further evaluation. She received clindamycin, Zosyn, and vancomycin & a 1L bolus of NS and was admitted to medicine for further management.    During her hospitalization, she was continued on Zosyn and vancomycin. Vanc was d/c'ed as MRSA swab was negative. A urinalysis was positive, but a urine culture showed small amounts of normal urogenital guera. She had clinical improvement without further fevers and normalization of her vitals on Zosyn. She was seen by wound care for her new wounds who provided recommendations. She was seen by orthopedics and IR for possible intervention for the other CT findings, but ultimately no intervention was planned, as she clinically improved with Zosyn alone. Infectious disease ultimately believes the sepsis may have been due to a skin/soft tissue infection, and recommended to complete an additional 10 days of cephalexin at rehab. She should follow up with PMD & wound care on d/c.     Of note, she was also noted to have endometrial thickening on CTAP during her hospitalization. Outpatient OB/GYN assessment is indicated should that be within the family's wishes. In addition, her cardiac medications were held on admission given her low blood pressure in the setting of sepsis. For d/c, her carvedilol will be restarted, but her amlodipine and lisinopril will be held. She should follow up with her primary care provider to discuss restarting these medications.

## 2021-12-10 NOTE — PROGRESS NOTE ADULT - PROBLEM SELECTOR PLAN 5
CTAP noted fibroid uterus. Question endometrial thickening. Nonemergent pelvic ultrasound can be performed for further evaluation.  - Will discuss findings with patient's family  - If aligned with GOC, can proceed with outpatient evaluation

## 2021-12-10 NOTE — DISCHARGE NOTE PROVIDER - CARE PROVIDERS DIRECT ADDRESSES
,DirectAddress_Unknown ,DirectAddress_Unknown,[email or web address]

## 2021-12-10 NOTE — PROGRESS NOTE ADULT - SUBJECTIVE AND OBJECTIVE BOX
81y Female    Reason for consult:  SIRS    Overnight/Subjective:  - remains afebrile since admission   aspiration of hip soft tissue deferred.    piperacillin/tazobactam IVPB.. 3.375 every 8 hours    Meds:  MEDICATIONS  (STANDING):  donepezil 10 at bedtime  fluPHENAZine 1 daily  influenza  Vaccine (HIGH DOSE) 0.7 once  simvastatin 40 at bedtime      Allergies    No Known Drug Allergies  shellfish (Swelling)    Intolerances    Vital Signs Last 24 Hrs  T(F): 98.4 (12-10-21 @ 12:06), Max: 98.4 (12-10-21 @ 12:06)  HR: 95 (12-10-21 @ 12:06)  BP: 118/60 (12-10-21 @ 12:06)  RR: 17 (12-10-21 @ 12:06)  SpO2: 100% (12-10-21 @ 12:06) (100% - 100%)    GENERAL: NAD, lying in bed comfortably  HEAD:  Atraumatic, Normocephalic  EYES: EOMI, PERRLA, conjunctiva and sclera clear  ENT: Moist mucous membranes  NECK: Supple, No JVD  CHEST/LUNG: Clear to auscultation bilaterally; No rales, rhonchi, wheezing, or rubs. Unlabored respirations  HEART: Regular rate and rhythm; No murmurs, rubs, or gallops  ABDOMEN: BSx4; Soft, nontender, nondistended  EXTREMITIES:  3 cm ulcer right hip  NERVOUS SYSTEM:  A&Ox1, no focal deficits   SKIN: No rashes or lesions      LABS/DIAGNOSTIC TESTS:                          11.5   8.49  )-----------( 286      ( 10 Dec 2021 08:27 )             35.0 12-10    142  |  107  |  6   ----------------------------<  88  3.6   |  20  |  0.78  Ca    9.3      10 Dec 2021 08:27Phos  3.4     12-10Mg     2.20     12-10                CULTURES: Clean Catch Clean Catch (Midstream)  12-06 @ 20:31   <10,000 CFU/mL Normal Urogenital Macey  --  --      .Blood Blood-Peripheral  12-06 @ 14:39   No growth to date.  --  Blood Culture PCR      clSpecimen Source: .Blood Blood-Peripheral   Organism: Blood Culture PCR   Culture Results:   Growth in anaerobic bottle: Staphylococcus warneri       RADIOLOGY:    r< from: Xray Femur 2 Views, Right (12.08.21 @ 17:20) >  ACC: [id number] EXAM:  XR FEMUR 2 VIEWS RT                        ACC: [id number] EXAM:  XR PELVIS AP ONLY 1-2 VIEWS                          PROCEDURE DATE:  12/08/2021          INTERPRETATION:  CLINICAL INDICATION: prior right hip fracture ORIF;   altered mental status; soft tissue wounds    EXAM:  AP pelvis and hips and separate dedicated AP and crosstable lateral right   femur from 12/8/2021 at 1720. Compared to appearance on abdomen/pelvis CT   from 12/6/2021.    IMPRESSION:  Intact and uncomplicated appearing proximal right femoral short IM   mohini/nail with proximal compression screw and distal interlocking screw.    No tracking gas collections or gross radiographic evidence for   osteomyelitis.    Healed and anatomically aligned underlying prior fracture repair site. No   dislocations or acute appearing fractures.    Preserved bilateral hip joint spaces. Right knee tricompartment   osteoarthritis with small knee joint effusion. Intra-articular medial   tibiofemoral chondrocalcinosis also apparent.    Generalized osteopenia otherwise no discrete lytic or blastic lesions.    Redemonstrated uterine fibroid related mid pelvic popcorn type   calcification.    --- End of Report ---            JERROD GRAHAM MD; Attending Radiologist  This document has been electronically signed. Dec  8 2021  5:50PM    < end of copied text >

## 2021-12-10 NOTE — DISCHARGE NOTE PROVIDER - NSDCFUADDINST_GEN_ALL_CORE_FT
Wound Care:  -->Right hip- cleanse wound and periwound with NS. Pat dry. Apply liquid barrier film to periwound skin. Apply medihoney gel to wound base. cover with silicone foam with border. Change daily.  -->Sacrum, bilateral buttocks- provider perineal care every shift and prn with episodes of incontinence. Apply 3M liquid barrier advanced three times a week.  Continue to offload pressure, continue use of positioning devices and complete cair boots.

## 2021-12-10 NOTE — PROGRESS NOTE ADULT - PROBLEM SELECTOR PLAN 9
DVT ppx: SQH  Diet: Soft & bite sized, mildly thick liquids  Dispo: PT rec- [facility], family amenable    FULL CODE  HCP: Daughter Alma Rosa [phone]    Patient is Religious per HCP- should not receive blood products DVT ppx: SQH  Diet: Soft & bite sized, mildly thick liquids  Dispo: [facility]    FULL CODE  HCP: Daughter Alma Rosa [phone]    Patient is Sikh per HCP- should not receive blood products

## 2021-12-10 NOTE — DISCHARGE NOTE PROVIDER - NSDCFUADDAPPT_GEN_ALL_CORE_FT
During your hospitalization, you were found to have thickening of the endometrium, which is the inside part of your uterus. This can be worked up by the gynecologists outpatient should you wish. I have included here the contact information for our gynecologists. You can call them to schedule an appointment if desired.

## 2021-12-10 NOTE — DISCHARGE NOTE PROVIDER - CARE PROVIDER_API CALL
NATHAN LANGFORD  Internal Medicine  [address] [place] [address]  Phone: [phone]  Fax: [phone]  Established Patient  Follow Up Time: 2 weeks   NATHAN LANGFORD  Internal Medicine  [address] [place] [address]  Phone: [phone]  Fax: [phone]  Established Patient  Follow Up Time: 2 weeks    Avery Snyder)  Surgery  [facility] [address], [address]  [place] [address]  Phone: [phone]  Fax: [phone]  Follow Up Time: 2 weeks

## 2021-12-10 NOTE — PROGRESS NOTE ADULT - SUBJECTIVE AND OBJECTIVE BOX
PROGRESS NOTE:   Authored by Brody Goncalves MD  Pager: [facility] [phone]; [facility] [phone]    Patient is a 81y old  Female who presents with a chief complaint of Lethargy (09 Dec 2021 17:42)      SUBJECTIVE / OVERNIGHT EVENTS:    ADDITIONAL REVIEW OF SYSTEMS:    MEDICATIONS  (STANDING):  donepezil 10 milliGRAM(s) Oral at bedtime  fluPHENAZine 1 milliGRAM(s) Oral daily  influenza  Vaccine (HIGH DOSE) 0.7 milliLiter(s) IntraMuscular once  piperacillin/tazobactam IVPB.. 3.375 Gram(s) IV Intermittent every 8 hours  simvastatin 40 milliGRAM(s) Oral at bedtime    MEDICATIONS  (PRN):      CAPILLARY BLOOD GLUCOSE        I&O's Summary      PHYSICAL EXAM:  Vital Signs Last 24 Hrs  T(C): 36.6 (10 Dec 2021 05:22), Max: 36.6 (09 Dec 2021 12:18)  T(F): 97.8 (10 Dec 2021 05:22), Max: 97.8 (09 Dec 2021 12:18)  HR: 98 (10 Dec 2021 05:22) (89 - 98)  BP: 109/71 (10 Dec 2021 05:22) (109/71 - 142/88)  BP(mean): --  RR: 16 (10 Dec 2021 05:22) (16 - 17)  SpO2: 100% (10 Dec 2021 05:22) (100% - 100%)    GENERAL: No acute distress, well-developed  HEAD:  Atraumatic, Normocephalic  EYES: EOMI, PERRLA, conjunctiva and sclera clear  NECK: Supple, no lymphadenopathy, no JVD  CHEST/LUNG: CTAB; No wheezes, rales, or rhonchi  HEART: Regular rate and rhythm; No murmurs, rubs, or gallops  ABDOMEN: Soft, non-tender, non-distended; normal bowel sounds, no organomegaly  EXTREMITIES:  2+ peripheral pulses b/l, No clubbing, cyanosis, or edema  NEUROLOGY: A&O x 3, no focal deficits  SKIN: No rashes or lesions    LABS:                        10.6   9.24  )-----------( 240      ( 09 Dec 2021 07:40 )             32.8     12-09    141  |  107  |  7   ----------------------------<  91  4.1   |  24  |  0.79    Ca    9.0      09 Dec 2021 07:40  Phos  3.8     12-09  Mg     2.20     12-09                  RADIOLOGY & ADDITIONAL TESTS:  Results Reviewed:   Imaging Personally Reviewed:  Electrocardiogram Personally Reviewed:    COORDINATION OF CARE:  Care Discussed with Consultants/Other Providers [Y/N]:  Prior or Outpatient Records Reviewed [Y/N]:   PROGRESS NOTE:   Authored by Brody Goncalves MD  Pager: [facility] [phone]; [facility] [phone]    Patient is a 81y old  Female who presents with a chief complaint of Lethargy (09 Dec 2021 17:42)      SUBJECTIVE / OVERNIGHT EVENTS:  No acute events overnight. Patient stable and at baseline.  Fewer bowel movements reported overnight, formed stool.    ADDITIONAL REVIEW OF SYSTEMS:  Unable to assess given patient's mental status.    MEDICATIONS  (STANDING):  donepezil 10 milliGRAM(s) Oral at bedtime  fluPHENAZine 1 milliGRAM(s) Oral daily  influenza  Vaccine (HIGH DOSE) 0.7 milliLiter(s) IntraMuscular once  piperacillin/tazobactam IVPB.. 3.375 Gram(s) IV Intermittent every 8 hours  simvastatin 40 milliGRAM(s) Oral at bedtime    MEDICATIONS  (PRN):      CAPILLARY BLOOD GLUCOSE        I&O's Summary      PHYSICAL EXAM:  Vital Signs Last 24 Hrs  T(C): 36.6 (10 Dec 2021 05:22), Max: 36.6 (09 Dec 2021 12:18)  T(F): 97.8 (10 Dec 2021 05:22), Max: 97.8 (09 Dec 2021 12:18)  HR: 98 (10 Dec 2021 05:22) (89 - 98)  BP: 109/71 (10 Dec 2021 05:22) (109/71 - 142/88)  BP(mean): --  RR: 16 (10 Dec 2021 05:22) (16 - 17)  SpO2: 100% (10 Dec 2021 05:22) (100% - 100%)    GENERAL: No acute distress, elderly appearing, comfortable in bed  HEAD:  Atraumatic, Normocephalic  CHEST/LUNG: CTAB; No wheezes, rales, or rhonchi  HEART: Regular rate and rhythm; Soft systolic murmur noted  ABDOMEN: Soft, non-tender, non-distended; normal bowel sounds  EXTREMITIES:  2+ peripheral pulses b/l, No clubbing, cyanosis, or edema  NEUROLOGY: A&O x 1    LABS:                        10.6   9.24  )-----------( 240      ( 09 Dec 2021 07:40 )             32.8     12-09    141  |  107  |  7   ----------------------------<  91  4.1   |  24  |  0.79    Ca    9.0      09 Dec 2021 07:40  Phos  3.8     12-09  Mg     2.20     12-09

## 2021-12-10 NOTE — PROGRESS NOTE ADULT - ASSESSMENT
Patient is 80 yo F with hx of HTN, Alzheimer's dementia (AOx1 at baseline) who presented 12/6 with lethargy over the past week found to be septic with possible skin and soft tissue source     //Sepsis  With elevated lactate and TERENCE on admission. S/p clinda x1, vanc x1 and zosyn in ED.  vs skin/soft tissue/ joint source.    -UA grossly positive, unreliable historian in setting of advanced dementia  -R. hip subcutaneous edema with rim enhancement near greater trochanter suspicious for abscess vs bursitis  -s/p empiric vanco  -1/4 blood cx with CoNS indentified staph warneri, probable contaminant  -Urine cx with <10k CFU normal  guera  -improved day #5 zosyn, q8h  -Wound care eval, low concern for SSTI  -ortho eval, intact hardware  - deferring IR aspiration  - would transition to keflex 500 mg po q 8 h --> 12/19    Rachell Dunbar MD  Pager: [phone]  After 5 PM or weekends please call fellow on call or office [phone]

## 2021-12-10 NOTE — DISCHARGE NOTE PROVIDER - PROVIDER TOKENS
PROVIDER:[TOKEN:[[id number]],FOLLOWUP:[2 weeks],ESTABLISHEDPATIENT:[T]] PROVIDER:[TOKEN:[[id number]],FOLLOWUP:[2 weeks],ESTABLISHEDPATIENT:[T]],PROVIDER:[TOKEN:[[id number]],FOLLOWUP:[2 weeks]]

## 2021-12-10 NOTE — DISCHARGE NOTE PROVIDER - NSDCMRMEDTOKEN_GEN_ALL_CORE_FT
carvedilol 3.125 mg oral tablet: 1 tab(s) orally 2 times a day  cephalexin 500 mg oral capsule: 1 cap(s) orally 4 times a day  donepezil 10 mg oral tablet: 1 tab(s) orally once a day (at bedtime)  fluPHENAZine 1 mg oral tablet: 0.5 tab(s) orally once a day  simvastatin 40 mg oral tablet: 1 tab(s) orally once a day (at bedtime)   carvedilol 3.125 mg oral tablet: 1 tab(s) orally 2 times a day  cephalexin 500 mg oral capsule: 1 cap(s) orally 4 times a day  donepezil 10 mg oral tablet: 1 tab(s) orally once a day (at bedtime)  fluPHENAZine 1 mg oral tablet: 0.5 tab(s) orally once a day  heparin: 5000 unit(s) subcutaneous every 8 hours  nystatin 100,000 units/g topical powder: 1 application topically 2 times a day  simvastatin 40 mg oral tablet: 1 tab(s) orally once a day (at bedtime)

## 2021-12-10 NOTE — PROGRESS NOTE ADULT - PROBLEM SELECTOR PLAN 7
- Holding home amlodipine, carvedilol, ramipril i/s/o sepsis - Will restart home carvedilol on d/c to rehab  - Continue to hold ramipril & amlodipine until follow up with PMD

## 2021-12-10 NOTE — PROGRESS NOTE ADULT - PROBLEM SELECTOR PLAN 1
Patient admitted with lethargy, found to meet SIRS criteria by WBC, HR, temperature on admission  With new skin lesions on b/l hips and back, no other localizing symptoms  CRP elevated 124.2, lactate 2.4  12/6 CTAP revealed right hip soft tissue defect with subcutaneous infiltration and edema of the right hip and medial gluteal subcutaneous tissues. Rim-enhancing collection overlying the left greater trochanter with adjacent subcutaneous edema. Differential includes bursitis or infection.   UA with nitrite, LE, WBC, bacteria, urine culture with normal urogenital guera  S/p LR 75cc/hr x12 hours, lactate downtrended  Blood cultures with coag neg staph in 1/4 bottles, likely contaminate  MRSA swab negative  XR R femur/pelvis without any evidence of infection  - Zosyn (12/6-)  - Wound care MD team consulted- unlikely SSTI  - IR consulted per ortho recs for L trochanter collection- aspiration to be done tomorrow  - Ortho consulted, appreciate recs  - ID consulted, appreciate recs  - Trend WBC, fever curve  - Tylenol prn fever Patient admitted with lethargy, found to meet SIRS criteria by WBC, HR, temperature on admission  With new skin lesions on b/l hips and back, no other localizing symptoms  CRP elevated 124.2, lactate 2.4  12/6 CTAP revealed right hip soft tissue defect with subcutaneous infiltration and edema of the right hip and medial gluteal subcutaneous tissues. Rim-enhancing collection overlying the left greater trochanter with adjacent subcutaneous edema. Differential includes bursitis or infection.   UA with nitrite, LE, WBC, bacteria, urine culture with normal urogenital guera  S/p LR 75cc/hr x12 hours, lactate downtrended  Blood cultures with coag neg staph in 1/4 bottles, likely contaminate  MRSA swab negative  XR R femur/pelvis without any evidence of infection  - Zosyn (12/6-)  - Wound care MD team consulted  - Discussed radiological findings with radiology & IR- collection noted on L side very small (~4mm), would be very difficult to aspirate  - Discussed with ID- unlikely that hip rim-enhancing collection would be source of severe sepsis, more likely i/s/o SSTI or UTI, since treated  - Per ID- okay for d/c to rehab given clinical improvement on 10 more days of Keflex  - Ortho consulted, no intervention recommended  - ID consulted, appreciate recs  - Trend WBC, fever curve  - Tylenol prn fever

## 2021-12-10 NOTE — DISCHARGE NOTE PROVIDER - NSFOLLOWUPCLINICS_GEN_ALL_ED_FT
[facility]  Gynceology/OB  [address] [place] [address]  Phone: [phone]  Fax:   Follow Up Time: Routine

## 2021-12-10 NOTE — DISCHARGE NOTE PROVIDER - NSDCCPCAREPLAN_GEN_ALL_CORE_FT
PRINCIPAL DISCHARGE DIAGNOSIS  Diagnosis: Severe sepsis  Assessment and Plan of Treatment: You were admitted to the hospital for severe sepsis, which is an infection.      SECONDARY DISCHARGE DIAGNOSES  Diagnosis: Increased endometrial stripe thickness  Assessment and Plan of Treatment:     Diagnosis: Hypertension  Assessment and Plan of Treatment:      PRINCIPAL DISCHARGE DIAGNOSIS  Diagnosis: Severe sepsis  Assessment and Plan of Treatment: You were admitted to the hospital for severe sepsis, which means that you had an elevated white blood cell count, your blood pressure was low, and your heart rate was high. This is due to an infection. We believe this was likely due to a skin and soft tissue infection from your new wounds on your hips and lower back. You were seen by the wound care doctors who provided recommendations for how to take care of the wounds. In addition, you were treated with antibiotics. You will be discharged to rehab with an additional 10 days of an antibiotic called Keflex. You should complete this as prescribed. After discharge from rehab, you should follow up with your primary care doctor and wound care to discuss your hospitalization. If you have worsening fevers, fatigue, confusion, or faint. Please seek medical attention.      SECONDARY DISCHARGE DIAGNOSES  Diagnosis: Increased endometrial stripe thickness  Assessment and Plan of Treatment: On your abdominal imaging durnig your hospitalization, we noticed that you had thickening of the endometrium, the inner part of the uterus. This can be caused by issues with the uterus. You can follow up with the gynecologists outpatient for a further workup potentially including ultrasound and biopsy should this align with your wishes.    Diagnosis: Hypertension  Assessment and Plan of Treatment: You have a history of high blood pressure, also called hypertension. During your hospitalization, your high blood pressure medications were held due to your initial low blood pressure. On discharge to rehab, we will restart your carvedilol, but we will continue to hold your other medications. Please follow up with your primary care doctor to dsicuss restarting these other medications when you go home from the rehab.

## 2021-12-11 LAB
CULTURE RESULTS: SIGNIFICANT CHANGE UP
SPECIMEN SOURCE: SIGNIFICANT CHANGE UP

## 2021-12-11 PROCEDURE — 99232 SBSQ HOSP IP/OBS MODERATE 35: CPT | Mod: GC

## 2021-12-11 RX ORDER — NYSTATIN CREAM 100000 [USP'U]/G
1 CREAM TOPICAL
Refills: 0 | Status: DISCONTINUED | OUTPATIENT
Start: 2021-12-11 | End: 2021-12-13

## 2021-12-11 RX ADMIN — HEPARIN SODIUM 5000 UNIT(S): 5000 INJECTION INTRAVENOUS; SUBCUTANEOUS at 13:42

## 2021-12-11 RX ADMIN — HEPARIN SODIUM 5000 UNIT(S): 5000 INJECTION INTRAVENOUS; SUBCUTANEOUS at 05:37

## 2021-12-11 RX ADMIN — HEPARIN SODIUM 5000 UNIT(S): 5000 INJECTION INTRAVENOUS; SUBCUTANEOUS at 21:15

## 2021-12-11 RX ADMIN — SIMVASTATIN 40 MILLIGRAM(S): 20 TABLET, FILM COATED ORAL at 21:16

## 2021-12-11 RX ADMIN — FLUPHENAZINE HYDROCHLORIDE 1 MILLIGRAM(S): 1 TABLET, FILM COATED ORAL at 11:12

## 2021-12-11 RX ADMIN — Medication 500 MILLIGRAM(S): at 11:12

## 2021-12-11 RX ADMIN — DONEPEZIL HYDROCHLORIDE 10 MILLIGRAM(S): 10 TABLET, FILM COATED ORAL at 21:16

## 2021-12-11 RX ADMIN — Medication 500 MILLIGRAM(S): at 05:37

## 2021-12-11 RX ADMIN — Medication 500 MILLIGRAM(S): at 17:31

## 2021-12-11 NOTE — PROGRESS NOTE ADULT - PROBLEM SELECTOR PLAN 7
- Will restart home carvedilol on d/c to rehab  - Continue to hold ramipril & amlodipine until follow up with PMD

## 2021-12-11 NOTE — PROGRESS NOTE ADULT - SUBJECTIVE AND OBJECTIVE BOX
PROGRESS NOTE:   Authored by Brody Goncalves MD  Pager: [facility] [phone]; [facility] [phone]    Patient is a 81y old  Female who presents with a chief complaint of Lethargy (10 Dec 2021 16:29)      SUBJECTIVE / OVERNIGHT EVENTS:  No acute events overnight. Patient accepted to rehab pending bed availability.  Appears comfortable this morning. At baseline.    ADDITIONAL REVIEW OF SYSTEMS:  Unable to assess due to patient's baseline cognition.    MEDICATIONS  (STANDING):  cephalexin 500 milliGRAM(s) Oral four times a day  donepezil 10 milliGRAM(s) Oral at bedtime  fluPHENAZine 1 milliGRAM(s) Oral daily  heparin   Injectable 5000 Unit(s) SubCutaneous every 8 hours  influenza  Vaccine (HIGH DOSE) 0.7 milliLiter(s) IntraMuscular once  simvastatin 40 milliGRAM(s) Oral at bedtime    MEDICATIONS  (PRN):      CAPILLARY BLOOD GLUCOSE        I&O's Summary    10 Dec 2021 07:01  -  11 Dec 2021 07:00  --------------------------------------------------------  IN: 336 mL / OUT: 100 mL / NET: 236 mL        PHYSICAL EXAM:  Vital Signs Last 24 Hrs  T(C): 36.6 (11 Dec 2021 05:34), Max: 36.9 (10 Dec 2021 12:06)  T(F): 97.9 (11 Dec 2021 05:34), Max: 98.4 (10 Dec 2021 12:06)  HR: 92 (11 Dec 2021 05:34) (86 - 95)  BP: 153/90 (11 Dec 2021 05:34) (118/60 - 153/90)  BP(mean): --  RR: 18 (11 Dec 2021 05:34) (16 - 18)  SpO2: 100% (11 Dec 2021 05:34) (100% - 100%)    GENERAL: No acute distress, elderly appearing  HEAD:  Atraumatic, Normocephalic  CHEST/LUNG: CTAB; No wheezes, rales, or rhonchi  HEART: Regular rate and rhythm; Soft systolic murmur  ABDOMEN: Soft, non-tender, non-distended; normal bowel sounds  EXTREMITIES:  2+ peripheral pulses b/l, No clubbing, cyanosis, or edema  NEUROLOGY: A&O x 1, no focal deficits  SKIN: Wounds noted, unchanged    LABS:                        11.5   8.49  )-----------( 286      ( 10 Dec 2021 08:27 )             35.0     12-10    142  |  107  |  6<L>  ----------------------------<  88  3.6   |  20<L>  |  0.78    Ca    9.3      10 Dec 2021 08:27  Phos  3.4     12-10  Mg     2.20     12-10      PT/INR - ( 10 Dec 2021 10:47 )   PT: 12.2 sec;   INR: 1.07 ratio         PTT - ( 10 Dec 2021 10:47 )  PTT:27.0 sec            RADIOLOGY & ADDITIONAL TESTS:  None

## 2021-12-11 NOTE — PROGRESS NOTE ADULT - PROBLEM SELECTOR PLAN 5
CTAP noted fibroid uterus. Question endometrial thickening. Nonemergent pelvic ultrasound can be performed for further evaluation.  - Discussed results with patient's family  - If aligned with GOC, can proceed with outpatient evaluation

## 2021-12-11 NOTE — PROGRESS NOTE ADULT - PROBLEM SELECTOR PLAN 9
DVT ppx: SQH  Diet: Soft & bite sized, mildly thick liquids  Dispo: [facility]    FULL CODE  HCP: Daughter Alma Rosa [phone]    Patient is [employer] per HCP- should not receive blood products

## 2021-12-11 NOTE — PROGRESS NOTE ADULT - ASSESSMENT
Ms. 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 UTI, improved on Zosyn, transitioned to oral Keflex for d/c to rehab, pending bed availability.

## 2021-12-11 NOTE — PROGRESS NOTE ADULT - PROBLEM SELECTOR PLAN 1
Patient admitted with lethargy, found to meet SIRS criteria by WBC, HR, temperature on admission  With new skin lesions on b/l hips and back, no other localizing symptoms  CRP elevated 124.2, lactate 2.4  12/6 CTAP revealed right hip soft tissue defect with subcutaneous infiltration and edema of the right hip and medial gluteal subcutaneous tissues. Rim-enhancing collection overlying the left greater trochanter with adjacent subcutaneous edema. Differential includes bursitis or infection.   UA with nitrite, LE, WBC, bacteria, urine culture with normal urogenital guera  S/p LR 75cc/hr x12 hours, lactate downtrended  Blood cultures with coag neg staph in 1/4 bottles, likely contaminate  MRSA swab negative  XR R femur/pelvis without any evidence of infection  - Zosyn (12/6-)  - Wound care MD team consulted  - Discussed radiological findings with radiology & IR- collection noted on L side very small (~4mm), would be very difficult to aspirate  - Discussed with ID- unlikely that hip rim-enhancing collection would be source of severe sepsis, more likely i/s/o SSTI or UTI, since treated  - Per ID- okay for d/c to rehab given clinical improvement on 10 more days of Keflex  - Ortho consulted, no intervention recommended  - ID consulted, appreciate recs  - Trend WBC, fever curve  - Tylenol prn fever

## 2021-12-11 NOTE — PROGRESS NOTE ADULT - PROBLEM SELECTOR PLAN 2
Patient with UA significant for nitrite, LE, WBC, bacteria  Urine culture with normal urogenital guera  Likely not true urinary tract infection  - Zosyn (12/6-12/10), Keflex x10d (12/10-)

## 2021-12-11 NOTE — PROGRESS NOTE ADULT - ATTENDING COMMENTS
81F Jew, with PMH of HTN, HLD, Alzheimer's dementia, stage 1 sacral ulcers who presents from home w/ increased fatigue and new onset of sores/blisters on her back. Imaging showed right hip soft tissue defect w/ edema adjacent to hip hardware. Admitted for further infectious workup. ID/ortho on board. Findings concerning for abscess vs. bursitis. Blood cx likely showing contaminant w/ coag neg staph. Pt maintained on Zosyn. Imaging abnormalities that were initially noted was reviewed w/ radiology again --> collection is rather small and its clinical significance is unclear. Given dramatic clinical improvement, will not further pursue drainage.    -DC Zosyn, switch to Keflex per ID recs   -Ortho/ID recs appreciated.   -Clinically stable for DC to [facility] once arranged.     Discussed w/ HS Dr. Goncavles

## 2021-12-12 LAB — SARS-COV-2 RNA SPEC QL NAA+PROBE: SIGNIFICANT CHANGE UP

## 2021-12-12 PROCEDURE — 99232 SBSQ HOSP IP/OBS MODERATE 35: CPT | Mod: GC

## 2021-12-12 RX ADMIN — Medication 500 MILLIGRAM(S): at 11:02

## 2021-12-12 RX ADMIN — Medication 500 MILLIGRAM(S): at 05:22

## 2021-12-12 RX ADMIN — HEPARIN SODIUM 5000 UNIT(S): 5000 INJECTION INTRAVENOUS; SUBCUTANEOUS at 05:22

## 2021-12-12 RX ADMIN — NYSTATIN CREAM 1 APPLICATION(S): 100000 CREAM TOPICAL at 17:09

## 2021-12-12 RX ADMIN — Medication 500 MILLIGRAM(S): at 17:09

## 2021-12-12 RX ADMIN — DONEPEZIL HYDROCHLORIDE 10 MILLIGRAM(S): 10 TABLET, FILM COATED ORAL at 22:35

## 2021-12-12 RX ADMIN — HEPARIN SODIUM 5000 UNIT(S): 5000 INJECTION INTRAVENOUS; SUBCUTANEOUS at 22:42

## 2021-12-12 RX ADMIN — Medication 500 MILLIGRAM(S): at 00:24

## 2021-12-12 RX ADMIN — NYSTATIN CREAM 1 APPLICATION(S): 100000 CREAM TOPICAL at 05:23

## 2021-12-12 RX ADMIN — SIMVASTATIN 40 MILLIGRAM(S): 20 TABLET, FILM COATED ORAL at 22:35

## 2021-12-12 RX ADMIN — HEPARIN SODIUM 5000 UNIT(S): 5000 INJECTION INTRAVENOUS; SUBCUTANEOUS at 13:40

## 2021-12-12 RX ADMIN — FLUPHENAZINE HYDROCHLORIDE 1 MILLIGRAM(S): 1 TABLET, FILM COATED ORAL at 11:03

## 2021-12-12 NOTE — PROGRESS NOTE ADULT - PROBLEM SELECTOR PLAN 9
DVT ppx: SQH  Diet: Soft & bite sized, mildly thick liquids  Dispo: [facility]    FULL CODE  HCP: Daughter Alma Rosa [phone]    Patient is [employer] per HCP- should not receive blood products

## 2021-12-12 NOTE — PROGRESS NOTE ADULT - PROBLEM SELECTOR PLAN 1
Patient admitted with lethargy, found to meet SIRS criteria by WBC, HR, temperature on admission  With new skin lesions on b/l hips and back, no other localizing symptoms  CRP elevated 124.2, lactate 2.4  12/6 CTAP revealed right hip soft tissue defect with subcutaneous infiltration and edema of the right hip and medial gluteal subcutaneous tissues. Rim-enhancing collection overlying the left greater trochanter with adjacent subcutaneous edema. Differential includes bursitis or infection.   UA with nitrite, LE, WBC, bacteria, urine culture with normal urogenital gurea  S/p LR 75cc/hr x12 hours, lactate downtrended  Blood cultures with coag neg staph in 1/4 bottles, likely contaminate  MRSA swab negative  XR R femur/pelvis without any evidence of infection  - Zosyn (12/6-)  - Wound care MD team consulted  - Discussed radiological findings with radiology & IR- collection noted on L side very small (~4mm), would be very difficult to aspirate  - Discussed with ID- unlikely that hip rim-enhancing collection would be source of severe sepsis, more likely i/s/o SSTI or UTI, since treated  - Per ID- okay for d/c to rehab given clinical improvement on 10 more days of Keflex  - Ortho consulted, no intervention recommended  - ID consulted, appreciate recs  - Trend WBC, fever curve  - Tylenol prn fever

## 2021-12-12 NOTE — PROGRESS NOTE ADULT - ATTENDING COMMENTS
81F Mandaen, with PMH of HTN, HLD, Alzheimer's dementia, stage 1 sacral ulcers who presents from home w/ increased fatigue and new onset of sores/blisters on her back. Imaging showed right hip soft tissue defect w/ edema adjacent to hip hardware. Admitted for further infectious workup. ID/ortho on board. Findings concerning for abscess vs. bursitis. Blood cx likely showing contaminant w/ coag neg staph. Pt maintained on Zosyn. Imaging abnormalities that were initially noted was reviewed w/ radiology again --> collection is rather small and its clinical significance is unclear. Given dramatic clinical improvement, will not further pursue drainage.    -DC Zosyn, switch to Keflex per ID recs   -Ortho/ID recs appreciated.   -Clinically stable for DC to [facility] once arranged. COVID negative 12/12     Discussed w/ HS Dr. Quintana

## 2021-12-12 NOTE — PROGRESS NOTE ADULT - SUBJECTIVE AND OBJECTIVE BOX
INCOMPLETE NOTE PROGRESS NOTE:     Patient is a 81y old  Female who presents with a chief complaint of Lethargy (12 Dec 2021 05:52)      SUBJECTIVE / OVERNIGHT EVENTS:  ALEX  not participatory in full ros  No fevers or cp    MEDICATIONS  (STANDING):  cephalexin 500 milliGRAM(s) Oral four times a day  donepezil 10 milliGRAM(s) Oral at bedtime  fluPHENAZine 1 milliGRAM(s) Oral daily  heparin   Injectable 5000 Unit(s) SubCutaneous every 8 hours  influenza  Vaccine (HIGH DOSE) 0.7 milliLiter(s) IntraMuscular once  nystatin Powder 1 Application(s) Topical two times a day  simvastatin 40 milliGRAM(s) Oral at bedtime    MEDICATIONS  (PRN):      CAPILLARY BLOOD GLUCOSE        I&O's Summary    11 Dec 2021 07:01  -  12 Dec 2021 07:00  --------------------------------------------------------  IN: 368 mL / OUT: 0 mL / NET: 368 mL        PHYSICAL EXAM:  Vital Signs Last 24 Hrs  T(C): 36.9 (12 Dec 2021 12:32), Max: 36.9 (11 Dec 2021 21:14)  T(F): 98.5 (12 Dec 2021 12:32), Max: 98.5 (11 Dec 2021 21:14)  HR: 95 (12 Dec 2021 12:32) (89 - 96)  BP: 114/60 (12 Dec 2021 12:32) (114/60 - 127/73)  BP(mean): --  RR: 18 (12 Dec 2021 12:32) (18 - 18)  SpO2: 100% (12 Dec 2021 12:32) (100% - 100%)    CONSTITUTIONAL: NAD, well-developed. Lying in bed contracted.  CARDIOVASCULAR: Regular rate and rhythm. Normal S1 and S2. No murmurs.  RESPIRATORY: Normal respiratory effort, Lungs CTAB  EXTREMITIES: No ANDRA, peripheral pulses intact.  INTEGUMENT: Bilateral hip wound dressings CDI not tender to gentle palpation  ABDOMEN: Nontender to palpation, normoactive bowel sounds, no rebound/guarding; No hepatosplenomegaly  MUSCLOSKELETAL: no clubbing or cyanosis of digits; no joint swelling or tenderness to palpation  PSYCH: A+O to person, place, and time; affect appropriate    LABS:                      RADIOLOGY & ADDITIONAL TESTS:  Results Reviewed:   Imaging Personally Reviewed:  Electrocardiogram Personally Reviewed:    COORDINATION OF CARE:  Care Discussed with Consultants/Other Providers [Y/N]:  Prior or Outpatient Records Reviewed [Y/N]:

## 2021-12-12 NOTE — PROGRESS NOTE ADULT - PROBLEM SELECTOR PLAN 2
Patient with UA significant for nitrite, LE, WBC, bacteria  Urine culture with normal urogenital ugera  Likely not true urinary tract infection  - Zosyn (12/6-12/10), Keflex x10d (12/10-)

## 2021-12-13 ENCOUNTER — TRANSCRIPTION ENCOUNTER (OUTPATIENT)
Age: 81
End: 2021-12-13

## 2021-12-13 VITALS
TEMPERATURE: 98 F | RESPIRATION RATE: 18 BRPM | SYSTOLIC BLOOD PRESSURE: 124 MMHG | HEART RATE: 68 BPM | OXYGEN SATURATION: 97 % | DIASTOLIC BLOOD PRESSURE: 64 MMHG

## 2021-12-13 PROCEDURE — 99239 HOSP IP/OBS DSCHRG MGMT >30: CPT

## 2021-12-13 RX ORDER — NYSTATIN CREAM 100000 [USP'U]/G
1 CREAM TOPICAL
Qty: 0 | Refills: 0 | DISCHARGE
Start: 2021-12-13

## 2021-12-13 RX ORDER — HEPARIN SODIUM 5000 [USP'U]/ML
5000 INJECTION INTRAVENOUS; SUBCUTANEOUS
Qty: 0 | Refills: 0 | DISCHARGE
Start: 2021-12-13

## 2021-12-13 RX ADMIN — NYSTATIN CREAM 1 APPLICATION(S): 100000 CREAM TOPICAL at 05:49

## 2021-12-13 RX ADMIN — HEPARIN SODIUM 5000 UNIT(S): 5000 INJECTION INTRAVENOUS; SUBCUTANEOUS at 13:14

## 2021-12-13 RX ADMIN — Medication 500 MILLIGRAM(S): at 05:49

## 2021-12-13 RX ADMIN — FLUPHENAZINE HYDROCHLORIDE 1 MILLIGRAM(S): 1 TABLET, FILM COATED ORAL at 13:13

## 2021-12-13 RX ADMIN — Medication 500 MILLIGRAM(S): at 00:14

## 2021-12-13 RX ADMIN — HEPARIN SODIUM 5000 UNIT(S): 5000 INJECTION INTRAVENOUS; SUBCUTANEOUS at 05:48

## 2021-12-13 RX ADMIN — Medication 500 MILLIGRAM(S): at 11:36

## 2021-12-13 NOTE — PROGRESS NOTE ADULT - ATTENDING COMMENTS
81F [employer], with PMH of HTN, HLD, Alzheimer's dementia, stage 1 sacral ulcers who presents from home w/ increased fatigue and new onset of sores/blisters on her back. Imaging showed right hip soft tissue defect w/ edema adjacent to hip hardware. Admitted for further infectious workup. ID/ortho on board. Findings concerning for abscess vs. bursitis. Blood cx likely showing contaminant w/ coag neg staph. Pt maintained on Zosyn. Imaging abnormalities that were initially noted was reviewed w/ radiology again --> collection is rather small and its clinical significance is unclear. Given dramatic clinical improvement, will not further pursue drainage.    -Cont Keflex per ID.  -Dispo to [facility] for further rehabilitation. Stable for DC. 35 minutes spent preparing DC, counseling pt/fam, and coordination.

## 2021-12-13 NOTE — DISCHARGE NOTE NURSING/CASE MANAGEMENT/SOCIAL WORK - PATIENT PORTAL LINK FT
You can access the FollowMyHealth Patient Portal offered by [facility] by registering at the following website: [email or web address]. By joining [facility]’s FollowMyHealth portal, you will also be able to view your health information using other applications (apps) compatible with our system.

## 2021-12-13 NOTE — PROGRESS NOTE ADULT - PROBLEM SELECTOR PLAN 9
DVT ppx: SQH  Diet: Soft & bite sized, mildly thick liquids  Dispo: [facility]    FULL CODE  HCP: Daughter Alma Rosa [phone]    Patient is Adventism per HCP- should not receive blood products

## 2021-12-13 NOTE — DISCHARGE NOTE NURSING/CASE MANAGEMENT/SOCIAL WORK - NSDCPEFALRISK_GEN_ALL_CORE
For information on Fall & Injury Prevention, visit: [email or web address] OR  [email or web address] OR  https://www.cdc.gov/steadi/patient.html

## 2021-12-13 NOTE — PROGRESS NOTE ADULT - PROBLEM SELECTOR PLAN 1
Patient admitted with lethargy, found to meet SIRS criteria by WBC, HR, temperature on admission  With new skin lesions on b/l hips and back, no other localizing symptoms. Likely all 2/2 skin/soft tissue infx  CRP elevated 124.2, lactate 2.4  12/6 CTAP revealed right hip soft tissue defect with subcutaneous infiltration and edema of the right hip and medial gluteal subcutaneous tissues. Rim-enhancing collection overlying the left greater trochanter with adjacent subcutaneous edema. Differential includes bursitis or infection.   XR R femur/pelvis without any evidence of infection  - Zosyn (12/6-10)  - Switched to PO Keflex 12/10 x 10 days through 12/20, to be completed at [facility]  - Wound care MD team consulted  - Discussed radiological findings with radiology & IR- collection noted on L side very small (~4mm), would be very difficult to aspirate and not likely source  - Discussed with ID- unlikely that hip rim-enhancing collection would be source of severe sepsis, more likely i/s/o SSTI or UTI, since treated  - Per ID- okay for d/c to rehab on Keflex through 12/20  - Ortho consulted, no intervention recommended  - ID consulted, appreciate recs

## 2021-12-13 NOTE — PROGRESS NOTE ADULT - PROBLEM SELECTOR PROBLEM 3
TERENCE (acute kidney injury)
ETRENCE (acute kidney injury)
TERENCE (acute kidney injury)

## 2021-12-13 NOTE — PROGRESS NOTE ADULT - PROVIDER SPECIALTY LIST ADULT
Internal Medicine
Infectious Disease
Infectious Disease
Internal Medicine
Orthopedics
Infectious Disease
Internal Medicine

## 2021-12-13 NOTE — PROGRESS NOTE ADULT - SUBJECTIVE AND OBJECTIVE BOX
***INCOMPLETE NOTE***  Jn Guzman MD PGY-3  Internal Medicine  Pager [phone] / [phone]  After 7pm please page Night Float [phone]    Patient is a 81y old  Female who presents with a chief complaint of Lethargy (12 Dec 2021 05:52)      SUBJECTIVE / OVERNIGHT EVENTS:    MEDICATIONS  (STANDING):  cephalexin 500 milliGRAM(s) Oral four times a day  donepezil 10 milliGRAM(s) Oral at bedtime  fluPHENAZine 1 milliGRAM(s) Oral daily  heparin   Injectable 5000 Unit(s) SubCutaneous every 8 hours  influenza  Vaccine (HIGH DOSE) 0.7 milliLiter(s) IntraMuscular once  nystatin Powder 1 Application(s) Topical two times a day  simvastatin 40 milliGRAM(s) Oral at bedtime    MEDICATIONS  (PRN):      CAPILLARY BLOOD GLUCOSE        I&O's Summary    12 Dec 2021 07:01  -  13 Dec 2021 07:00  --------------------------------------------------------  IN: 1124 mL / OUT: 0 mL / NET: 1124 mL        PHYSICAL EXAM:  Vital Signs Last 24 Hrs  T(C): 36.7 (12-13-21 @ 05:07)  T(F): 98 (12-13-21 @ 05:07), Max: 98.5 (12-12-21 @ 12:32)  HR: 95 (12-13-21 @ 05:07) (64 - 95)  BP: 112/80 (12-13-21 @ 05:07)  BP(mean): --  RR: 18 (12-13-21 @ 05:07) (18 - 18)  SpO2: 97% (12-13-21 @ 05:07) (95% - 100%)  Wt(kg): --    12-12 @ 07:01  -  12-13 @ 07:00  --------------------------------------------------------  IN: 1124 mL / OUT: 0 mL / NET: 1124 mL      Constitutional: NAD, awake and alert  EYES: EOMI  ENT:  Normal Hearing, no tonsillar exudates   Neck: Soft and supple , no thyromegaly   Respiratory: Breath sounds are clear bilaterally, No wheezing, rales or rhonchi  Cardiovascular: S1 and S2, regular rate and rhythm, no Murmurs, gallops or rubs, no JVD,    Gastrointestinal: Bowel Sounds present, soft, nontender, nondistended, no guarding, no rebound  Extremities: No cyanosis or clubbing; warm to touch  Vascular: 2+ peripheral pulses lower ex  Neurological: No focal deficits, CN II-XII intact bilaterally, sensation to light touch intact in all extremities, gait intact. Pupils are equally reactive to light and symmetrical in size.   Musculoskeletal: 5/5 strength b/l upper and lower extremities; no joint swelling.  Skin: No rashes  Psych: no depression or anhedonia, AAOx3  HEME: no bruises, no nose bleeds      LABS:                    RADIOLOGY & ADDITIONAL TESTS:    Imaging Personally Reviewed:    Consultant(s) Notes Reviewed:      Care Discussed with Consultants/Other Providers:   Jn Guzman MD PGY-3  Internal Medicine  Pager [phone] / [phone]  After 7pm please page Night Float [phone]    Patient is a 81y old  Female who presents with a chief complaint of Lethargy (12 Dec 2021 05:52)    SUBJECTIVE / OVERNIGHT EVENTS:  No acute events overnight.   A&O x1, not endorsing any complaints when asked review of systems. No fevers, chest pain, shortness of breath, abdominal pain.     MEDICATIONS  (STANDING):  cephalexin 500 milliGRAM(s) Oral four times a day  donepezil 10 milliGRAM(s) Oral at bedtime  fluPHENAZine 1 milliGRAM(s) Oral daily  heparin   Injectable 5000 Unit(s) SubCutaneous every 8 hours  influenza  Vaccine (HIGH DOSE) 0.7 milliLiter(s) IntraMuscular once  nystatin Powder 1 Application(s) Topical two times a day  simvastatin 40 milliGRAM(s) Oral at bedtime    MEDICATIONS  (PRN):      CAPILLARY BLOOD GLUCOSE        I&O's Summary    12 Dec 2021 07:01  -  13 Dec 2021 07:00  --------------------------------------------------------  IN: 1124 mL / OUT: 0 mL / NET: 1124 mL        PHYSICAL EXAM:  Vital Signs Last 24 Hrs  T(C): 36.7 (12-13-21 @ 05:07)  T(F): 98 (12-13-21 @ 05:07), Max: 98.5 (12-12-21 @ 12:32)  HR: 95 (12-13-21 @ 05:07) (64 - 95)  BP: 112/80 (12-13-21 @ 05:07)  BP(mean): --  RR: 18 (12-13-21 @ 05:07) (18 - 18)  SpO2: 97% (12-13-21 @ 05:07) (95% - 100%)  Wt(kg): --    12-12 @ 07:01  -  12-13 @ 07:00  --------------------------------------------------------  IN: 1124 mL / OUT: 0 mL / NET: 1124 mL    GENERAL: No acute distress, elderly appearing  HEAD:  Atraumatic, Normocephalic  CHEST/LUNG: CTAB; No wheezes, rales, or rhonchi  HEART: Regular rate and rhythm; 3/6 systolic murmur  ABDOMEN: Soft, non-tender, non-distended; normal bowel sounds  EXTREMITIES:  2+ peripheral pulses b/l, No clubbing, cyanosis, or edema  NEUROLOGY: A&O x 1, no focal deficits  SKIN: Wounds noted, unchanged      LABS:  COVID negative    RADIOLOGY & ADDITIONAL TESTS:    Imaging Personally Reviewed:    Consultant(s) Notes Reviewed:      Care Discussed with Consultants/Other Providers:

## 2022-03-27 ENCOUNTER — INPATIENT (INPATIENT)
Facility: HOSPITAL | Age: 82
LOS: 10 days | Discharge: HOME HEALTH | End: 2022-04-07
Attending: INTERNAL MEDICINE | Admitting: INTERNAL MEDICINE
Payer: MEDICARE

## 2022-03-27 VITALS
HEIGHT: 62 IN | TEMPERATURE: 98 F | SYSTOLIC BLOOD PRESSURE: 138 MMHG | OXYGEN SATURATION: 100 % | HEART RATE: 85 BPM | DIASTOLIC BLOOD PRESSURE: 93 MMHG | RESPIRATION RATE: 18 BRPM

## 2022-03-27 DIAGNOSIS — Z98.890 OTHER SPECIFIED POSTPROCEDURAL STATES: Chronic | ICD-10-CM

## 2022-03-27 DIAGNOSIS — E87.0 HYPEROSMOLALITY AND HYPERNATREMIA: ICD-10-CM

## 2022-03-27 DIAGNOSIS — F03.90 UNSPECIFIED DEMENTIA WITHOUT BEHAVIORAL DISTURBANCE: ICD-10-CM

## 2022-03-27 DIAGNOSIS — R23.8 OTHER SKIN CHANGES: ICD-10-CM

## 2022-03-27 DIAGNOSIS — Z86.73 PERSONAL HISTORY OF TRANSIENT ISCHEMIC ATTACK (TIA), AND CEREBRAL INFARCTION WITHOUT RESIDUAL DEFICITS: ICD-10-CM

## 2022-03-27 DIAGNOSIS — Z29.9 ENCOUNTER FOR PROPHYLACTIC MEASURES, UNSPECIFIED: ICD-10-CM

## 2022-03-27 DIAGNOSIS — T14.8XXA OTHER INJURY OF UNSPECIFIED BODY REGION, INITIAL ENCOUNTER: ICD-10-CM

## 2022-03-27 DIAGNOSIS — L02.419 CUTANEOUS ABSCESS OF LIMB, UNSPECIFIED: ICD-10-CM

## 2022-03-27 LAB
ALBUMIN SERPL ELPH-MCNC: 3.4 G/DL — SIGNIFICANT CHANGE UP (ref 3.3–5)
ALP SERPL-CCNC: 101 U/L — SIGNIFICANT CHANGE UP (ref 40–120)
ALT FLD-CCNC: 30 U/L — SIGNIFICANT CHANGE UP (ref 4–33)
ANION GAP SERPL CALC-SCNC: 12 MMOL/L — SIGNIFICANT CHANGE UP (ref 7–14)
AST SERPL-CCNC: 31 U/L — SIGNIFICANT CHANGE UP (ref 4–32)
BASE EXCESS BLDV CALC-SCNC: 3.1 MMOL/L — HIGH (ref -2–3)
BASOPHILS # BLD AUTO: 0.03 K/UL — SIGNIFICANT CHANGE UP (ref 0–0.2)
BASOPHILS NFR BLD AUTO: 0.2 % — SIGNIFICANT CHANGE UP (ref 0–2)
BILIRUB SERPL-MCNC: 0.3 MG/DL — SIGNIFICANT CHANGE UP (ref 0.2–1.2)
BLOOD GAS VENOUS COMPREHENSIVE RESULT: SIGNIFICANT CHANGE UP
BUN SERPL-MCNC: 15 MG/DL — SIGNIFICANT CHANGE UP (ref 7–23)
CALCIUM SERPL-MCNC: 9.1 MG/DL — SIGNIFICANT CHANGE UP (ref 8.4–10.5)
CHLORIDE BLDV-SCNC: 111 MMOL/L — HIGH (ref 96–108)
CHLORIDE SERPL-SCNC: 111 MMOL/L — HIGH (ref 98–107)
CO2 BLDV-SCNC: 31.5 MMOL/L — HIGH (ref 22–26)
CO2 SERPL-SCNC: 26 MMOL/L — SIGNIFICANT CHANGE UP (ref 22–31)
CREAT SERPL-MCNC: 0.69 MG/DL — SIGNIFICANT CHANGE UP (ref 0.5–1.3)
EGFR: 87 ML/MIN/1.73M2 — SIGNIFICANT CHANGE UP
EOSINOPHIL # BLD AUTO: 0.19 K/UL — SIGNIFICANT CHANGE UP (ref 0–0.5)
EOSINOPHIL NFR BLD AUTO: 1.5 % — SIGNIFICANT CHANGE UP (ref 0–6)
FLUAV AG NPH QL: SIGNIFICANT CHANGE UP
FLUBV AG NPH QL: SIGNIFICANT CHANGE UP
GAS PNL BLDV: 144 MMOL/L — SIGNIFICANT CHANGE UP (ref 136–145)
GLUCOSE BLDV-MCNC: 89 MG/DL — SIGNIFICANT CHANGE UP (ref 70–99)
GLUCOSE SERPL-MCNC: 101 MG/DL — HIGH (ref 70–99)
HCO3 BLDV-SCNC: 30 MMOL/L — HIGH (ref 22–29)
HCT VFR BLD CALC: 35.4 % — SIGNIFICANT CHANGE UP (ref 34.5–45)
HCT VFR BLDA CALC: 36 % — SIGNIFICANT CHANGE UP (ref 34.5–46.5)
HGB BLD CALC-MCNC: 12 G/DL — SIGNIFICANT CHANGE UP (ref 11.5–15.5)
HGB BLD-MCNC: 11.9 G/DL — SIGNIFICANT CHANGE UP (ref 11.5–15.5)
IANC: 8.95 K/UL — HIGH (ref 1.8–7.4)
IMM GRANULOCYTES NFR BLD AUTO: 0.4 % — SIGNIFICANT CHANGE UP (ref 0–1.5)
LACTATE BLDV-MCNC: 1.6 MMOL/L — SIGNIFICANT CHANGE UP (ref 0.5–2)
LYMPHOCYTES # BLD AUTO: 18.9 % — SIGNIFICANT CHANGE UP (ref 13–44)
LYMPHOCYTES # BLD AUTO: 2.38 K/UL — SIGNIFICANT CHANGE UP (ref 1–3.3)
MAGNESIUM SERPL-MCNC: 2.3 MG/DL — SIGNIFICANT CHANGE UP (ref 1.6–2.6)
MCHC RBC-ENTMCNC: 29.4 PG — SIGNIFICANT CHANGE UP (ref 27–34)
MCHC RBC-ENTMCNC: 33.6 GM/DL — SIGNIFICANT CHANGE UP (ref 32–36)
MCV RBC AUTO: 87.4 FL — SIGNIFICANT CHANGE UP (ref 80–100)
MONOCYTES # BLD AUTO: 0.98 K/UL — HIGH (ref 0–0.9)
MONOCYTES NFR BLD AUTO: 7.8 % — SIGNIFICANT CHANGE UP (ref 2–14)
NEUTROPHILS # BLD AUTO: 8.95 K/UL — HIGH (ref 1.8–7.4)
NEUTROPHILS NFR BLD AUTO: 71.2 % — SIGNIFICANT CHANGE UP (ref 43–77)
NRBC # BLD: 0 /100 WBCS — SIGNIFICANT CHANGE UP
NRBC # FLD: 0 K/UL — SIGNIFICANT CHANGE UP
PCO2 BLDV: 54 MMHG — HIGH (ref 39–42)
PH BLDV: 7.35 — SIGNIFICANT CHANGE UP (ref 7.32–7.43)
PLATELET # BLD AUTO: 190 K/UL — SIGNIFICANT CHANGE UP (ref 150–400)
PO2 BLDV: 28 MMHG — SIGNIFICANT CHANGE UP
POTASSIUM BLDV-SCNC: 3.8 MMOL/L — SIGNIFICANT CHANGE UP (ref 3.5–5.1)
POTASSIUM SERPL-MCNC: 4.4 MMOL/L — SIGNIFICANT CHANGE UP (ref 3.5–5.3)
POTASSIUM SERPL-SCNC: 4.4 MMOL/L — SIGNIFICANT CHANGE UP (ref 3.5–5.3)
PROT SERPL-MCNC: 6.8 G/DL — SIGNIFICANT CHANGE UP (ref 6–8.3)
RBC # BLD: 4.05 M/UL — SIGNIFICANT CHANGE UP (ref 3.8–5.2)
RBC # FLD: 15.6 % — HIGH (ref 10.3–14.5)
RSV RNA NPH QL NAA+NON-PROBE: SIGNIFICANT CHANGE UP
SAO2 % BLDV: 32.5 % — SIGNIFICANT CHANGE UP
SARS-COV-2 RNA SPEC QL NAA+PROBE: SIGNIFICANT CHANGE UP
SODIUM SERPL-SCNC: 149 MMOL/L — HIGH (ref 135–145)
WBC # BLD: 12.58 K/UL — HIGH (ref 3.8–10.5)
WBC # FLD AUTO: 12.58 K/UL — HIGH (ref 3.8–10.5)

## 2022-03-27 PROCEDURE — 73630 X-RAY EXAM OF FOOT: CPT | Mod: 26,LT

## 2022-03-27 PROCEDURE — 99222 1ST HOSP IP/OBS MODERATE 55: CPT

## 2022-03-27 PROCEDURE — 71045 X-RAY EXAM CHEST 1 VIEW: CPT | Mod: 26

## 2022-03-27 PROCEDURE — 99223 1ST HOSP IP/OBS HIGH 75: CPT | Mod: GC

## 2022-03-27 PROCEDURE — 70450 CT HEAD/BRAIN W/O DYE: CPT | Mod: 26,MD

## 2022-03-27 PROCEDURE — 72193 CT PELVIS W/DYE: CPT | Mod: 26,MD

## 2022-03-27 PROCEDURE — 99285 EMERGENCY DEPT VISIT HI MDM: CPT | Mod: GC

## 2022-03-27 RX ORDER — ENOXAPARIN SODIUM 100 MG/ML
40 INJECTION SUBCUTANEOUS EVERY 24 HOURS
Refills: 0 | Status: DISCONTINUED | OUTPATIENT
Start: 2022-03-27 | End: 2022-03-28

## 2022-03-27 RX ORDER — PIPERACILLIN AND TAZOBACTAM 4; .5 G/20ML; G/20ML
3.38 INJECTION, POWDER, LYOPHILIZED, FOR SOLUTION INTRAVENOUS EVERY 8 HOURS
Refills: 0 | Status: DISCONTINUED | OUTPATIENT
Start: 2022-03-28 | End: 2022-03-31

## 2022-03-27 RX ORDER — PIPERACILLIN AND TAZOBACTAM 4; .5 G/20ML; G/20ML
3.38 INJECTION, POWDER, LYOPHILIZED, FOR SOLUTION INTRAVENOUS ONCE
Refills: 0 | Status: COMPLETED | OUTPATIENT
Start: 2022-03-27 | End: 2022-03-27

## 2022-03-27 RX ORDER — SODIUM CHLORIDE 9 MG/ML
1000 INJECTION, SOLUTION INTRAVENOUS
Refills: 0 | Status: DISCONTINUED | OUTPATIENT
Start: 2022-03-27 | End: 2022-03-29

## 2022-03-27 RX ORDER — INFLUENZA VIRUS VACCINE 15; 15; 15; 15 UG/.5ML; UG/.5ML; UG/.5ML; UG/.5ML
0.7 SUSPENSION INTRAMUSCULAR ONCE
Refills: 0 | Status: DISCONTINUED | OUTPATIENT
Start: 2022-03-27 | End: 2022-04-07

## 2022-03-27 RX ORDER — ACETAMINOPHEN 500 MG
650 TABLET ORAL EVERY 6 HOURS
Refills: 0 | Status: DISCONTINUED | OUTPATIENT
Start: 2022-03-27 | End: 2022-04-07

## 2022-03-27 RX ORDER — VANCOMYCIN HCL 1 G
1000 VIAL (EA) INTRAVENOUS ONCE
Refills: 0 | Status: COMPLETED | OUTPATIENT
Start: 2022-03-27 | End: 2022-03-27

## 2022-03-27 RX ADMIN — SODIUM CHLORIDE 75 MILLILITER(S): 9 INJECTION, SOLUTION INTRAVENOUS at 19:37

## 2022-03-27 RX ADMIN — Medication 1000 MILLIGRAM(S): at 19:55

## 2022-03-27 RX ADMIN — PIPERACILLIN AND TAZOBACTAM 3.38 GRAM(S): 4; .5 INJECTION, POWDER, LYOPHILIZED, FOR SOLUTION INTRAVENOUS at 19:55

## 2022-03-27 RX ADMIN — Medication 250 MILLIGRAM(S): at 17:30

## 2022-03-27 RX ADMIN — PIPERACILLIN AND TAZOBACTAM 200 GRAM(S): 4; .5 INJECTION, POWDER, LYOPHILIZED, FOR SOLUTION INTRAVENOUS at 16:16

## 2022-03-27 NOTE — H&P ADULT - PROBLEM SELECTOR PLAN 4
baseline dementia, AOx1 - patient has been at baseline mental status per family     - Will continue donepezil 10mg daily Sodium currently 149 most likely 2/2 hypovolemia   - will trend NA after fluid (NS 75 mL/ hr)

## 2022-03-27 NOTE — H&P ADULT - NSHPPHYSICALEXAM_GEN_ALL_CORE
Vital Signs Last 24 Hrs  T(C): 36.6 (27 Mar 2022 20:00), Max: 36.7 (27 Mar 2022 13:05)  T(F): 97.8 (27 Mar 2022 20:00), Max: 98 (27 Mar 2022 13:05)  HR: 92 (27 Mar 2022 20:00) (85 - 92)  BP: 176/92 (27 Mar 2022 20:00) (138/93 - 176/92)  BP(mean): --  RR: 18 (27 Mar 2022 20:00) (18 - 18)  SpO2: 100% (27 Mar 2022 20:00) (100% - 100%)    GENERAL: No acute distress, well-developed  HEAD:  Atraumatic, Normocephalic  ENT: PERRL, conjunctiva and sclera clear, neck supple, no JVD, moist mucosa, posterior oropharynx clear  CHEST/LUNG: Clear to auscultation bilaterally; No wheeze, equal breath sounds bilaterally, respirations nonlabored  HEART: Regular rate and rhythm; No murmurs, rubs, or gallops  ABDOMEN: Soft, nontender, nondistended; Bowel sounds present, no organomegaly  BACK: no spinal tenderness, no CVA tenderness  EXTREMITIES:  No clubbing, cyanosis, or edema  PSYCH: Nl behavior, nl affect  NEUROLOGY: AAOx1, non-focal, moves all extremities spontaneously  SKIN: Normal color,  Sore on left heel, left shin, left and right hip, and sacrum. (covered during exam) Vital Signs Last 24 Hrs  T(C): 36.6 (27 Mar 2022 20:00), Max: 36.7 (27 Mar 2022 13:05)  T(F): 97.8 (27 Mar 2022 20:00), Max: 98 (27 Mar 2022 13:05)  HR: 92 (27 Mar 2022 20:00) (85 - 92)  BP: 176/92 (27 Mar 2022 20:00) (138/93 - 176/92)  BP(mean): --  RR: 18 (27 Mar 2022 20:00) (18 - 18)  SpO2: 100% (27 Mar 2022 20:00) (100% - 100%)    GENERAL: No acute distress, well-developed, cachectic  HEAD:  Atraumatic, Normocephalic  ENT: PERRL, conjunctiva and sclera clear, neck supple, no JVD, moist mucosa, posterior oropharynx clear  CHEST/LUNG: Clear to auscultation bilaterally; No wheeze, equal breath sounds bilaterally, respirations nonlabored  HEART: Regular rate and rhythm; No murmurs, rubs, or gallops, no LE edema  ABDOMEN: Soft, nontender, nondistended; Bowel sounds present, no organomegaly  BACK: no spinal tenderness, no CVA tenderness  EXTREMITIES:  No clubbing, cyanosis, or edema  NEUROLOGY: AAOx1, non-focal, moves all extremities spontaneously  SKIN: Normal color,  Blister without drainage on L heel, L thigh redness/TTP and upon wound with eschar base. No active drainage. Right hip wound with necrotic appearing skin, no drainage. Also TTP  Sacrum stage II without surrounding erythema or drainage

## 2022-03-27 NOTE — H&P ADULT - ASSESSMENT
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

## 2022-03-27 NOTE — H&P ADULT - NSHPLABSRESULTS_GEN_ALL_CORE
I have personally reviewed this patient's labs below:                        11.9   12.58 )-----------( 190      ( 27 Mar 2022 14:59 )             35.4     03-27-22 @ 14:59    149<H>  |  111<H>  |  15             --------------------------< 101<H>     4.4  |  26  | 0.69    eGFR AA: --  eGFR N-AA: --    Calcium: 9.1  Phosphorus: --  Magnesium: 2.30    AST: 31    ALT: 30  AlkPhos: 101  Protein: 6.8  Albumin: 3.4  TBili: 0.3  D-Bili: --    VBG - ( 27 Mar 2022 14:59 )  pH: 7.35  /  pCO2: 54    /  pO2: 28    / HCO3: 30    / Base Excess: 3.1   /  SvO2: 32.5  / Lactate: 1.6      I have personally reviewed this patient's CXR and my independent interpretation is clear lungs          Imaging reviewed below:  CTH noncontrast:    IMPRESSION:    Examination limited by streak artifact and motion artifact. No evidence   of acute intracranial hemorrhage, brain edema, or mass effect.    CT Pelvis with IV contrast:    IMPRESSION:    Open soft tissue abscess in the left thigh as above.  Additional inflammatory mass in the right thigh soft tissues with rim   enhancement and adjacent fat stranding, suspicious for developing abscess.      Xray left foot:  IMPRESSION: There is no radiographic evidence for osteomyelitis of the   left foot. No significant cortical irregularity or erosion is seen. No   subcutaneous gas is seen. If clinically warranted, an MRI of the foot may   be performed for confirmation.      Review and summation of old records:  CT A/P with IV contrast 12/6/2021:  IMPRESSION:  Status post right femoral ORIF. Right hip soft tissue defect with subcutaneous infiltration and edema of the right hip and medial gluteal subcutaneous tissues.    Rim-enhancing collection overlying the left greater trochanter with adjacent subcutaneous edema. Differential includes bursitis or infection. Pelvic MRI may be helpful for further evaluation.    Fibroid uterus. Question endometrial thickening. Nonemergent pelvic ultrasound can be performed for further evaluation.

## 2022-03-27 NOTE — H&P ADULT - NSHPSOCIALHISTORY_GEN_ALL_CORE
Patient has been dependent on caregivers and family since stroke in 2006. Lives with daughters (alternated between two, one month at a time) and has 12 hour/day support. Hx of alcohol use, but nothing since stroke. No smoking/drug use.

## 2022-03-27 NOTE — ED ADULT NURSE NOTE - OBJECTIVE STATEMENT
Pt received to room 10 alert and oriented x 1 bedbound, daughter at bedside. Pt brought to ER when daughter noticed the presence of worsening pressure injuries on hips bilaterally, in addition to a new pressure injury forming on L heel and L shin. MD bourgeois in progress. Awaiting orders.

## 2022-03-27 NOTE — H&P ADULT - PROBLEM SELECTOR PLAN 7
DVT prophylaxis: lovenox 40mg daily  Diet: puree diet  Dispo: Home pending PT c/w home carvedilol 3.125mg BID

## 2022-03-27 NOTE — ED PROVIDER NOTE - CLINICAL SUMMARY MEDICAL DECISION MAKING FREE TEXT BOX
82F PMH as above here for foot bullae, previous L hip bullae -> ulcer and R hip bullae with c/f underlying soft tissue infection given h/o the same in dec 2021. Will do sepsis w/u, empirically antibiose and admit for further management. 82F PMH as above here for foot bullae, previous L hip bullae -> ulcer and R hip bullae with c/f underlying soft tissue infection given h/o the same in dec 2021. Will do sepsis w/u, empirically antibiose, CTH, CT Pelvis w/ IV and admit for further management.

## 2022-03-27 NOTE — H&P ADULT - PROBLEM SELECTOR PLAN 5
Hx of stroke in 2006, on statin    will continue atorvastatin 40mg daily. baseline dementia, AOx1 - patient has been at baseline mental status per family     - Will continue donepezil 10mg daily

## 2022-03-27 NOTE — H&P ADULT - HISTORY OF PRESENT ILLNESS
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 Per the patient's granddaughter, the [atient has been at her baseline metantal status, however with a  worsening heel wound and a few other wounds on her body from not moving around. Patient is still able to move around when coaxed, but she has been more difficult to encourage lately. [facility] states that th patient has been warm to touch, but no documented fevers at home, no chills, shortness of breath, chest pain, cough, abdominal pain, N/V/D, or  changes in urination.    ED course: 36.7C, 85bpm, 138/93, 100% on RA  - Vanc/zosyn given   - Bl Cx. 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 Per the patient's granddaughter, the patient has been at her baseline metantal status, however with a  worsening heel wound and a few other wounds on her body from not moving around. Patient is still able to move around when coaxed, but she has been more difficult to encourage lately. [facility] states that th patient has been warm to touch, but no documented fevers at home, no chills, shortness of breath, chest pain, cough, abdominal pain, N/V/D, or  changes in urination.    Of note: Patient was recently admitted for lethargy in December. At that time, CTAP revealed right hip soft tissue defect with subcutaneous infiltration and edema of the right hip and medial gluteal subcutaneous tissues. Rim-enhancing collection overlying the left greater trochanter with adjacent subcutaneous edema. She was seen by orthopedics and IR for possible intervention, but ultimately no intervention was planned, as she clinically improved with Zosyn alone. Infectious disease ultimately believes the sepsis may have been due to a skin/soft tissue infection, and recommended to complete an additional 10 days of cephalexin at rehab.     ED course: 36.7C, 85bpm, 138/93, 100% on RA  - Vanc/zosyn given   - Bl Cx. urine Cx pending   - CT pelvis shows Open soft tissue abscess in the left thigh as above.  Additional inflammatory mass in the right thigh soft tissues with rim   enhancement and adjacent fat stranding, suspicious for developing abscess.   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 Per the patient's granddaughter, the patient has been at her baseline metantal status, however with a  worsening heel wound and bilateral hip ulcers/wounds on her body from not moving around. The heel wound is blister-like with fluid and not improving for the past month. She also has redness and drainage from the left hip ulcer. Pt has not been expressing pain from wounds but is a limited historian due to dementia. Patient is still able to move around when coaxed, but she has been more difficult to encourage lately. Grandaughter states that the patient has been warm to touch, but no documented fevers at home, no chills, shortness of breath, chest pain, cough, abdominal pain, N/V/D, or changes in urination.     Of note: Patient was recently admitted for lethargy in December. At that time, CTAP revealed right hip soft tissue defect with subcutaneous infiltration and edema of the right hip and medial gluteal subcutaneous tissues. Rim-enhancing collection overlying the left greater trochanter with adjacent subcutaneous edema. She was seen by orthopedics and IR for possible intervention, but ultimately no intervention was planned, as she clinically improved with Zosyn alone. Infectious disease ultimately believes the sepsis may have been due to a skin/soft tissue infection, and recommended to complete an additional 10 days of cephalexin at rehab.     Granddaughter at bedside states patient with regular diet and is at baseline mental status    ED course: 36.7C, 85bpm, 138/93, 100% on RA  - Vanc/zosyn given   - Bl Cx sent. urine Cx pending   - CT pelvis shows Open soft tissue abscess in the left thigh as above.  Additional inflammatory mass in the right thigh soft tissues with rim   enhancement and adjacent fat stranding, suspicious for developing abscess.

## 2022-03-27 NOTE — ED PROVIDER NOTE - ATTENDING CONTRIBUTION TO CARE
DR. LANG, ATTENDING MD-  I performed a face to face bedside interview with the patient regarding history of present illness, review of symptoms and past medical history. I completed an independent physical exam.  I have discussed the patient's plan of care with the resident.   Documentation as above in the note.    80 y/o female h/o dementia cva htn hld bib daughter for concern of worsening ulcers to hips, blister over left heel, and worsening confusion and fatigue.  Recent admission for infected ulcers at hips.  Pt has ulcer with fibrinous base and surrounding erythema over l hip, l heel with large intact blister with clear fluid, neg nikolsky, warm to touch.  Concern for cellulitis, consider om, confusion likely secondary to metabolic encephalopathy.  Obtain cbc cmp vbg ct head pelvis xr foot give ivf bolus abx admit for further care and evaluation.

## 2022-03-27 NOTE — H&P ADULT - PROBLEM SELECTOR PLAN 6
c/w home carvedilol 3.125mg BID Hx of stroke in 2006, on statin    will continue atorvastatin 40mg daily.  Clarify why not on ASA

## 2022-03-27 NOTE — ED PROVIDER NOTE - NSICDXPASTMEDICALHX_GEN_ALL_CORE_FT
PAST MEDICAL HISTORY:  CVA (cerebrovascular accident) 2006    Dementia AOx1 at baseline    HLD (hyperlipidemia)     Hypertension

## 2022-03-27 NOTE — H&P ADULT - PROBLEM SELECTOR PLAN 3
Sodium currently 149 most likely 2/2 hypovolemia     - will trend NA after fluid (NS 75 mL/ hr) Assessment:   - left hip un-stageable, TTP, erythema, no drainage. Eschar base   - Right hip: not draining. Also TTP, no surrounding erythema   - Left shin: erythematous   - left heel with large blister- x-ray does not show osteo.   Plan:    - Wound care consult   - dietary consult  - podiatry consult in AM for L heel blister. MRI ordered to r/o OM as well

## 2022-03-27 NOTE — PATIENT PROFILE ADULT - FALL HARM RISK - HARM RISK INTERVENTIONS

## 2022-03-27 NOTE — ED PROVIDER NOTE - OBJECTIVE STATEMENT
Ms. 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 Daughter states that patient's mental status has been depressed for about 2-3 days as well, is minimally verbal at b/l but interactive and as of late sleeps a lot more and does not follow commands as well.

## 2022-03-27 NOTE — H&P ADULT - PROBLEM SELECTOR PLAN 8
DVT prophylaxis: lovenox 40mg daily  Diet: puree diet pending S&S eval. On last admission was on soft and bite sized diet with mildly thick liquids  Dispo: Home pending PT

## 2022-03-27 NOTE — ED PROVIDER NOTE - NS ED ROS FT
Gen: No fever, normal appetite  Eyes: No eye irritation or discharge  ENT: No ear pain, congestion, sore throat  Resp: No cough or trouble breathing  Cardiovascular: No chest pain or palpitation  Gastroenteric: No nausea/vomiting, diarrhea, constipation  :  No change in urine output; no dysuria  MS: No joint or muscle pain  Skin: (+) blisters on foot and hip  Neuro: No headache; no abnormal movements  Remainder negative, except as per the HPI

## 2022-03-27 NOTE — H&P ADULT - PROBLEM SELECTOR PLAN 1
Assessment:   - thigh abscess on imaging - White count 12.58  Plan: Assessment:   - thigh abscess on CT imaging - White count 12.58   - s/p vanc/zosyn  Plan   - F/u blood and urine cultures   - c/w fluids   - c/w vanc/zosyn   - will need abscess drainage Assessment:   -CT imaging: open soft tissue abscess in the left thigh as above.  Additional inflammatory mass in the right thigh soft tissues with rim   enhancement and adjacent fat stranding, suspicious for developing abscess.    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 Additional inflammatory mass in the right thigh soft tissues with rim enhancement and adjacent fat stranding, suspicious for developing abscess.  L thigh redness/TTP and upon wound with eschar base. No active drainage. Right hip wound with necrotic appearing skin, no drainage. Also TTP  Thigh abscesses likely bilateral and cannot r/o OM - pt is at risk for due to worsening pressure ulcers chronically and hx abscesses. Inflammatory markers elevated   -MRI pelvis with IV contrast ordered  -Not clear at this time if there is concern for right ORIF hardware infection. Consulted ortho to review imaging and see patient - will see patient  -CRP 93.6 and ESR 56  -s/p vanc/zosyn in ED - continue vanc/zosyn pending cultures  -f/u blood cultures  -IVF  -possible ID consult pending IR drainage and MRI  -IR consult placed to evaluate for drainage of abscesses

## 2022-03-27 NOTE — H&P ADULT - ATTENDING COMMENTS
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 Sepsis (tachy 100, leukocytosis)    #Thigh abscesses  -c/w vanc/zosyn  -consulted ortho given R ORIF  -IR consult for possible drainage  -MRI pelvis to r/o OM, inflammatory markers elevated (though would expect with chronic pressure ulcers). Given recurrent abscesses over pressure wounds, pt at risk for OM  -f/u BCx x2  -likely eventual ID c/s pending MRI and IR    #Heel blister   -abx, MRI, podiatry in AM, wound care c/s    #Hypernatremia  C/w IVF, repeat BMP in AM. Appears dehydrated    #HTN  Coreg    #CVA  Statin. Clarify why not on ASA    #Dementia  Donepezil  S&S eval

## 2022-03-27 NOTE — H&P ADULT - PROBLEM SELECTOR PLAN 2
Assessment:   - left hip un-stageable. cleaned and covered by nursing staff.   Plan:    - Wound care consult   - dietary consult Assessment:   - left hip un-stageable. cleaned and covered by nursing staff.    - left heel - xray does not show osteo.   Plan:    - Wound care consult   - dietary consult Assessment:   - left hip un-stageable. cleaned and covered by nursing staff.    - Right hip: not draining   - Left shin: erythematous   - left heel - x-ray does not show osteo.   Plan:    - Wound care consult   - dietary consult Tachycardia to 100 and leukocytosis with thigh abscesses. Lactate normal  -c/w vanc/zosyn  -MRI  -f/u BCx x2  -obtain UA UCx

## 2022-03-28 DIAGNOSIS — A41.9 SEPSIS, UNSPECIFIED ORGANISM: ICD-10-CM

## 2022-03-28 DIAGNOSIS — I10 ESSENTIAL (PRIMARY) HYPERTENSION: ICD-10-CM

## 2022-03-28 LAB
ANION GAP SERPL CALC-SCNC: 12 MMOL/L — SIGNIFICANT CHANGE UP (ref 7–14)
APPEARANCE UR: CLEAR — SIGNIFICANT CHANGE UP
BACTERIA # UR AUTO: ABNORMAL
BILIRUB UR-MCNC: NEGATIVE — SIGNIFICANT CHANGE UP
BUN SERPL-MCNC: 9 MG/DL — SIGNIFICANT CHANGE UP (ref 7–23)
CALCIUM SERPL-MCNC: 9.5 MG/DL — SIGNIFICANT CHANGE UP (ref 8.4–10.5)
CHLORIDE SERPL-SCNC: 106 MMOL/L — SIGNIFICANT CHANGE UP (ref 98–107)
CO2 SERPL-SCNC: 25 MMOL/L — SIGNIFICANT CHANGE UP (ref 22–31)
COLOR SPEC: SIGNIFICANT CHANGE UP
CREAT SERPL-MCNC: 0.66 MG/DL — SIGNIFICANT CHANGE UP (ref 0.5–1.3)
DIFF PNL FLD: ABNORMAL
EGFR: 88 ML/MIN/1.73M2 — SIGNIFICANT CHANGE UP
EPI CELLS # UR: 1 /HPF — SIGNIFICANT CHANGE UP (ref 0–5)
GLUCOSE SERPL-MCNC: 92 MG/DL — SIGNIFICANT CHANGE UP (ref 70–99)
GLUCOSE UR QL: NEGATIVE — SIGNIFICANT CHANGE UP
HCT VFR BLD CALC: 37.5 % — SIGNIFICANT CHANGE UP (ref 34.5–45)
HGB BLD-MCNC: 12.2 G/DL — SIGNIFICANT CHANGE UP (ref 11.5–15.5)
KETONES UR-MCNC: NEGATIVE — SIGNIFICANT CHANGE UP
LEUKOCYTE ESTERASE UR-ACNC: ABNORMAL
MAGNESIUM SERPL-MCNC: 2.2 MG/DL — SIGNIFICANT CHANGE UP (ref 1.6–2.6)
MCHC RBC-ENTMCNC: 29 PG — SIGNIFICANT CHANGE UP (ref 27–34)
MCHC RBC-ENTMCNC: 32.5 GM/DL — SIGNIFICANT CHANGE UP (ref 32–36)
MCV RBC AUTO: 89.1 FL — SIGNIFICANT CHANGE UP (ref 80–100)
NITRITE UR-MCNC: NEGATIVE — SIGNIFICANT CHANGE UP
NRBC # BLD: 0 /100 WBCS — SIGNIFICANT CHANGE UP
NRBC # FLD: 0 K/UL — SIGNIFICANT CHANGE UP
PH UR: 7 — SIGNIFICANT CHANGE UP (ref 5–8)
PHOSPHATE SERPL-MCNC: 4.1 MG/DL — SIGNIFICANT CHANGE UP (ref 2.5–4.5)
PLATELET # BLD AUTO: 181 K/UL — SIGNIFICANT CHANGE UP (ref 150–400)
POTASSIUM SERPL-MCNC: 4.2 MMOL/L — SIGNIFICANT CHANGE UP (ref 3.5–5.3)
POTASSIUM SERPL-SCNC: 4.2 MMOL/L — SIGNIFICANT CHANGE UP (ref 3.5–5.3)
PROT UR-MCNC: ABNORMAL
RBC # BLD: 4.21 M/UL — SIGNIFICANT CHANGE UP (ref 3.8–5.2)
RBC # FLD: 15.5 % — HIGH (ref 10.3–14.5)
RBC CASTS # UR COMP ASSIST: <5 /HPF — SIGNIFICANT CHANGE UP (ref 0–4)
SODIUM SERPL-SCNC: 143 MMOL/L — SIGNIFICANT CHANGE UP (ref 135–145)
SP GR SPEC: >1.05 (ref 1–1.05)
UROBILINOGEN FLD QL: SIGNIFICANT CHANGE UP
WBC # BLD: 12.41 K/UL — HIGH (ref 3.8–10.5)
WBC # FLD AUTO: 12.41 K/UL — HIGH (ref 3.8–10.5)
WBC UR QL: SIGNIFICANT CHANGE UP /HPF (ref 0–5)

## 2022-03-28 PROCEDURE — 99222 1ST HOSP IP/OBS MODERATE 55: CPT

## 2022-03-28 PROCEDURE — 73552 X-RAY EXAM OF FEMUR 2/>: CPT | Mod: 26,RT

## 2022-03-28 PROCEDURE — 72170 X-RAY EXAM OF PELVIS: CPT | Mod: 26

## 2022-03-28 RX ORDER — MUPIROCIN 20 MG/G
1 OINTMENT TOPICAL
Refills: 0 | Status: DISCONTINUED | OUTPATIENT
Start: 2022-03-28 | End: 2022-04-07

## 2022-03-28 RX ORDER — VANCOMYCIN HCL 1 G
1000 VIAL (EA) INTRAVENOUS EVERY 24 HOURS
Refills: 0 | Status: DISCONTINUED | OUTPATIENT
Start: 2022-03-28 | End: 2022-03-29

## 2022-03-28 RX ORDER — ENOXAPARIN SODIUM 100 MG/ML
30 INJECTION SUBCUTANEOUS EVERY 24 HOURS
Refills: 0 | Status: DISCONTINUED | OUTPATIENT
Start: 2022-03-29 | End: 2022-04-07

## 2022-03-28 RX ADMIN — MUPIROCIN 1 APPLICATION(S): 20 OINTMENT TOPICAL at 17:59

## 2022-03-28 RX ADMIN — PIPERACILLIN AND TAZOBACTAM 25 GRAM(S): 4; .5 INJECTION, POWDER, LYOPHILIZED, FOR SOLUTION INTRAVENOUS at 09:47

## 2022-03-28 RX ADMIN — Medication 650 MILLIGRAM(S): at 12:47

## 2022-03-28 RX ADMIN — PIPERACILLIN AND TAZOBACTAM 25 GRAM(S): 4; .5 INJECTION, POWDER, LYOPHILIZED, FOR SOLUTION INTRAVENOUS at 18:45

## 2022-03-28 RX ADMIN — ENOXAPARIN SODIUM 40 MILLIGRAM(S): 100 INJECTION SUBCUTANEOUS at 04:48

## 2022-03-28 RX ADMIN — PIPERACILLIN AND TAZOBACTAM 25 GRAM(S): 4; .5 INJECTION, POWDER, LYOPHILIZED, FOR SOLUTION INTRAVENOUS at 00:22

## 2022-03-28 RX ADMIN — Medication 250 MILLIGRAM(S): at 17:43

## 2022-03-28 RX ADMIN — Medication 650 MILLIGRAM(S): at 12:31

## 2022-03-28 NOTE — CONSULT NOTE ADULT - ASSESSMENT
A/P: This is a 81y Female with history of soft tissue infections around the hips now presenting with draining abscess over L hip and concern for developing abscess over R hip associated with prior orthopedic hardware    - FU IR consult for drainage of collections   - FU MRI pelvis w/wo contrast  - Pain control  - IV antibiotics per primary team  - FU cultures/labs  - FU ID recs  - Appreciate care per primary team

## 2022-03-28 NOTE — CONSULT NOTE ADULT - ASSESSMENT
80yo F w/ left lateral heel blister  - pt seen and evaluated  - afebrile, WBC 12.41  - left foot lateral heel serous filled blister, no erythema, no malodor, no probing wound, no acute signs of infection   - Left heel blister drained at bedside using sterile suture removal kit, serous drainage only, no purulence, no malodor, no underlying wound probing to bone, no acute signs of infection  - left foot xray negative for OM, negative for gas  - continue offloading b/l heels w/ z-flow boots at all times while in bed  - no acute podiatric intervention necessary, recommend continue LWC w/ mupirocin and allevyn pad daily   - discussed w/ attending  80yo F w/ left lateral heel blister  - pt seen and evaluated  - afebrile, WBC 12.41  - left foot lateral heel serous filled blister, no erythema, no malodor, no probing wound, no acute signs of infection   - right foot no open wounds or lesions  - Left heel blister drained at bedside using sterile suture removal kit, serous drainage only, no purulence, no malodor, no underlying wound probing to bone, no acute signs of infection  - left foot xray negative for OM, negative for gas  - continue offloading b/l heels w/ z-flow boots at all times while in bed  - no acute podiatric intervention necessary, recommend continue LWC w/ mupirocin and allevyn pad daily   - discussed w/ attending

## 2022-03-28 NOTE — CONSULT NOTE ADULT - SUBJECTIVE AND OBJECTIVE BOX
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 Per the patient's granddaughter, the patient has been at her baseline metantal status, however with a  worsening heel wound and bilateral hip ulcers/wounds on her body from not moving around. The heel wound is blister-like with fluid and not improving for the past month. She also has redness and drainage from the left hip ulcer. Pt has not been expressing pain from wounds but is a limited historian due to dementia. Patient is still able to move around when coaxed, but she has been more difficult to encourage lately. Granddaughter states that the patient has been warm to touch, but no documented fevers at home, no chills, shortness of breath, chest pain, cough, abdominal pain, N/V/D, or changes in urination.     Of note, patient was recently admitted in December 2021 for lethargy, sepsis workup, and worsening L hip wounds. Orthopedics was following to rule-out involvement of prior orthopedic hardware in the infection. IR aspiration of L hip was deferred due to size of her abscess and resolution of her symptoms with IV antibiotics. Current imaging shows an inflammatory mass in R thigh possibly involving hip and an open, draining left hip abscess.    Review of systems: As per HPI, otherwise negative.     PAST MEDICAL & SURGICAL HISTORY:  Dementia  AOx1 at baseline    Hypertension    HLD (hyperlipidemia)    CVA (cerebrovascular accident)  2006    No significant past surgical history    S/P ORIF (open reduction internal fixation) fracture      Family History: FAMILY HISTORY:  No pertinent family history in first degree relatives        Medications: MEDICATIONS  (STANDING):  enoxaparin Injectable 40 milliGRAM(s) SubCutaneous every 24 hours  influenza  Vaccine (HIGH DOSE) 0.7 milliLiter(s) IntraMuscular once  piperacillin/tazobactam IVPB.. 3.375 Gram(s) IV Intermittent every 8 hours  sodium chloride 0.45%. 1000 milliLiter(s) (75 mL/Hr) IV Continuous <Continuous>  vancomycin  IVPB 1000 milliGRAM(s) IV Intermittent every 24 hours    MEDICATIONS  (PRN):  acetaminophen     Tablet .. 650 milliGRAM(s) Oral every 6 hours PRN Temp greater or equal to 38C (100.4F), Mild Pain (1 - 3)      T(C): 37.1 (03-28-22 @ 00:30), Max: 37.1 (03-28-22 @ 00:30)  HR: 100 (03-28-22 @ 00:30) (85 - 100)  BP: 133/70 (03-28-22 @ 00:30) (133/70 - 176/92)  RR: 18 (03-28-22 @ 00:30) (18 - 18)  SpO2: 100% (03-28-22 @ 00:30) (100% - 100%)  Wt(kg): --                        11.9   12.58 )-----------( 190      ( 27 Mar 2022 14:59 )             35.4     03-27    149<H>  |  111<H>  |  15  ----------------------------<  101<H>  4.4   |  26  |  0.69    Ca    9.1      27 Mar 2022 14:59  Mg     2.30     03-27    TPro  6.8  /  Alb  3.4  /  TBili  0.3  /  DBili  x   /  AST  31  /  ALT  30  /  AlkPhos  101  03-27      EXAM:  Gen: Exam is very limited by patient's mental status  Resp: no increased WOB on RA  RLE:  Chronic wound over R hip  Patient reacting to touching of wound, seemingly TTP  No appreciable knee joint effusion/warmth  Full of knee/ankle  Grossly SILT distally, patient responding to touch  Motor grossly intact, patient observed spontaneously moving toes and foot  2+ DP pulse  LLE:  Open chronic wound on L hip  No appreciable knee joint effusion/warmth  Full ROM of knee/ankle  Grossly SILT distally, patient responding to touch  Motor grossly intact, patient observed spontaneously moving toes and foot  2+ PT/DP pulses    Labs:      Imaging: CT Pelvis w/wo shows open draining abscess over L hip with gas inside, also shows rim-enhancing inflammatory mass in subcutaneous tissue over R hip, with concern for developing abscess

## 2022-03-28 NOTE — SWALLOW BEDSIDE ASSESSMENT ADULT - COMMENTS
As per HPI, pt is a "81F with PMHx HTN, CVA w/o focal residual deficits, Alzheimer's dementia (AOx1 at baseline) who presents with worsening left heel wound. Per the patient's granddaughter, the patient has been at her baseline metantal status, however with a  worsening heel wound and bilateral hip ulcers/wounds on her body from not moving around. The heel wound is blister-like with fluid and not improving for the past month. She also has redness and drainage from the left hip ulcer. Pt has not been expressing pain from wounds but is a limited historian due to dementia. Patient is still able to move around when coaxed, but she has been more difficult to encourage lately. Granddaughter states that the patient has been warm to touch, but no documented fevers at home, no chills, shortness of breath, chest pain, cough, abdominal pain, N/V/D, or changes in urination."    WBC is elevated. CXR 3/27/22, pt is a "Clear lungs."    Patient received awake/alert, seated upright in bed, during clinical swallow evaluation this AM. Family friend present at bedside who served as a reliable informant. Patient noted with confusion, and unable to follow directions and/or answer questions appropriately. However, patient able to participate and accept PO trials. Per friend, patient tolerates a baseline diet of Regular solids and thin liquids in the home environment. Patient noted to be grinding her teeth repetitively, prior to PO trials. RN made aware.

## 2022-03-28 NOTE — CONSULT NOTE ADULT - SUBJECTIVE AND OBJECTIVE BOX
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 Per the patient's granddaughter, the patient has been at her baseline metantal status, however with a  worsening heel wound and bilateral hip ulcers/wounds on her body from not moving around. The heel wound is blister-like with fluid and not improving for the past month. She also has redness and drainage from the left hip ulcer. Pt has not been expressing pain from wounds but is a limited historian due to dementia. Patient is still able to move around when coaxed, but she has been more difficult to encourage lately. Grandaughter states that the patient has been warm to touch, but no documented fevers at home, no chills, shortness of breath, chest pain, cough, abdominal pain, N/V/D, or changes in urination.     ID-  additional hx from family friend/ patient caregiver at bedside. Reports hip wounds are changed twice daily and have improved.  Patient has good appetite, gets OOB to chair and walks to bathroom with 2 person assistance.        PAST MEDICAL & SURGICAL HISTORY:  Dementia  AOx1 at baseline    Hypertension    HLD (hyperlipidemia)    CVA (cerebrovascular accident)  2006    S/P ORIF (open reduction internal fixation) fracture        Allergies    No Known Drug Allergies  shellfish (Swelling)    Intolerances        ANTIMICROBIALS:  piperacillin/tazobactam IVPB.. 3.375 every 8 hours  vancomycin  IVPB 1000 every 24 hours      OTHER MEDS:  acetaminophen     Tablet .. 650 milliGRAM(s) Oral every 6 hours PRN  influenza  Vaccine (HIGH DOSE) 0.7 milliLiter(s) IntraMuscular once  mupirocin 2% Ointment 1 Application(s) Topical two times a day  sodium chloride 0.45%. 1000 milliLiter(s) IV Continuous <Continuous>      SOCIAL HISTORY: lives home with daughters    FAMILY HISTORY:  No pertinent family history in first degree relatives        REVIEW OF SYSTEMS  [ x ] ROS unobtainable because:  dementia  [  ] All other systems negative except as noted below:	    Constitutional:  [ ] fever [ ] chills  [ ] weight loss  [ ] weakness  Skin:  [ ] rash [ ] phlebitis	  Eyes: [ ] icterus [ ] pain  [ ] discharge	  ENMT: [ ] sore throat  [ ] thrush [ ] ulcers [ ] exudates  Respiratory: [ ] dyspnea [ ] hemoptysis [ ] cough [ ] sputum	  Cardiovascular:  [ ] chest pain [ ] palpitations [ ] edema	  Gastrointestinal:  [ ] nausea [ ] vomiting [ ] diarrhea [ ] constipation [ ] pain	  Genitourinary:  [ ] dysuria [ ] frequency [ ] hematuria [ ] discharge [ ] flank pain  [ ] incontinence  Musculoskeletal:  [ ] myalgias [ ] arthralgias [ ] arthritis  [ ] back pain  Neurological:  [ ] headache [ ] seizures  [ ] confusion/altered mental status  Psychiatric:  [ ] anxiety [ ] depression	  Hematology/Lymphatics:  [ ] lymphadenopathy  Endocrine:  [ ] adrenal [ ] thyroid  Allergic/Immunologic:	 [ ] transplant [ ] seasonal    PHYSICAL EXAM:  Constitutional: Not in acute distress, awakens to name, minimal verbal response  Eyes: No icterus.  Oral cavity: Clear, no lesions  Neck: Supple  RS: Chest clear anteriorly  CVS: S1, S2   Abdomen: Soft. No guarding/rigidity/tenderness.  : diaper  Extremities: left hip ulcer over hip, no tenderness, swelling or erythema thigh bilaterally  Skin: No rash  Neuro: weak appearing, minimal verbal    Drug Dosing Weight  Height (cm): 157.5 (27 Mar 2022 13:05)  Weight (kg): 45.2 (28 Mar 2022 00:30)  BMI (kg/m2): 18.2 (28 Mar 2022 00:30)  BSA (m2): 1.42 (28 Mar 2022 00:30)    Vital Signs Last 24 Hrs  T(F): 98 (03-28-22 @ 11:11), Max: 98.7 (03-28-22 @ 00:30)    Vital Signs Last 24 Hrs  HR: 92 (03-28-22 @ 11:11) (92 - 100)  BP: 118/81 (03-28-22 @ 11:11) (118/81 - 176/92)  RR: 17 (03-28-22 @ 11:11)  SpO2: 100% (03-28-22 @ 11:11) (100% - 100%)  Wt(kg): --                          12.2   12.41 )-----------( 181      ( 28 Mar 2022 06:26 )             37.5       03-28    143  |  106  |  9   ----------------------------<  92  4.2   |  25  |  0.66    Ca    9.5      28 Mar 2022 06:26  Phos  4.1     03-28  Mg     2.20     03-28    TPro  6.8  /  Alb  3.4  /  TBili  0.3  /  DBili  x   /  AST  31  /  ALT  30  /  AlkPhos  101  03-27      Urinalysis Basic - ( 28 Mar 2022 03:39 )    Color: Light Yellow / Appearance: Clear / SG: >1.050 / pH: x  Gluc: x / Ketone: Negative  / Bili: Negative / Urobili: <2 mg/dL   Blood: x / Protein: Trace / Nitrite: Negative   Leuk Esterase: Large / RBC: <5 /HPF / WBC 5-10 /HPF   Sq Epi: x / Non Sq Epi: 1 /HPF / Bacteria: Occasional        MICROBIOLOGY:    blood culture x 2 testing  urine culture testing      RADIOLOGY:    ra< from: Xray Pelvis AP only (03.28.22 @ 10:31) >  ACC: [id number] EXAM:  XR PELVIS AP ONLY 1-2 VIEWS                        ACC: [id number] EXAM:  XR FEMUR 2 VIEWS RT                          PROCEDURE DATE:  03/28/2022          INTERPRETATION:  CLINICAL INDICATION: Concern for hardware infection.    EXAM: 2 views of the pelvis and 2 views of the right hip/femur    COMPARISON: 12/8/2021      IMPRESSION:  Limited secondary to positioning. Right femoral short intramedullary mohini   with proximal compression and distal interlocking screw. Hardware   alignment does not appear significantly changed when compared with prior   radiograph. Right hip joint space is grossly maintained. There is   partially imaged moderate to severe tricompartmental knee arthrosis.   Calcific density projects over the pelvis.    --- End of Report ---            ADELSO HUFF MD; Attending Radiologist  This document has been electronically signed. Mar 28 2022 10:40AM    < end of copied text >  < from: CT Pelvis w/ IV Cont (03.27.22 @ 17:23) >    ACC: [id number] EXAM:  CT PELVIS ONLY IC                          PROCEDURE DATE:  03/27/2022          INTERPRETATION:  CLINICAL INFORMATION: 81-year-old female with dementia   and left hip ulceration. History  bilateral hip soft tissue infection   December 2021    COMPARISON: Pelvic x-ray from 12/8/2021. CT 12/06/2021   CONTRAST/COMPLICATIONS:  IV Contrast: Omnipaque 350  90 cc administered   10 cc discarded  Oral Contrast: NONE  Complications: None reported at time of study completion    PROCEDURE:  CT of the Pelvis was performed.  Sagittal and coronal reformats were performed.    FINDINGS:  BLADDER: Within normal limits.  REPRODUCTIVE ORGANS: Retroflexed uterus with large calcified fibroid  LYMPH NODES: No pelvic lymphadenopathy.    VISUALIZED PORTIONS:  ABDOMINAL ORGANS: Cholelithiasis.  BOWEL: Distended rectum with feces  PERITONEUM: No ascites.  VESSELS: Within normal limits.  ABDOMINAL WALL: Left lateral thigh soft tissue defect with adjacent   rim-enhancing collection of fluid andlocules of gas with associated soft   tissue infiltration, compatible with draining abscess. In the right thigh   soft tissues there is a heterogeneous inflammatory mass measuring 3.6 x   1.5 x 2.3 cm with rim enhancement which may represent phlegmonand a site   of fat necrosis (300-79).  BONES: Right hip ORIF. Asymmetric sclerosis of the right acetabulum.   Degenerative changes of the spine.    IMPRESSION:    Open soft tissue abscess in the left thigh as above.  Additional inflammatory mass in the right thigh soft tissues with rim   enhancement and adjacent fat stranding, suspicious for developing abscess.    --- End of Report ---          JOANNE DAWKINS MD; Resident Radiologist  This document has been electronically signed.  VALDO MIR MD; Attending Radiologist  This document has been electronically signed. Mar 27 2022  6:32PM    < end of copied text >  < from: Xray Femur 2 Views, Right (03.28.22 @ 10:30) >    ACC: [id number] EXAM:  XR PELVIS AP ONLY 1-2 VIEWS                        ACC: [id number] EXAM:  XR FEMUR 2 VIEWS RT                          PROCEDURE DATE:  03/28/2022          INTERPRETATION:  CLINICAL INDICATION: Concern for hardware infection.    EXAM: 2 views of the pelvis and 2 views of the right hip/femur    COMPARISON: 12/8/2021      IMPRESSION:  Limited secondary to positioning. Right femoral short intramedullary mohini   with proximal compression and distal interlocking screw. Hardware   alignment does not appear significantly changed when compared with prior   radiograph. Right hip joint space is grossly maintained. There is   partially imaged moderate to severe tricompartmental knee arthrosis.   Calcific density projects over the pelvis.    --- End of Report ---            ADELSO HUFF MD; Attending Radiologist  This document has been electronically signed. Mar 28 2022 10:40AM    < end of copied text >

## 2022-03-28 NOTE — SWALLOW BEDSIDE ASSESSMENT ADULT - SWALLOW EVAL: DIAGNOSIS
1) Mild oral dysphagia for regular solids marked by prolonged manipulation and slow mastication, with delayed bolus collection resulting in delayed posterior bolus transport. Mild lingual stasis noted which was cleared with liquid wash. 2) Functional oral stage of swallow for puree, mildly thick fluids, and thin fluids marked by adequate bolus manipulation and coordination. Adequate bolus collection and timely anterior to posterior oral transit/transfer noted. Oral clearance noted. 3) Functional pharyngeal stage of swallow for regular solids, puree, mildly thick fluids, and thin fluids marked by initiation of pharyngeal swallow trigger and hyolaryngeal excursion noted upon digital palpation. No overt signs/symptoms of penetration/aspiration noted.

## 2022-03-28 NOTE — CONSULT NOTE ADULT - ASSESSMENT
Interventional Radiology    Evaluate for Procedure:     HPI: 82 yo F with a history of HTN, CVA w/o focal residual deficits, Alzheimer's dementia (AOx1 at baseline) who presents with worsening left heel wound found to have a left thigh abscess and likely right thigh abscess    Allergies:   Medications (Abx/Cardiac/Anticoagulation/Blood Products)    enoxaparin Injectable: 40 milliGRAM(s) SubCutaneous (03-28 @ 04:48)  piperacillin/tazobactam IVPB..: 25 mL/Hr IV Intermittent (03-28 @ 00:22)  piperacillin/tazobactam IVPB...: 200 mL/Hr IV Intermittent (03-27 @ 16:16)  vancomycin  IVPB: 250 mL/Hr IV Intermittent (03-27 @ 17:30)    Data:  157.5  45.2  T(C): 36.8  HR: 96  BP: 120/71  RR: 18  SpO2: 100%    -WBC 12.41 / HgB 12.2 / Hct 37.5 / Plt 181  -Na 143 / Cl 106 / BUN 9 / Glucose 92  -K 4.2 / CO2 25 / Cr 0.66  -ALT -- / Alk Phos -- / T.Bili --  -INR 1.07 / PTT 27.0      Radiology:     Assessment/Plan:   82 yo F with a history of HTN, CVA w/o focal residual deficits, Alzheimer's dementia (AOx1 at baseline) who presents with worsening left heel wound found to have a left thigh abscess and likely right thigh abscess    -- Imaging reviewed with attending physician. At this time L thigh abscess currently open and draining and no need for drainage catheter placement. No drainable collection on R. Inflammation/phlegmon at this time. Recommended repeat imaging in a few days to evaluate if area walls off. Given imaging finding IR will defer c/s at this time. Please reconsult if follow up imaging is performed and displays collection(s) amenable to drainage.

## 2022-03-28 NOTE — PROGRESS NOTE ADULT - SUBJECTIVE AND OBJECTIVE BOX
Date of Service  : 03-28-22 @ 11:30    INTERVAL HPI/OVERNIGHT EVENTS:  Vital Signs Last 24 Hrs  T(C): 36.7 (28 Mar 2022 11:11), Max: 37.1 (28 Mar 2022 00:30)  T(F): 98 (28 Mar 2022 11:11), Max: 98.7 (28 Mar 2022 00:30)  HR: 92 (28 Mar 2022 11:11) (85 - 100)  BP: 118/81 (28 Mar 2022 11:11) (118/81 - 176/92)  BP(mean): --  RR: 17 (28 Mar 2022 11:11) (17 - 18)  SpO2: 100% (28 Mar 2022 11:11) (100% - 100%)  I&O's Summary    27 Mar 2022 07:01  -  28 Mar 2022 07:00  --------------------------------------------------------  IN: 700 mL / OUT: 0 mL / NET: 700 mL      MEDICATIONS  (STANDING):  enoxaparin Injectable 40 milliGRAM(s) SubCutaneous every 24 hours  influenza  Vaccine (HIGH DOSE) 0.7 milliLiter(s) IntraMuscular once  piperacillin/tazobactam IVPB.. 3.375 Gram(s) IV Intermittent every 8 hours  sodium chloride 0.45%. 1000 milliLiter(s) (75 mL/Hr) IV Continuous <Continuous>  vancomycin  IVPB 1000 milliGRAM(s) IV Intermittent every 24 hours    MEDICATIONS  (PRN):  acetaminophen     Tablet .. 650 milliGRAM(s) Oral every 6 hours PRN Temp greater or equal to 38C (100.4F), Mild Pain (1 - 3)    LABS:                        12.2   12.41 )-----------( 181      ( 28 Mar 2022 06:26 )             37.5     03-28    143  |  106  |  9   ----------------------------<  92  4.2   |  25  |  0.66    Ca    9.5      28 Mar 2022 06:26  Phos  4.1     03-28  Mg     2.20     03-28    TPro  6.8  /  Alb  3.4  /  TBili  0.3  /  DBili  x   /  AST  31  /  ALT  30  /  AlkPhos  101  03-27      Urinalysis Basic - ( 28 Mar 2022 03:39 )    Color: Light Yellow / Appearance: Clear / SG: >1.050 / pH: x  Gluc: x / Ketone: Negative  / Bili: Negative / Urobili: <2 mg/dL   Blood: x / Protein: Trace / Nitrite: Negative   Leuk Esterase: Large / RBC: <5 /HPF / WBC 5-10 /HPF   Sq Epi: x / Non Sq Epi: 1 /HPF / Bacteria: Occasional      CAPILLARY BLOOD GLUCOSE            Urinalysis Basic - ( 28 Mar 2022 03:39 )    Color: Light Yellow / Appearance: Clear / SG: >1.050 / pH: x  Gluc: x / Ketone: Negative  / Bili: Negative / Urobili: <2 mg/dL   Blood: x / Protein: Trace / Nitrite: Negative   Leuk Esterase: Large / RBC: <5 /HPF / WBC 5-10 /HPF   Sq Epi: x / Non Sq Epi: 1 /HPF / Bacteria: Occasional      REVIEW OF SYSTEMS:  CONSTITUTIONAL: No fever, weight loss, or fatigue  EYES: No eye pain, visual disturbances, or discharge  ENMT:  No difficulty hearing, tinnitus, vertigo; No sinus or throat pain  NECK: No pain or stiffness  RESPIRATORY: No cough, wheezing, chills or hemoptysis; No shortness of breath  CARDIOVASCULAR: No chest pain, palpitations, dizziness, or leg swelling  GASTROINTESTINAL: No abdominal or epigastric pain. No nausea, vomiting, or hematemesis; No diarrhea or constipation. No melena or hematochezia.  GENITOURINARY: No dysuria, frequency, hematuria, or incontinence  NEUROLOGICAL: No headaches, memory loss, loss of strength, numbness, or tremors  SKIN: No itching, burning, rashes, or lesions   ENDOCRINE: No heat or cold intolerance; No hair loss  MUSCULOSKELETAL: No joint pain or swelling; No muscle, back, or extremity pain  PSYCHIATRIC: No depression, anxiety, mood swings, or difficulty sleeping  HEME/LYMPH: No easy bruising, or bleeding gums  ALLERY AND IMMUNOLOGIC: No hives or eczema    RADIOLOGY & ADDITIONAL TESTS:    Consultant(s) Notes Reviewed:  [x ] YES  [ ] NO    PHYSICAL EXAM:  GENERAL: NAD, well-groomed, well-developed,not in any distress ,  HEAD:  Atraumatic, Normocephalic  EYES: EOMI, PERRLA, conjunctiva and sclera clear  ENMT: No tonsillar erythema, exudates, or enlargement; Moist mucous membranes, Good dentition, No lesions  NECK: Supple, No JVD, Normal thyroid  NERVOUS SYSTEM:  Alert & Oriented X3, No focal deficit   CHEST/LUNG: Good air entry bilateral with no  rales, rhonchi, wheezing, or rubs  HEART: Regular rate and rhythm; No murmurs, rubs, or gallops  ABDOMEN: Soft, Nontender, Nondistended; Bowel sounds present  EXTREMITIES:  2+ Peripheral Pulses, No clubbing, cyanosis, or edema  SKIN: No rashes or lesions    Care Discussed with Consultants/Other Providers [ x] YES  [ ] NO Date of Service  : 03-28-22     INTERVAL HPI/OVERNIGHT EVENTS: No new concerns.   Vital Signs Last 24 Hrs  T(C): 36.7 (28 Mar 2022 11:11), Max: 37.1 (28 Mar 2022 00:30)  T(F): 98 (28 Mar 2022 11:11), Max: 98.7 (28 Mar 2022 00:30)  HR: 92 (28 Mar 2022 11:11) (85 - 100)  BP: 118/81 (28 Mar 2022 11:11) (118/81 - 176/92)  BP(mean): --  RR: 17 (28 Mar 2022 11:11) (17 - 18)  SpO2: 100% (28 Mar 2022 11:11) (100% - 100%)  I&O's Summary    27 Mar 2022 07:01  -  28 Mar 2022 07:00  --------------------------------------------------------  IN: 700 mL / OUT: 0 mL / NET: 700 mL      MEDICATIONS  (STANDING):  enoxaparin Injectable 40 milliGRAM(s) SubCutaneous every 24 hours  influenza  Vaccine (HIGH DOSE) 0.7 milliLiter(s) IntraMuscular once  piperacillin/tazobactam IVPB.. 3.375 Gram(s) IV Intermittent every 8 hours  sodium chloride 0.45%. 1000 milliLiter(s) (75 mL/Hr) IV Continuous <Continuous>  vancomycin  IVPB 1000 milliGRAM(s) IV Intermittent every 24 hours    MEDICATIONS  (PRN):  acetaminophen     Tablet .. 650 milliGRAM(s) Oral every 6 hours PRN Temp greater or equal to 38C (100.4F), Mild Pain (1 - 3)    LABS:                        12.2   12.41 )-----------( 181      ( 28 Mar 2022 06:26 )             37.5     03-28    143  |  106  |  9   ----------------------------<  92  4.2   |  25  |  0.66    Ca    9.5      28 Mar 2022 06:26  Phos  4.1     03-28  Mg     2.20     03-28    TPro  6.8  /  Alb  3.4  /  TBili  0.3  /  DBili  x   /  AST  31  /  ALT  30  /  AlkPhos  101  03-27      Urinalysis Basic - ( 28 Mar 2022 03:39 )    Color: Light Yellow / Appearance: Clear / SG: >1.050 / pH: x  Gluc: x / Ketone: Negative  / Bili: Negative / Urobili: <2 mg/dL   Blood: x / Protein: Trace / Nitrite: Negative   Leuk Esterase: Large / RBC: <5 /HPF / WBC 5-10 /HPF   Sq Epi: x / Non Sq Epi: 1 /HPF / Bacteria: Occasional      CAPILLARY BLOOD GLUCOSE            Urinalysis Basic - ( 28 Mar 2022 03:39 )    Color: Light Yellow / Appearance: Clear / SG: >1.050 / pH: x  Gluc: x / Ketone: Negative  / Bili: Negative / Urobili: <2 mg/dL   Blood: x / Protein: Trace / Nitrite: Negative   Leuk Esterase: Large / RBC: <5 /HPF / WBC 5-10 /HPF   Sq Epi: x / Non Sq Epi: 1 /HPF / Bacteria: Occasional          Consultant(s) Notes Reviewed:  [x ] YES  [ ] NO    PHYSICAL EXAM:  GENERAL: NAD, not in any distress ,  HEAD:  Atraumatic, Normocephalic  NECK: Supple, No JVD, Normal thyroid  NERVOUS SYSTEM:  Alert & , No focal deficit   CHEST/LUNG: Good air entry bilateral with no  rales, rhonchi, wheezing, or rubs  HEART: Regular rate and rhythm; No murmurs, rubs, or gallops  ABDOMEN: Soft, Nontender, Nondistended; Bowel sounds present  EXTREMITIES:  Dressed     Care Discussed with Consultants/Other Providers [ x] YES  [ ] NO

## 2022-03-28 NOTE — ADVANCED PRACTICE NURSE CONSULT - REASON FOR CONSULT
Patient seen by Podiatry for Left heel blister s/p drainage, topical recommendations in the chart.     Please contact Wound/Ostomy Care Service Line if we can be of further assistance (ext [phone]).

## 2022-03-28 NOTE — CHART NOTE - NSCHARTNOTEFT_GEN_A_CORE
Infectious disease called earlier today and consult requested.   Will follow up with ID recs and continue current antibiotics for now.   Additionally, per my discussion with ortho, they would defer to IR for any further imaging if required of R thigh.   Per my discussion with IR, would hold off on getting MRI pelvis at this time and instead to consider repeat CT imaging in a few days to determine progression of possible infection.  Podiatry recommendations appreciated. No signs of acute infection of L heel; further imaging not recommended at this time per their note.   MRI pelvis and MRI L foot discontinued for now.   Case and plan discussed with Dr. Garduno.

## 2022-03-28 NOTE — CONSULT NOTE ADULT - ASSESSMENT
80 yo woman with h/o CVA, dementia, h/o right hip ORIF presenting with non healing wound over left hip, left heel.  Afebrile.  WBC 13K.  Pelvic x ray shows hardware alignment unchanged, right hip joint space grossly maintained.    CT pelvis shows open soft tissue abscess left thigh (rim enhancing collection of fluid and locules of gas) c/w draining abscess and right thigh inflammatory mass  3 x 2 x 1 cm which may represent phlegmon and fat necrosis.  Blood cultures testing.    Lt hip ulcer with CT evidence draining abscess and right thigh phlegmon.  s/p rt hip ORIF; pelvic x ray not suggestive of hardware infection.  Ortho evaluating.  Dementia    Suggest;  follow blood culture results  agree with empiric Vanco 1 gm iv q 24 h and zosyn q 8 h    will follow    Rachell Dunbar MD  Pager: [phone]  After 5 PM or weekends please call fellow on call or office [phone]

## 2022-03-28 NOTE — CHART NOTE - NSCHARTNOTEFT_GEN_A_CORE
Case and imaging reviewed with attending orthopaedic surgeon Dr. Kramer. CT with draining abscess over L lateral thigh (nonoperative side) and inflammatory/phlegmonous changes superficial to the fascia on the L lateral thigh (operative side). No evidence for infection tracking deep to fascia, infected hardware, hardware failure, or septic hip.     - Appreciate IR recommendations  - Wound care consult  - Frequent repositioning, low pressure air mattress, offloading heel boots, caregiver training  - Abx per medicine/ID  - No acute orthopaedic surgical intervention indicated. We will sign off at this time. Please re-consult with questions.    Blanca Reilly PGY-2  Orthopaedic Surgery  [facility] [id number]  [facility] [id number]  [facility] [id number] Case and imaging reviewed with attending orthopaedic surgeon Dr. Kramer. CT with draining abscess over L lateral thigh (nonoperative side) and inflammatory/phlegmonous changes superficial to the fascia on the L lateral thigh (operative side). No evidence for infection tracking deep to fascia, infected hardware, hardware failure, or septic hip.     - Appreciate IR recommendations  - Wound care consult  - Frequent repositioning, low pressure air mattress, offloading heel boots, caregiver training  - Abx per medicine/ID  - No acute orthopaedic surgical intervention indicated. We will sign off at this time. Please re-consult with questions.    Blanca Reilly PGY-2  Orthopaedic Surgery  [facility] [id number]  [facility] [id number]  [facility] [phone]      Patient seen and discussed with resident.  Films reviewed.  Agree with assessment and plan.   -ANTHONY Kramer

## 2022-03-28 NOTE — CONSULT NOTE ADULT - SUBJECTIVE AND OBJECTIVE BOX
Podiatry pager #: [phone] ([place])/ [phone] ([facility])    Patient is a 81y old  Female who presents with a chief complaint of hip ulcers and L heel blister (28 Mar 2022 12:20)      HPI:  81F with PMHx HTN, CVA w/o focal residual deficits, Alzheimer's dementia (AOx1 at baseline) who presents with worsening left heel wound. Per the patient's granddaughter, the patient has been at her baseline metantal status, however with a  worsening heel wound and bilateral hip ulcers/wounds on her body from not moving around. The heel wound is blister-like with fluid and not improving for the past month. She also has redness and drainage from the left hip ulcer. Pt has not been expressing pain from wounds but is a limited historian due to dementia. Patient is still able to move around when coaxed, but she has been more difficult to encourage lately. Grandaughter states that the patient has been warm to touch, but no documented fevers at home, no chills, shortness of breath, chest pain, cough, abdominal pain, N/V/D, or changes in urination.     Of note: Patient was recently admitted for lethargy in December. At that time, CTAP revealed right hip soft tissue defect with subcutaneous infiltration and edema of the right hip and medial gluteal subcutaneous tissues. Rim-enhancing collection overlying the left greater trochanter with adjacent subcutaneous edema. She was seen by orthopedics and IR for possible intervention, but ultimately no intervention was planned, as she clinically improved with Zosyn alone. Infectious disease ultimately believes the sepsis may have been due to a skin/soft tissue infection, and recommended to complete an additional 10 days of cephalexin at rehab.     Granddaughter at bedside states patient with regular diet and is at baseline mental status    ED course: 36.7C, 85bpm, 138/93, 100% on RA  - Vanc/zosyn given   - Bl Cx sent. urine Cx pending   - CT pelvis shows Open soft tissue abscess in the left thigh as above.  Additional inflammatory mass in the right thigh soft tissues with rim   enhancement and adjacent fat stranding, suspicious for developing abscess.   (27 Mar 2022 21:10)      PAST MEDICAL & SURGICAL HISTORY:  Dementia  AOx1 at baseline    Hypertension    HLD (hyperlipidemia)    CVA (cerebrovascular accident)  2006    S/P ORIF (open reduction internal fixation) fracture        MEDICATIONS  (STANDING):  enoxaparin Injectable 40 milliGRAM(s) SubCutaneous every 24 hours  influenza  Vaccine (HIGH DOSE) 0.7 milliLiter(s) IntraMuscular once  piperacillin/tazobactam IVPB.. 3.375 Gram(s) IV Intermittent every 8 hours  sodium chloride 0.45%. 1000 milliLiter(s) (75 mL/Hr) IV Continuous <Continuous>  vancomycin  IVPB 1000 milliGRAM(s) IV Intermittent every 24 hours    MEDICATIONS  (PRN):  acetaminophen     Tablet .. 650 milliGRAM(s) Oral every 6 hours PRN Temp greater or equal to 38C (100.4F), Mild Pain (1 - 3)      Allergies    No Known Drug Allergies  shellfish (Swelling)    Intolerances        VITALS:    Vital Signs Last 24 Hrs  T(C): 36.7 (28 Mar 2022 11:11), Max: 37.1 (28 Mar 2022 00:30)  T(F): 98 (28 Mar 2022 11:11), Max: 98.7 (28 Mar 2022 00:30)  HR: 92 (28 Mar 2022 11:11) (85 - 100)  BP: 118/81 (28 Mar 2022 11:11) (118/81 - 176/92)  BP(mean): --  RR: 17 (28 Mar 2022 11:11) (17 - 18)  SpO2: 100% (28 Mar 2022 11:11) (100% - 100%)    LABS:                          12.2   12.41 )-----------( 181      ( 28 Mar 2022 06:26 )             37.5       03-28    143  |  106  |  9   ----------------------------<  92  4.2   |  25  |  0.66    Ca    9.5      28 Mar 2022 06:26  Phos  4.1     03-28  Mg     2.20     03-28    TPro  6.8  /  Alb  3.4  /  TBili  0.3  /  DBili  x   /  AST  31  /  ALT  30  /  AlkPhos  101  03-27      CAPILLARY BLOOD GLUCOSE              LOWER EXTREMITY PHYSICAL EXAM:    Vascular: DP/PT 0/4 B/L, CFT <5 seconds B/L, Temperature gradient warm to cool B/L  Neuro: Epicritic sensation diminished to the level of heel B/L  Musculoskeletal/Ortho: unremarkable   Skin: left foot lateral heel serous filled blister, no erythema, no malodor, no probing wound, no acute signs of infection     RADIOLOGY & ADDITIONAL STUDIES:    < from: Xray Foot AP + Lateral + Oblique, Left (03.27.22 @ 15:08) >    ACC: [id number] EXAM:  XR FOOT COMP MIN 3 VIEWS LT                          PROCEDURE DATE:  03/27/2022          INTERPRETATION:  INDICATION: Blister at the back of the heel. Rule out   osteomyelitis.    TECHNIQUE: 3 views of the left foot    COMPARISON: None.    IMPRESSION: There is no radiographic evidence for osteomyelitis of the   left foot. No significant cortical irregularity or erosion is seen. No   subcutaneous gas is seen. If clinically warranted, an MRI of the foot may   be performed for confirmation.    --- End of Report ---            CHACE KING MD; Attending Radiologist  This document has been electronically signed. Mar 27 2022  3:19PM    < end of copied text >   Podiatry pager #: [phone] ([place])/ [phone] ([facility])    Patient is a 81y old  Female who presents with a chief complaint of hip ulcers and L heel blister (28 Mar 2022 12:20)      HPI:  81F with PMHx HTN, CVA w/o focal residual deficits, Alzheimer's dementia (AOx1 at baseline) who presents with worsening left heel wound. Per the patient's granddaughter, the patient has been at her baseline metantal status, however with a  worsening heel wound and bilateral hip ulcers/wounds on her body from not moving around. The heel wound is blister-like with fluid and not improving for the past month. She also has redness and drainage from the left hip ulcer. Pt has not been expressing pain from wounds but is a limited historian due to dementia. Patient is still able to move around when coaxed, but she has been more difficult to encourage lately. Grandaughter states that the patient has been warm to touch, but no documented fevers at home, no chills, shortness of breath, chest pain, cough, abdominal pain, N/V/D, or changes in urination.     Of note: Patient was recently admitted for lethargy in December. At that time, CTAP revealed right hip soft tissue defect with subcutaneous infiltration and edema of the right hip and medial gluteal subcutaneous tissues. Rim-enhancing collection overlying the left greater trochanter with adjacent subcutaneous edema. She was seen by orthopedics and IR for possible intervention, but ultimately no intervention was planned, as she clinically improved with Zosyn alone. Infectious disease ultimately believes the sepsis may have been due to a skin/soft tissue infection, and recommended to complete an additional 10 days of cephalexin at rehab.     Granddaughter at bedside states patient with regular diet and is at baseline mental status    ED course: 36.7C, 85bpm, 138/93, 100% on RA  - Vanc/zosyn given   - Bl Cx sent. urine Cx pending   - CT pelvis shows Open soft tissue abscess in the left thigh as above.  Additional inflammatory mass in the right thigh soft tissues with rim   enhancement and adjacent fat stranding, suspicious for developing abscess.   (27 Mar 2022 21:10)      PAST MEDICAL & SURGICAL HISTORY:  Dementia  AOx1 at baseline    Hypertension    HLD (hyperlipidemia)    CVA (cerebrovascular accident)  2006    S/P ORIF (open reduction internal fixation) fracture        MEDICATIONS  (STANDING):  enoxaparin Injectable 40 milliGRAM(s) SubCutaneous every 24 hours  influenza  Vaccine (HIGH DOSE) 0.7 milliLiter(s) IntraMuscular once  piperacillin/tazobactam IVPB.. 3.375 Gram(s) IV Intermittent every 8 hours  sodium chloride 0.45%. 1000 milliLiter(s) (75 mL/Hr) IV Continuous <Continuous>  vancomycin  IVPB 1000 milliGRAM(s) IV Intermittent every 24 hours    MEDICATIONS  (PRN):  acetaminophen     Tablet .. 650 milliGRAM(s) Oral every 6 hours PRN Temp greater or equal to 38C (100.4F), Mild Pain (1 - 3)      Allergies    No Known Drug Allergies  shellfish (Swelling)    Intolerances        VITALS:    Vital Signs Last 24 Hrs  T(C): 36.7 (28 Mar 2022 11:11), Max: 37.1 (28 Mar 2022 00:30)  T(F): 98 (28 Mar 2022 11:11), Max: 98.7 (28 Mar 2022 00:30)  HR: 92 (28 Mar 2022 11:11) (85 - 100)  BP: 118/81 (28 Mar 2022 11:11) (118/81 - 176/92)  BP(mean): --  RR: 17 (28 Mar 2022 11:11) (17 - 18)  SpO2: 100% (28 Mar 2022 11:11) (100% - 100%)    LABS:                          12.2   12.41 )-----------( 181      ( 28 Mar 2022 06:26 )             37.5       03-28    143  |  106  |  9   ----------------------------<  92  4.2   |  25  |  0.66    Ca    9.5      28 Mar 2022 06:26  Phos  4.1     03-28  Mg     2.20     03-28    TPro  6.8  /  Alb  3.4  /  TBili  0.3  /  DBili  x   /  AST  31  /  ALT  30  /  AlkPhos  101  03-27      CAPILLARY BLOOD GLUCOSE              LOWER EXTREMITY PHYSICAL EXAM:    Vascular: DP/PT 0/4 B/L, CFT <5 seconds B/L, Temperature gradient warm to cool B/L  Neuro: Epicritic sensation diminished to the level of heel B/L  Musculoskeletal/Ortho: unremarkable   Skin: left foot lateral heel serous filled blister, no erythema, no malodor, no probing wound, no acute signs of infection   right foot no open wounds or lesions     RADIOLOGY & ADDITIONAL STUDIES:    < from: Xray Foot AP + Lateral + Oblique, Left (03.27.22 @ 15:08) >    ACC: [id number] EXAM:  XR FOOT COMP MIN 3 VIEWS LT                          PROCEDURE DATE:  03/27/2022          INTERPRETATION:  INDICATION: Blister at the back of the heel. Rule out   osteomyelitis.    TECHNIQUE: 3 views of the left foot    COMPARISON: None.    IMPRESSION: There is no radiographic evidence for osteomyelitis of the   left foot. No significant cortical irregularity or erosion is seen. No   subcutaneous gas is seen. If clinically warranted, an MRI of the foot may   be performed for confirmation.    --- End of Report ---            CHACE KING MD; Attending Radiologist  This document has been electronically signed. Mar 27 2022  3:19PM    < end of copied text >

## 2022-03-29 ENCOUNTER — TRANSCRIPTION ENCOUNTER (OUTPATIENT)
Age: 82
End: 2022-03-29

## 2022-03-29 LAB
ANION GAP SERPL CALC-SCNC: 12 MMOL/L — SIGNIFICANT CHANGE UP (ref 7–14)
ANION GAP SERPL CALC-SCNC: 21 MMOL/L — HIGH (ref 7–14)
BUN SERPL-MCNC: 11 MG/DL — SIGNIFICANT CHANGE UP (ref 7–23)
BUN SERPL-MCNC: 7 MG/DL — SIGNIFICANT CHANGE UP (ref 7–23)
CALCIUM SERPL-MCNC: 8 MG/DL — LOW (ref 8.4–10.5)
CALCIUM SERPL-MCNC: 8.8 MG/DL — SIGNIFICANT CHANGE UP (ref 8.4–10.5)
CHLORIDE SERPL-SCNC: 105 MMOL/L — SIGNIFICANT CHANGE UP (ref 98–107)
CHLORIDE SERPL-SCNC: 87 MMOL/L — LOW (ref 98–107)
CO2 SERPL-SCNC: 21 MMOL/L — LOW (ref 22–31)
CO2 SERPL-SCNC: 25 MMOL/L — SIGNIFICANT CHANGE UP (ref 22–31)
CREAT SERPL-MCNC: 0.54 MG/DL — SIGNIFICANT CHANGE UP (ref 0.5–1.3)
CREAT SERPL-MCNC: 0.69 MG/DL — SIGNIFICANT CHANGE UP (ref 0.5–1.3)
CULTURE RESULTS: NO GROWTH — SIGNIFICANT CHANGE UP
EGFR: 87 ML/MIN/1.73M2 — SIGNIFICANT CHANGE UP
EGFR: 92 ML/MIN/1.73M2 — SIGNIFICANT CHANGE UP
GLUCOSE BLDC GLUCOMTR-MCNC: 88 MG/DL — SIGNIFICANT CHANGE UP (ref 70–99)
GLUCOSE SERPL-MCNC: 130 MG/DL — HIGH (ref 70–99)
GLUCOSE SERPL-MCNC: 617 MG/DL — CRITICAL HIGH (ref 70–99)
HCT VFR BLD CALC: 33.2 % — LOW (ref 34.5–45)
HGB BLD-MCNC: 11.2 G/DL — LOW (ref 11.5–15.5)
MAGNESIUM SERPL-MCNC: 1.9 MG/DL — SIGNIFICANT CHANGE UP (ref 1.6–2.6)
MAGNESIUM SERPL-MCNC: 2.2 MG/DL — SIGNIFICANT CHANGE UP (ref 1.6–2.6)
MCHC RBC-ENTMCNC: 29.4 PG — SIGNIFICANT CHANGE UP (ref 27–34)
MCHC RBC-ENTMCNC: 33.7 GM/DL — SIGNIFICANT CHANGE UP (ref 32–36)
MCV RBC AUTO: 87.1 FL — SIGNIFICANT CHANGE UP (ref 80–100)
NRBC # BLD: 0 /100 WBCS — SIGNIFICANT CHANGE UP
NRBC # FLD: 0 K/UL — SIGNIFICANT CHANGE UP
PHOSPHATE SERPL-MCNC: 3.6 MG/DL — SIGNIFICANT CHANGE UP (ref 2.5–4.5)
PHOSPHATE SERPL-MCNC: 3.9 MG/DL — SIGNIFICANT CHANGE UP (ref 2.5–4.5)
PLATELET # BLD AUTO: 186 K/UL — SIGNIFICANT CHANGE UP (ref 150–400)
POTASSIUM SERPL-MCNC: 4.4 MMOL/L — SIGNIFICANT CHANGE UP (ref 3.5–5.3)
POTASSIUM SERPL-MCNC: 4.9 MMOL/L — SIGNIFICANT CHANGE UP (ref 3.5–5.3)
POTASSIUM SERPL-SCNC: 4.4 MMOL/L — SIGNIFICANT CHANGE UP (ref 3.5–5.3)
POTASSIUM SERPL-SCNC: 4.9 MMOL/L — SIGNIFICANT CHANGE UP (ref 3.5–5.3)
RBC # BLD: 3.81 M/UL — SIGNIFICANT CHANGE UP (ref 3.8–5.2)
RBC # FLD: 15.4 % — HIGH (ref 10.3–14.5)
SODIUM SERPL-SCNC: 129 MMOL/L — LOW (ref 135–145)
SODIUM SERPL-SCNC: 142 MMOL/L — SIGNIFICANT CHANGE UP (ref 135–145)
SPECIMEN SOURCE: SIGNIFICANT CHANGE UP
VANCOMYCIN TROUGH SERPL-MCNC: 7.8 UG/ML — LOW (ref 10–20)
WBC # BLD: 10.74 K/UL — HIGH (ref 3.8–10.5)
WBC # FLD AUTO: 10.74 K/UL — HIGH (ref 3.8–10.5)

## 2022-03-29 PROCEDURE — 99221 1ST HOSP IP/OBS SF/LOW 40: CPT

## 2022-03-29 PROCEDURE — 99232 SBSQ HOSP IP/OBS MODERATE 35: CPT

## 2022-03-29 RX ORDER — ASCORBIC ACID 60 MG
500 TABLET,CHEWABLE ORAL DAILY
Refills: 0 | Status: DISCONTINUED | OUTPATIENT
Start: 2022-03-30 | End: 2022-04-07

## 2022-03-29 RX ADMIN — PIPERACILLIN AND TAZOBACTAM 25 GRAM(S): 4; .5 INJECTION, POWDER, LYOPHILIZED, FOR SOLUTION INTRAVENOUS at 18:24

## 2022-03-29 RX ADMIN — ENOXAPARIN SODIUM 30 MILLIGRAM(S): 100 INJECTION SUBCUTANEOUS at 08:15

## 2022-03-29 RX ADMIN — MUPIROCIN 1 APPLICATION(S): 20 OINTMENT TOPICAL at 12:18

## 2022-03-29 RX ADMIN — Medication 250 MILLIGRAM(S): at 17:09

## 2022-03-29 RX ADMIN — PIPERACILLIN AND TAZOBACTAM 25 GRAM(S): 4; .5 INJECTION, POWDER, LYOPHILIZED, FOR SOLUTION INTRAVENOUS at 02:22

## 2022-03-29 RX ADMIN — PIPERACILLIN AND TAZOBACTAM 25 GRAM(S): 4; .5 INJECTION, POWDER, LYOPHILIZED, FOR SOLUTION INTRAVENOUS at 09:33

## 2022-03-29 RX ADMIN — MUPIROCIN 1 APPLICATION(S): 20 OINTMENT TOPICAL at 05:13

## 2022-03-29 NOTE — DISCHARGE NOTE PROVIDER - CARE PROVIDER_API CALL
Ben Artis (DPM)  Podiatric Medicine  [address], [address]  [place] [address]  Phone: [phone]  Fax: [phone]  Follow Up Time:

## 2022-03-29 NOTE — CONSULT NOTE ADULT - ASSESSMENT
Assessment: 80 yo F with a history of HTN, CVA w/o focal residual deficits, Alzheimer's dementia (AOx1 at baseline) who presents with worsening left heel wound found to have a left thigh abscess and likely right thigh abscess.  Wound surgery consult requested to assist with management of bilateral trochanter, sacral pressure injuries. Possible drainage of left thigh abscess, and possible right thigh abscess.    Left trochanter unstagable pressure injury  -2mfm5jwt3.2cm, firmly attached eschar, scant seropurulent drainage from wound edge at 6 o'clock, no odor.  -Periwound skin with +edema, +erythema extending from wound edge, there is no fluctuance, or crepitus.  - Ct scan with open soft itssue abscess in the left thigh  - Wound is auto-draining, eschar is firmly attached, currently on Zosyn/Vanco, on Lovenox. No sharp debridement indicated at this time.   - Recommendations to initiate autolytic debridement with Medihoney and silicone foam daily.  - Will reassess for sharp debridement after treating with Medihoney first to soften eschar.  - Agree to continue treatement Abx, management per primary team.  - Of note evaluated by IR with no need for drainage catheter at this time.  - Continue to offload pressure.    Right trochanter deep tissue pressure injury  -9cnz5ifs8.2cm, mixed tissue type epidermal purple-maroon discoloration, skin slippage with darkened dermis.  -No erythema, no edema, no increased warmth; clinically with no s/s of cellulitis  -No fluctuance, no crepitus; no drainable collection at this time  -Apply Silicone foam with border, change daily.  -Offload pressure  -Agree with IR  repeat imaging in a few days to evaluate if area walls off.  -Continue to offload pressure    Sacral stage 2 pressure injury  - 9kuf0lyu7.2cm, pink dermis.   - Wound base clean  - Complicated by moisture, fecal and urinary incontinence.  - Apply TRIAD moisture barrier paste every shift and prn with episodes of incontinence  -Offload pressure.    Left heel blister  -management per Podiatry team  - continue use of complete cair boots b/l feet    Additional recommendations:  Abx per Medicine/ ID  Nutrition Consult for optimization as tolerated, bilateral trochanter, sacrum pressure injuries        consider MVI & Vit C to promote wound healing        encourage high quality protein when appropriate  Recommend Envella bed, multiple turning surfaced with pressure injuries    Remainder of care per primary team.  Will follow up periodically throughout hospitalization, please reconsult earlier if needed.    Upon discharge f/u as outpatient at [facility] [address] [place] [address] [phone]  Seen w/ Dr. Snyder, findings and plan discussed with primary team.    Thank you for this consult  SHABBIR Keyes, CWRAMÍREZN (pager #[phone])    If after 4PM or before 7:30AM on Mon-Friday or weekend/holiday please contact general surgery for urgent matters.   Team A- [id number]   Team B- [id number]  For non-urgent matters e-mail [email or web address]    We spent 70 minutes face to face with this patient of which more than 50% of the time was spent counseling & coordinating care of this pt

## 2022-03-29 NOTE — CONSULT NOTE ADULT - SUBJECTIVE AND OBJECTIVE BOX
Wound SURGERY CONSULT NOTE    HPI:  81F with PMHx HTN, CVA w/o focal residual deficits, Alzheimer's dementia (AOx1 at baseline) who presents with worsening left heel wound. Per the patient's granddaughter, the patient has been at her baseline metal status, however with a  worsening heel wound and bilateral hip ulcers/wounds on her body from not moving around. The heel wound is blister-like with fluid and not improving for the past month. She also has redness and drainage from the left hip ulcer. Pt has not been expressing pain from wounds but is a limited historian due to dementia. Patient is still able to move around when coaxed, but she has been more difficult to encourage lately. Grandaughter states that the patient has been warm to touch, but no documented fevers at home, no chills, shortness of breath, chest pain, cough, abdominal pain, N/V/D, or changes in urination.     Of note: Patient was recently admitted for lethargy in December. At that time, CTAP revealed right hip soft tissue defect with subcutaneous infiltration and edema of the right hip and medial gluteal subcutaneous tissues. Rim-enhancing collection overlying the left greater trochanter with adjacent subcutaneous edema. She was seen by orthopedics and IR for possible intervention, but ultimately no intervention was planned, as she clinically improved with Zosyn alone. Infectious disease ultimately believes the sepsis may have been due to a skin/soft tissue infection, and recommended to complete an additional 10 days of cephalexin at rehab.     Granddaughter at bedside states patient with regular diet and is at baseline mental status    ED course: 36.7C, 85bpm, 138/93, 100% on RA  - Vanc/zosyn given   - Bl Cx sent. urine Cx pending   - CT pelvis shows Open soft tissue abscess in the left thigh as above.  Additional inflammatory mass in the right thigh soft tissues with rim   enhancement and adjacent fat stranding, suspicious for developing abscess.   (27 Mar 2022 21:10)    Wound consult requested to assist w/ management of bilateral trochanter and sacral pressure injuries.    Current Diet: Diet, DASH/TLC:   Sodium & Cholesterol Restricted  Soft and Bite Sized (SOFTBTSZ) (03-28-22 @ 12:24)      PAST MEDICAL & SURGICAL HISTORY:  Dementia  AOx1 at baseline    Hypertension    HLD (hyperlipidemia)    CVA (cerebrovascular accident)  2006    S/P ORIF (open reduction internal fixation) fracture        REVIEW OF SYSTEMS: Pt unable to offer  General/ Breast/ Skin/ Neuro/ MSK: see HPI  All other systems negative    MEDICATIONS  (STANDING):  enoxaparin Injectable 30 milliGRAM(s) SubCutaneous every 24 hours  influenza  Vaccine (HIGH DOSE) 0.7 milliLiter(s) IntraMuscular once  mupirocin 2% Ointment 1 Application(s) Topical two times a day  piperacillin/tazobactam IVPB.. 3.375 Gram(s) IV Intermittent every 8 hours  vancomycin  IVPB 1000 milliGRAM(s) IV Intermittent every 24 hours    MEDICATIONS  (PRN):  acetaminophen     Tablet .. 650 milliGRAM(s) Oral every 6 hours PRN Temp greater or equal to 38C (100.4F), Mild Pain (1 - 3)      Allergies    No Known Drug Allergies  shellfish (Swelling)    Intolerances        SOCIAL HISTORY:  Patient has been dependent on caregivers and family since stroke in 2006. Lives with daughters (alternated between two, one month at a time) and has 12 hour/day support. Hx of alcohol use, but nothing since stroke. No smoking/drug use.         FAMILY HISTORY:  No pertinent family history in first degree relatives        PHYSICAL EXAM:  Vital Signs Last 24 Hrs  T(C): 36.6 (29 Mar 2022 10:00), Max: 36.9 (28 Mar 2022 22:00)  T(F): 97.9 (29 Mar 2022 10:00), Max: 98.5 (29 Mar 2022 02:00)  HR: 94 (29 Mar 2022 10:00) (94 - 95)  BP: 138/80 (29 Mar 2022 10:00) (138/80 - 151/84)  BP(mean): --  RR: 18 (29 Mar 2022 10:00) (18 - 18)  SpO2: 100% (29 Mar 2022 10:00) (100% - 100%)  Wt: 45.2kg (28 Mar 2022)  BMI: 18.2 kg/m2 (28 Mar 2022)    Constitutional: NAD, minimal verbal interaction, yelling out during t&P  cachectic, frail  (+) low airloss support surface.   (+) fluidized positioning devices   (+) complete cair boots  HEENT:  NC/AT, PERRL, EOMI, mucosa moist, grinding/clenching teeth  Cardiovascular: rate regular   Respiratory: non labored, room air  Gastrointestinal: soft NT/ND, fecal incontinece  : urinary incontinence.  Neurology: unable to follow commands  Musculoskeletal:  limited, requires 2 person assist for T&P, no deformities or contractures.  Vascular: Left foot dressing c/d/i  Right foot without ischemic changes, no wounds noted.  Skin:    Left trochanter- unstagable pressure injury- 0uiq3fjj5.1cm, firmly attached, minimally moist eschar. No fluctuance, no crepitus noted. Scant sero-purulent drainage from wound edge at 6 o'clock. Periwound skin with erythema, edema extending 3cm from wound edge. No increased warmth, non-fluctuant. Sharp debridement not indicated at this time. Medihoney, silicone foam with border applied.  Right trochanter- deep tissue pressure injury- 8qho6bny8.2cm- mixed tissue type, epidermal purple maroon discoloration, skin slippage exposing darkened dermis. No edema, no erythema, on increased warmth noted. No drainable abscess.  Sacrum- stage 2 pressure injury complicated by moisture and incontinence associated dermatitis- 4cmm0mjy5.2cm, dermis exposed. No s/s of acute skin infection/cellulitis.    LABS/ CULTURES/ RADIOLOGY:                        11.2   10.74 )-----------( 186      ( 29 Mar 2022 07:16 )             33.2       129  |  87  |  7   ----------------------------<  617      [03-29-22 @ 07:16]        Ca     8.0     [03-29-22 @ 07:16]      Mg     1.90     [03-29-22 @ 07:16]      Phos  3.6     [03-29-22 @ 07:16]    TPro  6.8  /  Alb  3.4  /  TBili  0.3  /  DBili  x   /  AST  31  /  ALT  30  /  AlkPhos  101  [03-27-22 @ 14:59]        Culture - Blood (collected 03-27-22 @ 18:39)  Source: .Blood Blood  Preliminary Report (03-28-22 @ 19:01):    No growth to date.    Culture - Blood (collected 03-27-22 @ 18:39)  Source: .Blood Blood  Preliminary Report (03-28-22 @ 19:01):    No growth to date.      Culture - Urine (03.28.22 @ 07:27)   Specimen Source: Catheterized Catheterized   Culture Results:   No growth     < from: CT Pelvis w/ IV Cont (03.27.22 @ 17:23) >  ACC: [id number] EXAM:  CT PELVIS ONLY IC                          PROCEDURE DATE:  03/27/2022          INTERPRETATION:  CLINICAL INFORMATION: 81-year-old female with dementia   and left hip ulceration. History  bilateral hip soft tissue infection   December 2021    COMPARISON: Pelvic x-ray from 12/8/2021. CT 12/06/2021   CONTRAST/COMPLICATIONS:  IV Contrast: Omnipaque 350  90 cc administered   10 cc discarded  Oral Contrast: NONE  Complications: None reported at time of study completion    PROCEDURE:  CT of the Pelvis was performed.  Sagittal and coronal reformats were performed.    FINDINGS:  BLADDER: Within normal limits.  REPRODUCTIVE ORGANS: Retroflexed uterus with large calcified fibroid  LYMPH NODES: No pelvic lymphadenopathy.    VISUALIZED PORTIONS:  ABDOMINAL ORGANS: Cholelithiasis.  BOWEL: Distended rectum with feces  PERITONEUM: No ascites.  VESSELS: Within normal limits.  ABDOMINAL WALL: Left lateral thigh soft tissue defect with adjacent   rim-enhancing collection of fluid andlocules of gas with associated soft   tissue infiltration, compatible with draining abscess. In the right thigh   soft tissues there is a heterogeneous inflammatory mass measuring 3.6 x   1.5 x 2.3 cm with rim enhancement which may represent phlegmonand a site   of fat necrosis (300-79).  BONES: Right hip ORIF. Asymmetric sclerosis of the right acetabulum.   Degenerative changes of the spine.    IMPRESSION:    Open soft tissue abscess in the left thigh as above.  Additional inflammatory mass in the right thigh soft tissues with rim   enhancement and adjacent fat stranding, suspicious for developing abscess.    --- End of Report ---          JOANNE DAWKINS MD; Resident Radiologist  This document has been electronically signed.  VALDO MIR MD; Attending Radiologist  This document has been electronically signed. Mar 27 2022  6:32PM    < end of copied text >

## 2022-03-29 NOTE — CONSULT NOTE ADULT - SUBJECTIVE AND OBJECTIVE BOX
HPI: Ms. Dunaway is an 81 year-old woman with history of multiple medical issues including hypertension, stroke, and Alzheimer's dementia (A+Ox1 at baseline) who presented 3/27/22 with a worsening left heel wound, as well as bilateral hip ulcers in association with immobility. IN the ER, she received IV Vancomycin and Zosyn. She underwent CT pelvis, demonstrating an open soft tissue abscess of the left thigh, +/- developing right thigh abscess.     Ms. Dunaway was noted on admission labwork on 3/27/22 to have a serum sodium of 149meq/L; she was therefore placed on hypotonic IVF (1/2NS 75cc/h x 16h). Yesterday her sodium was down to 143meq/L; today it is down to 129meq/L (?). In light of this, a renal consultation was requested.      PAST MEDICAL & SURGICAL HISTORY:  Alzheimer's Dementia -A&Ox1 at baseline  Hypertension  HLD (hyperlipidemia)  CVA (cerebrovascular accident) 2006  S/P ORIF (open reduction internal fixation) fracture    Allergies  shellfish (Swelling)    SOCIAL HISTORY:  Denies ETOh,Smoking,     FAMILY HISTORY:  No pertinent family history in first degree relatives    REVIEW OF SYSTEMS:  CONSTITUTIONAL: No weakness, fevers or chills  EYES/ENT: No visual changes;  No vertigo or throat pain   NECK: No pain or stiffness  RESPIRATORY: No cough, wheezing, hemoptysis; No shortness of breath  CARDIOVASCULAR: No chest pain or palpitations  GASTROINTESTINAL: No abdominal or epigastric pain. No nausea, vomiting, or hematemesis; No diarrhea or constipation. No melena or hematochezia.  GENITOURINARY: No dysuria, frequency or hematuria  NEUROLOGICAL: (+)weakness-generalized  SKIN: (+)L heel ulcer and b/l hip ulcers-associated redness, drainage, and pain  All other review of systems is negative unless indicated above.    VITAL:  T(C): , Max: 36.9 (03-28-22 @ 22:00)  T(F): , Max: 98.5 (03-29-22 @ 02:00)  HR: 95 (03-29-22 @ 06:00)  BP: 143/81 (03-29-22 @ 06:00)  RR: 18 (03-29-22 @ 06:00)  SpO2: 100% (03-29-22 @ 06:00)    PHYSICAL EXAM:  Constitutional: NAD, Alert  HEENT: NCAT, MMM  Neck: Supple, No JVD  Respiratory: CTA-b/l  Cardiovascular: RRR s1s2, no m/r/g  Gastrointestinal: BS+, soft, NT/ND  Extremities: No peripheral edema b/l  Neurological: no focal deficits; strength grossly intact  Back: no CVAT b/l  Skin: No rashes, no nevi    LABS:                        11.2   10.74 )-----------( 186      ( 29 Mar 2022 07:16 )             33.2     Na(129)/K(4.9)/Cl(87)/HCO3(21)/BUN(7)/Cr(0.54)Glu(617)/Ca(8.0)/Mg(1.90)/PO4(3.6)    03-29 @ 07:16  Na(143)/K(4.2)/Cl(106)/HCO3(25)/BUN(9)/Cr(0.66)Glu(92)/Ca(9.5)/Mg(2.20)/PO4(4.1)    03-28 @ 06:26  Na(149)/K(4.4)/Cl(111)/HCO3(26)/BUN(15)/Cr(0.69)Glu(101)/Ca(9.1)/Mg(2.30)/PO4(--)    03-27 @ 14:59    Urinalysis Basic - ( 28 Mar 2022 03:39 )  Color: Light Yellow / Appearance: Clear / SG: >1.050 / pH: x  Gluc: x / Ketone: Negative  / Bili: Negative / Urobili: <2 mg/dL   Blood: x / Protein: Trace / Nitrite: Negative   Leuk Esterase: Large / RBC: <5 /HPF / WBC 5-10 /HPF   Sq Epi: x / Non Sq Epi: 1 /HPF / Bacteria: Occasional    IMAGING:  < from: Xray Pelvis AP only (03.28.22 @ 10:31) >  Limited secondary to positioning. Right femoral short intramedullary mohini   with proximal compression and distal interlocking screw. Hardware   alignment does not appear significantly changed when compared with prior   radiograph. Right hip joint space is grossly maintained. There is   partially imaged moderate to severe tricompartmental knee arthrosis.   Calcific density projects over the pelvis.      ASSESSMENT:  (1)Hyponatremia - largely dilutional, in association with hyperglycemia. After correcting for glucose, the serum sodium from this a.m. is 137-141meq/L (highly acceptable)    (2)Renal - grossly intact function    (3)ID - thigh abscess(es) - on IV Zosyn/Vanco    RECOMMEND:  (1)No further IVF for now  (2)Glycemic control per primary team  (3)Dose new meds for GFR >60ml/min    Thank you for involving [facility] in this patient's care.    With warm regards,    Zane Dale MD   [facility]  Office: [phone]  Cell: [phone]               HPI: Ms. Dunaway is an 81 year-old woman with history of multiple medical issues including hypertension, stroke, and Alzheimer's dementia (A+Ox1 at baseline) who presented 3/27/22 with a worsening left heel wound, as well as bilateral hip ulcers in association with immobility. IN the ER, she received IV Vancomycin and Zosyn. She underwent CT pelvis, demonstrating an open soft tissue abscess of the left thigh, +/- developing right thigh abscess.     Ms. Dunaway was noted on admission labwork on 3/27/22 to have a serum sodium of 149meq/L; she was therefore placed on hypotonic IVF (1/2NS 75cc/h x 16h). Yesterday her sodium was down to 143meq/L; today it is down to 129meq/L (?). In light of this, a renal consultation was requested.    Discussed with staff - she had received Vanco in D5W earlier this a.m....    PAST MEDICAL & SURGICAL HISTORY:  Alzheimer's Dementia -A&Ox1 at baseline  Hypertension  HLD (hyperlipidemia)  CVA (cerebrovascular accident) 2006  S/P ORIF (open reduction internal fixation) fracture    Allergies  shellfish (Swelling)    SOCIAL HISTORY:  Denies ETOh,Smoking,     FAMILY HISTORY:  No pertinent family history in first degree relatives    REVIEW OF SYSTEMS:  CONSTITUTIONAL: No fevers or chills  EYES/ENT: No visual changes;  No vertigo or throat pain   NECK: No pain or stiffness  RESPIRATORY: No cough, wheezing, hemoptysis; No shortness of breath  CARDIOVASCULAR: No chest pain or palpitations  GASTROINTESTINAL: No abdominal or epigastric pain. No nausea, vomiting, or hematemesis; No diarrhea or constipation. No melena or hematochezia.  GENITOURINARY: No dysuria, frequency or hematuria  NEUROLOGICAL: (+)weakness-generalized  SKIN: (+)L heel ulcer and b/l hip ulcers-associated redness, drainage, and pain  All other review of systems is negative unless indicated above.    VITAL:  T(C): , Max: 36.9 (03-28-22 @ 22:00)  T(F): , Max: 98.5 (03-29-22 @ 02:00)  HR: 95 (03-29-22 @ 06:00)  BP: 143/81 (03-29-22 @ 06:00)  RR: 18 (03-29-22 @ 06:00)  SpO2: 100% (03-29-22 @ 06:00)    PHYSICAL EXAM:  Constitutional: frail; not answering simple questions appropriately and not following simple commands  HEENT: NCAT, DMM  Neck: Supple, No JVD  Respiratory: CTA-b/l  Cardiovascular: RRR s1s2, no m/r/g  Gastrointestinal: BS+, soft, NT/ND  Extremities: No peripheral edema b/l; (+)b/l heel pads  Neurological: no focal deficits; strength grossly intact  Back: no CVAT b/l  Skin: No rashes, no nevi    LABS:                        11.2   10.74 )-----------( 186      ( 29 Mar 2022 07:16 )             33.2     Na(129)/K(4.9)/Cl(87)/HCO3(21)/BUN(7)/Cr(0.54)Glu(617)/Ca(8.0)/Mg(1.90)/PO4(3.6)    03-29 @ 07:16  Na(143)/K(4.2)/Cl(106)/HCO3(25)/BUN(9)/Cr(0.66)Glu(92)/Ca(9.5)/Mg(2.20)/PO4(4.1)    03-28 @ 06:26  Na(149)/K(4.4)/Cl(111)/HCO3(26)/BUN(15)/Cr(0.69)Glu(101)/Ca(9.1)/Mg(2.30)/PO4(--)    03-27 @ 14:59    Urinalysis Basic - ( 28 Mar 2022 03:39 )  Color: Light Yellow / Appearance: Clear / SG: >1.050 / pH: x  Gluc: x / Ketone: Negative  / Bili: Negative / Urobili: <2 mg/dL   Blood: x / Protein: Trace / Nitrite: Negative   Leuk Esterase: Large / RBC: <5 /HPF / WBC 5-10 /HPF   Sq Epi: x / Non Sq Epi: 1 /HPF / Bacteria: Occasional    IMAGING:  < from: Xray Pelvis AP only (03.28.22 @ 10:31) >  Limited secondary to positioning. Right femoral short intramedullary mohini   with proximal compression and distal interlocking screw. Hardware   alignment does not appear significantly changed when compared with prior   radiograph. Right hip joint space is grossly maintained. There is   partially imaged moderate to severe tricompartmental knee arthrosis.   Calcific density projects over the pelvis.      ASSESSMENT:  (1)Hyponatremia - largely dilutional, in association with hyperglycemia. After correcting for glucose, the serum sodium from this a.m. is 137-141meq/L (highly acceptable). Hyperglycemia likely due to drawing blood from arm receiving IV Vanco in D5W    (2)Renal - grossly intact function    (3)ID - thigh abscess(es) - on IV Zosyn/Vanco    RECOMMEND:  (1)No further IVF for now  (2)Glycemic control per primary team  (3)Dose new meds for GFR >60ml/min    Thank you for involving [facility] in this patient's care.    With warm regards,    Zane Dale MD   [facility]  Office: [phone]  Cell: [phone]

## 2022-03-29 NOTE — PROGRESS NOTE ADULT - SUBJECTIVE AND OBJECTIVE BOX
Follow Up:  decubiti, left thigh abscess    Interval History/ROS: minimal verbal.  patient evaluated by wound care service today.    Allergies  No Known Drug Allergies  shellfish (Swelling)        ANTIMICROBIALS:  piperacillin/tazobactam IVPB.. 3.375 every 8 hours  vancomycin  IVPB 1000 every 24 hours      OTHER MEDS:  acetaminophen     Tablet .. 650 milliGRAM(s) Oral every 6 hours PRN  enoxaparin Injectable 30 milliGRAM(s) SubCutaneous every 24 hours  influenza  Vaccine (HIGH DOSE) 0.7 milliLiter(s) IntraMuscular once  mupirocin 2% Ointment 1 Application(s) Topical two times a day      Vital Signs Last 24 Hrs  T(C): 36.4 (29 Mar 2022 14:00), Max: 36.9 (28 Mar 2022 22:00)  T(F): 97.6 (29 Mar 2022 14:00), Max: 98.5 (29 Mar 2022 02:00)  HR: 89 (29 Mar 2022 14:00) (89 - 95)  BP: 141/83 (29 Mar 2022 14:00) (138/80 - 151/84)  BP(mean): --  RR: 18 (29 Mar 2022 14:00) (18 - 18)  SpO2: 100% (29 Mar 2022 14:00) (100% - 100%)    PHYSICAL EXAM:  Constitutional: Not in acute distress  Eyes: No icterus.  Oral cavity: Clear, no lesions  RS: no respiratory distress on RA  CVS: S1, S2   Abdomen: Soft. No guarding/rigidity/tenderness.  : no villanueva  Extremities: right hip shallow ulcer no surrounding erythema left hip wound unable to assess (assessed yesterday)  Skin: No rash  Vascular: No evidence of phlebitis  Neuro: Alert, minimal verbal                          11.2   10.74 )-----------( 186      ( 29 Mar 2022 07:16 )             33.2       03-29    142  |  105  |  11  ----------------------------<  130<H>  4.4   |  25  |  0.69    Ca    8.8      29 Mar 2022 16:13  Phos  3.9     03-29  Mg     2.20     03-29        Urinalysis Basic - ( 28 Mar 2022 03:39 )    Color: Light Yellow / Appearance: Clear / SG: >1.050 / pH: x  Gluc: x / Ketone: Negative  / Bili: Negative / Urobili: <2 mg/dL   Blood: x / Protein: Trace / Nitrite: Negative   Leuk Esterase: Large / RBC: <5 /HPF / WBC 5-10 /HPF   Sq Epi: x / Non Sq Epi: 1 /HPF / Bacteria: Occasional        MICROBIOLOGY:  Vancomycin Level, Trough: 7.8 ug/mL (03-29-22 @ 16:13)  v  Catheterized Catheterized  03-28-22   No growth  --  --      .Blood Blood  03-27-22   No growth to date.  --  --        RADIOLOGY:    rd< from: CT Pelvis w/ IV Cont (03.27.22 @ 17:23) >  ACC: [id number] EXAM:  CT PELVIS ONLY IC                          PROCEDURE DATE:  03/27/2022          INTERPRETATION:  CLINICAL INFORMATION: 81-year-old female with dementia   and left hip ulceration. History  bilateral hip soft tissue infection   December 2021    COMPARISON: Pelvic x-ray from 12/8/2021. CT 12/06/2021   CONTRAST/COMPLICATIONS:  IV Contrast: Omnipaque 350  90 cc administered   10 cc discarded  Oral Contrast: NONE  Complications: None reported at time of study completion    PROCEDURE:  CT of the Pelvis was performed.  Sagittal and coronal reformats were performed.    FINDINGS:  BLADDER: Within normal limits.  REPRODUCTIVE ORGANS: Retroflexed uterus with large calcified fibroid  LYMPH NODES: No pelvic lymphadenopathy.    VISUALIZED PORTIONS:  ABDOMINAL ORGANS: Cholelithiasis.  BOWEL: Distended rectum with feces  PERITONEUM: No ascites.  VESSELS: Within normal limits.  ABDOMINAL WALL: Left lateral thigh soft tissue defect with adjacent   rim-enhancing collection of fluid andlocules of gas with associated soft   tissue infiltration, compatible with draining abscess. In the right thigh   soft tissues there is a heterogeneous inflammatory mass measuring 3.6 x   1.5 x 2.3 cm with rim enhancement which may represent phlegmonand a site   of fat necrosis (300-79).  BONES: Right hip ORIF. Asymmetric sclerosis of the right acetabulum.   Degenerative changes of the spine.    IMPRESSION:    Open soft tissue abscess in the left thigh as above.  Additional inflammatory mass in the right thigh soft tissues with rim   enhancement and adjacent fat stranding, suspicious for developing abscess.    --- End of Report ---          JOANNE DAWKINS MD; Resident Radiologist  This document has been electronically signed.  VALDO MIR MD; Attending Radiologist  This document has been electronically signed. Mar 27 2022  6:32PM    < end of copied text >

## 2022-03-29 NOTE — DIETITIAN INITIAL EVALUATION ADULT. - OTHER INFO
Pt 82 yo female with PMHx of HTN, CVA w/o focal residual deficits, Alzheimer's dementia presented with worsening left heel wound found to have a left thigh abscess and likely right thigh abscess - per chart review.     At time of visit, Pt awake, appears disoriented. Private aide at bed side answered questions during interview. Per aide, Pt eats well; Pt needs to be fed. Of note, Pt passed Swallow Bedside Assessment Adult, SLP rec: Soft & bite sized with thin liquids (3/28). No report of nausea, vomiting or diarrhea @ this time. +BM (3/28) fecal incontinence, per flow sheet.     Unable to discus weight history @ present. Of note Pt's weights: 45.2 kg (3/28), 55 kg (12/7/21) -->> weight loss of 9.8 kg ~3.5 months (weight change: ~17.8% x 3.5 months). Rec to add PO supplement: Ensure Enlive - 2x daily to diet rx. Of note, Pt with multiple pressure injuries. Rec to add No Carb Prosource (1 pkg = 15 gms Protein) Qty per day: 3 to diet rx to aide in wound healing. Case discussed with nurse. RDN remains available.

## 2022-03-29 NOTE — CHART NOTE - NSCHARTNOTEFT_GEN_A_CORE
23 hour Vancomycin trough resulted at 7.8.  Given patients small weight and low suspicion for severe MRSA infection at this time, will hold off on increasing vanc dosing.  Continue current dosing for now.  Above discussed with ID attending Dr. Dunbar.   Patient currently stable, will continue to monitor.

## 2022-03-29 NOTE — PROGRESS NOTE ADULT - SUBJECTIVE AND OBJECTIVE BOX
Podiatry pager #: [phone] ([place])/ [phone] ([facility])    Patient is a 81y old  Female who presents with a chief complaint of hip ulcers and L heel blister (28 Mar 2022 18:25)       INTERVAL HPI/OVERNIGHT EVENTS:  Patient seen and evaluated at bedside.  Pt is resting comfortable in NAD. Denies N/V/F/C.     Allergies    No Known Drug Allergies  shellfish (Swelling)    Intolerances        Vital Signs Last 24 Hrs  T(C): 36.9 (29 Mar 2022 06:00), Max: 36.9 (28 Mar 2022 22:00)  T(F): 98.5 (29 Mar 2022 06:00), Max: 98.5 (29 Mar 2022 02:00)  HR: 95 (29 Mar 2022 06:00) (92 - 95)  BP: 143/81 (29 Mar 2022 06:00) (118/81 - 151/84)  BP(mean): --  RR: 18 (29 Mar 2022 06:00) (17 - 18)  SpO2: 100% (29 Mar 2022 06:00) (100% - 100%)    LABS:                        11.2   10.74 )-----------( 186      ( 29 Mar 2022 07:16 )             33.2     03-29    129<L>  |  87<L>  |  7   ----------------------------<  617<HH>  4.9   |  21<L>  |  0.54    Ca    8.0<L>      29 Mar 2022 07:16  Phos  3.6     03-29  Mg     1.90     03-29    TPro  6.8  /  Alb  3.4  /  TBili  0.3  /  DBili  x   /  AST  31  /  ALT  30  /  AlkPhos  101  03-27      Urinalysis Basic - ( 28 Mar 2022 03:39 )    Color: Light Yellow / Appearance: Clear / SG: >1.050 / pH: x  Gluc: x / Ketone: Negative  / Bili: Negative / Urobili: <2 mg/dL   Blood: x / Protein: Trace / Nitrite: Negative   Leuk Esterase: Large / RBC: <5 /HPF / WBC 5-10 /HPF   Sq Epi: x / Non Sq Epi: 1 /HPF / Bacteria: Occasional      CAPILLARY BLOOD GLUCOSE          Lower Extremity Physical Exam:  Vascular: DP/PT 0/4 B/L, CFT <5 seconds B/L, Temperature gradient warm to cool B/L  Neuro: Epicritic sensation diminished to the level of heel B/L  Musculoskeletal/Ortho: unremarkable   Skin: left foot lateral heel serous filled blister, no erythema, no malodor, no probing wound, no acute signs of infection   right foot no open wounds or lesions     RADIOLOGY & ADDITIONAL TESTS:

## 2022-03-29 NOTE — DISCHARGE NOTE PROVIDER - NSDCMRMEDTOKEN_GEN_ALL_CORE_FT
carvedilol 3.125 mg oral tablet: 1 tab(s) orally 2 times a day  donepezil 10 mg oral tablet: 1 tab(s) orally once a day (at bedtime)  fluPHENAZine 1 mg oral tablet: 0.5 tab(s) orally once a day  simvastatin 40 mg oral tablet: 1 tab(s) orally once a day (at bedtime)   acetaminophen 325 mg oral tablet: 2 tab(s) orally every 6 hours, As needed, Temp greater or equal to 38C (100.4F), Mild Pain (1 - 3)  ascorbic acid 500 mg oral tablet: 1 tab(s) orally once a day  carvedilol 3.125 mg oral tablet: 1 tab(s) orally 2 times a day  donepezil 10 mg oral tablet: 1 tab(s) orally once a day (at bedtime)  fluPHENAZine 1 mg oral tablet: 0.5 tab(s) orally once a day  Multiple Vitamins oral tablet: 1 tab(s) orally once a day  simvastatin 40 mg oral tablet: 1 tab(s) orally once a day (at bedtime)   acetaminophen 325 mg oral tablet: 2 tab(s) orally every 6 hours, As needed, Temp greater or equal to 38C (100.4F), Mild Pain (1 - 3)  ascorbic acid 500 mg oral tablet: 1 tab(s) orally once a day  carvedilol 3.125 mg oral tablet: 1 tab(s) orally 2 times a day  donepezil 10 mg oral tablet: 1 tab(s) orally once a day (at bedtime)  fluPHENAZine 1 mg oral tablet: 0.5 tab(s) orally once a day  Multiple Vitamins oral tablet: 1 tab(s) orally once a day  mupirocin 2% topical ointment: 1 application topically 2 times a day  simvastatin 40 mg oral tablet: 1 tab(s) orally once a day (at bedtime)   acetaminophen 325 mg oral tablet: 2 tab(s) orally every 6 hours, As needed, Temp greater or equal to 38C (100.4F), Mild Pain (1 - 3)   acetaminophen 325 mg oral tablet: 2 tab(s) orally every 6 hours, As needed, Temp greater or equal to 38C (100.4F), Mild Pain (1 - 3)  ascorbic acid 500 mg oral tablet: 1 tab(s) orally once a day  donepezil 10 mg oral tablet: 1 tab(s) orally once a day (at bedtime)  fluPHENAZine 1 mg oral tablet: 0.5 tab(s) orally once a day -for chest pain   Multiple Vitamins oral tablet: 1 tab(s) orally once a day  mupirocin 2% topical ointment: 1 application topically 2 times a day  simvastatin 40 mg oral tablet: 1 tab(s) orally once a day (at bedtime)

## 2022-03-29 NOTE — DIETITIAN INITIAL EVALUATION ADULT. - ADD RECOMMEND
1. Encourage & assist Pt with meals; Monitor PO diet tolerance; Honor food preferences;    2. Add Multivitamins 1 tab daily for micronutrient coverage;       3. Monitor labs, hydration status;

## 2022-03-29 NOTE — CONSULT NOTE ADULT - CONSULT REASON
Hyponatremia
Left trochanter unstagable pressure injury POA   Right trochanter deep tissue pressure injury POA  Sacrum stage 2 pressure injury POA
bilateral thigh abscess
r/o R hip hardware infection
Thigh abscesses drainage
left heel blister

## 2022-03-29 NOTE — DISCHARGE NOTE PROVIDER - HOSPITAL COURSE
81F h/o  HTN, CVA w/o focal residual deficits, Alzheimer's dementia (AOx1 at baseline) who presents with worsening left heel wound found to have a left thigh abscess and likely right thigh abscess    Thigh abscess.   - Presented meeting sepsis criteria including tachycardia to 100 and leukocytosis with thigh abscesses noted on imaging; Lactate normal  - CT open soft tissue abscess in the left thigh. Additional inflammatory mass in the right thigh soft tissues with rim enhancement suspicious for developing abscess.  - Thigh abscesses likely bilateral and cannot r/o OM - pt is at risk for due to worsening pressure ulcers chronically and hx abscesses. Inflammatory markers elevated   - Not clear at this time if there is concern for right ORIF hardware infection. Consulted ortho to review imaging - No intervention at this time   - Blood Cx NGTD  - IR consult placed to evaluate for drainage of abscesses.  - Thigh abscess on zosyn till 4/6 total 10 days   - Wound care - L thigh wound no sharp debridement at this time, on medihoney    Wound care  - L hip un-stageable, TTP, erythema, no drainage. Eschar base  - R hip not draining. Also TTP, no surrounding erythema  - L shin erythematous  - L heel with large blister XR no OM; Per podiatry local wound care no MRI needed    Dementia baseline AOx1; Assistance w/ ADLs; Continue Donepezil 10mg daily.    Hx of stroke in 2006, on statin; HTN/HLD continued on Coreg     Dispo  - Rehab 81F h/o  HTN, CVA w/o focal residual deficits, Alzheimer's dementia (AOx1 at baseline) who presents with worsening left heel wound found to have a left thigh abscess and likely right thigh abscess    Thigh abscess.   - Presented meeting sepsis criteria including tachycardia to 100 and leukocytosis with thigh abscesses noted on imaging; Lactate normal  - CT open soft tissue abscess in the left thigh. Additional inflammatory mass in the right thigh soft tissues with rim enhancement suspicious for developing abscess.  - Thigh abscesses likely bilateral and cannot r/o OM - pt is at risk for due to worsening pressure ulcers chronically and hx abscesses. Inflammatory markers elevated   - Not clear at this time if there is concern for right ORIF hardware infection. Consulted ortho to review imaging - No intervention at this time   - Blood Cx NGTD  - IR consult placed to evaluate for drainage of abscesses.  - Thigh abscess on zosyn till 4/6 total 10 days   - Wound care - L thigh wound no sharp debridement at this time, on medihoney    Wound care  - L hip un-stageable, TTP, erythema, no drainage. Eschar base  - R hip not draining. Also TTP, no surrounding erythema  - L shin erythematous  - L heel with large blister XR no OM; Per podiatry local wound care no MRI needed    Dementia baseline AOx1; Assistance w/ ADLs; Continue Donepezil 10mg daily.    Hx of stroke in 2006, on statin; HTN/HLD continued on Coreg     Dispo  - Rehab     On 4/7/22 Case discussed with Dr Garduno and medications reviewed. Patient stable for discharge home.

## 2022-03-29 NOTE — DISCHARGE NOTE PROVIDER - NSDCFUADDAPPT_GEN_ALL_CORE_FT
Podiatry Discharge Instructions:  Follow up: Please follow up with Dr. Artis within 1 week of discharge from the hospital, please call [phone] for appointment and discuss that you recently were seen in the hospital.  Wound Care: Please apply mupirocin to left heel blister daily, dress w/ allevyn pad.   Weight bearing: Please wear z-flow offloading boots on both feet at all times while in bed.

## 2022-03-29 NOTE — DISCHARGE NOTE PROVIDER - NSDCCPCAREPLAN_GEN_ALL_CORE_FT
PRINCIPAL DISCHARGE DIAGNOSIS  Diagnosis: Thigh abscess  Assessment and Plan of Treatment: You were started on antibiotics for noted infection.   Follow wound care recommendations.   Monitor for any further signs and symptoms of further infection, including but not limited to, fevers/chills, shortness of breath, increased heart rate, dizziness, or abrupt changes in mental status. Follow-up with your primary care provider as outpatient for further recommendations.      SECONDARY DISCHARGE DIAGNOSES  Diagnosis: Dementia  Assessment and Plan of Treatment: Please continue your medications as prescribed and allow help with daily acitivities of living. Maintain a safe environment and make movements in a careful manner to prevent falls. Ensure that you are eating and drinking adequately and maintaining a healthy sleep cycle.   If you are in need of assistance with medication adjustment you can follow-up with your outpatient provider or refer to the [facility] clinic by calling [phone].

## 2022-03-29 NOTE — DISCHARGE NOTE PROVIDER - DETAILS OF MALNUTRITION DIAGNOSIS/DIAGNOSES
This patient has been assessed with a concern for Malnutrition and was treated during this hospitalization for the following Nutrition diagnosis/diagnoses:     -  03/29/2022: Severe protein-calorie malnutrition   -  03/29/2022: Underweight (BMI < 19)

## 2022-03-29 NOTE — CONSULT NOTE ADULT - REASON FOR ADMISSION
hip ulcers and L heel blister

## 2022-03-29 NOTE — DIETITIAN NUTRITION RISK NOTIFICATION - TREATMENT: THE FOLLOWING DIET HAS BEEN RECOMMENDED
Diet, DASH/TLC:   Sodium & Cholesterol Restricted  Soft and Bite Sized ([id number]) (03-28-22 @ 12:24) [Active]

## 2022-03-29 NOTE — DIETITIAN INITIAL EVALUATION ADULT. - DIET TYPE
Ensure Enlive 8oz. 2x daily (~700 Kcal, ~40 gm Protein); No Carb Prosource (1 pkg = 15 gms Protein) Qty per day: 3/supplement (specify)

## 2022-03-29 NOTE — CONSULT NOTE ADULT - CONSULT REQUESTED DATE/TIME
28-Mar-2022 11:08
29-Mar-2022 11:24
28-Mar-2022 03:47
28-Mar-2022 18:25
29-Mar-2022 13:57
28-Mar-2022 12:22

## 2022-03-29 NOTE — PROGRESS NOTE ADULT - SUBJECTIVE AND OBJECTIVE BOX
Date of Service  : 03-29-22 @ 22:41    INTERVAL HPI/OVERNIGHT EVENTS:  Vital Signs Last 24 Hrs  T(C): 36.7 (29 Mar 2022 20:34), Max: 36.9 (29 Mar 2022 02:00)  T(F): 98 (29 Mar 2022 20:34), Max: 98.5 (29 Mar 2022 02:00)  HR: 98 (29 Mar 2022 20:34) (89 - 98)  BP: 118/62 (29 Mar 2022 20:34) (118/62 - 143/81)  BP(mean): --  RR: 18 (29 Mar 2022 20:34) (18 - 18)  SpO2: 100% (29 Mar 2022 20:34) (100% - 100%)  I&O's Summary    28 Mar 2022 07:01  -  29 Mar 2022 07:00  --------------------------------------------------------  IN: 0 mL / OUT: 200 mL / NET: -200 mL      MEDICATIONS  (STANDING):  enoxaparin Injectable 30 milliGRAM(s) SubCutaneous every 24 hours  influenza  Vaccine (HIGH DOSE) 0.7 milliLiter(s) IntraMuscular once  mupirocin 2% Ointment 1 Application(s) Topical two times a day  piperacillin/tazobactam IVPB.. 3.375 Gram(s) IV Intermittent every 8 hours    MEDICATIONS  (PRN):  acetaminophen     Tablet .. 650 milliGRAM(s) Oral every 6 hours PRN Temp greater or equal to 38C (100.4F), Mild Pain (1 - 3)    LABS:                        11.2   10.74 )-----------( 186      ( 29 Mar 2022 07:16 )             33.2     03-29    142  |  105  |  11  ----------------------------<  130<H>  4.4   |  25  |  0.69    Ca    8.8      29 Mar 2022 16:13  Phos  3.9     03-29  Mg     2.20     03-29        Urinalysis Basic - ( 28 Mar 2022 03:39 )    Color: Light Yellow / Appearance: Clear / SG: >1.050 / pH: x  Gluc: x / Ketone: Negative  / Bili: Negative / Urobili: <2 mg/dL   Blood: x / Protein: Trace / Nitrite: Negative   Leuk Esterase: Large / RBC: <5 /HPF / WBC 5-10 /HPF   Sq Epi: x / Non Sq Epi: 1 /HPF / Bacteria: Occasional      CAPILLARY BLOOD GLUCOSE      POCT Blood Glucose.: 88 mg/dL (29 Mar 2022 08:53)        Urinalysis Basic - ( 28 Mar 2022 03:39 )    Color: Light Yellow / Appearance: Clear / SG: >1.050 / pH: x  Gluc: x / Ketone: Negative  / Bili: Negative / Urobili: <2 mg/dL   Blood: x / Protein: Trace / Nitrite: Negative   Leuk Esterase: Large / RBC: <5 /HPF / WBC 5-10 /HPF   Sq Epi: x / Non Sq Epi: 1 /HPF / Bacteria: Occasional      REVIEW OF SYSTEMS:  CONSTITUTIONAL: No fever, weight loss, or fatigue  EYES: No eye pain, visual disturbances, or discharge  ENMT:  No difficulty hearing, tinnitus, vertigo; No sinus or throat pain  NECK: No pain or stiffness  RESPIRATORY: No cough, wheezing, chills or hemoptysis; No shortness of breath  CARDIOVASCULAR: No chest pain, palpitations, dizziness, or leg swelling  GASTROINTESTINAL: No abdominal or epigastric pain. No nausea, vomiting, or hematemesis; No diarrhea or constipation. No melena or hematochezia.  GENITOURINARY: No dysuria, frequency, hematuria, or incontinence  NEUROLOGICAL: No headaches, memory loss, loss of strength, numbness, or tremors  SKIN: No itching, burning, rashes, or lesions   ENDOCRINE: No heat or cold intolerance; No hair loss  MUSCULOSKELETAL: No joint pain or swelling; No muscle, back, or extremity pain  PSYCHIATRIC: No depression, anxiety, mood swings, or difficulty sleeping  HEME/LYMPH: No easy bruising, or bleeding gums  ALLERY AND IMMUNOLOGIC: No hives or eczema    RADIOLOGY & ADDITIONAL TESTS:    Consultant(s) Notes Reviewed:  [x ] YES  [ ] NO    PHYSICAL EXAM:  GENERAL: NAD, well-groomed, well-developed,not in any distress ,  HEAD:  Atraumatic, Normocephalic  EYES: EOMI, PERRLA, conjunctiva and sclera clear  ENMT: No tonsillar erythema, exudates, or enlargement; Moist mucous membranes, Good dentition, No lesions  NECK: Supple, No JVD, Normal thyroid  NERVOUS SYSTEM:  Alert & Oriented X3, No focal deficit   CHEST/LUNG: Good air entry bilateral with no  rales, rhonchi, wheezing, or rubs  HEART: Regular rate and rhythm; No murmurs, rubs, or gallops  ABDOMEN: Soft, Nontender, Nondistended; Bowel sounds present  EXTREMITIES:  2+ Peripheral Pulses, No clubbing, cyanosis, or edema  SKIN: No rashes or lesions    Care Discussed with Consultants/Other Providers [ x] YES  [ ] NO Date of Service  : 03-29-22     INTERVAL HPI/OVERNIGHT EVENTS: No new concerns.   Vital Signs Last 24 Hrs  T(C): 36.7 (29 Mar 2022 20:34), Max: 36.9 (29 Mar 2022 02:00)  T(F): 98 (29 Mar 2022 20:34), Max: 98.5 (29 Mar 2022 02:00)  HR: 98 (29 Mar 2022 20:34) (89 - 98)  BP: 118/62 (29 Mar 2022 20:34) (118/62 - 143/81)  BP(mean): --  RR: 18 (29 Mar 2022 20:34) (18 - 18)  SpO2: 100% (29 Mar 2022 20:34) (100% - 100%)  I&O's Summary    28 Mar 2022 07:01  -  29 Mar 2022 07:00  --------------------------------------------------------  IN: 0 mL / OUT: 200 mL / NET: -200 mL      MEDICATIONS  (STANDING):  enoxaparin Injectable 30 milliGRAM(s) SubCutaneous every 24 hours  influenza  Vaccine (HIGH DOSE) 0.7 milliLiter(s) IntraMuscular once  mupirocin 2% Ointment 1 Application(s) Topical two times a day  piperacillin/tazobactam IVPB.. 3.375 Gram(s) IV Intermittent every 8 hours    MEDICATIONS  (PRN):  acetaminophen     Tablet .. 650 milliGRAM(s) Oral every 6 hours PRN Temp greater or equal to 38C (100.4F), Mild Pain (1 - 3)    LABS:                        11.2   10.74 )-----------( 186      ( 29 Mar 2022 07:16 )             33.2     03-29    142  |  105  |  11  ----------------------------<  130<H>  4.4   |  25  |  0.69    Ca    8.8      29 Mar 2022 16:13  Phos  3.9     03-29  Mg     2.20     03-29        Urinalysis Basic - ( 28 Mar 2022 03:39 )    Color: Light Yellow / Appearance: Clear / SG: >1.050 / pH: x  Gluc: x / Ketone: Negative  / Bili: Negative / Urobili: <2 mg/dL   Blood: x / Protein: Trace / Nitrite: Negative   Leuk Esterase: Large / RBC: <5 /HPF / WBC 5-10 /HPF   Sq Epi: x / Non Sq Epi: 1 /HPF / Bacteria: Occasional      CAPILLARY BLOOD GLUCOSE      POCT Blood Glucose.: 88 mg/dL (29 Mar 2022 08:53)        Urinalysis Basic - ( 28 Mar 2022 03:39 )    Color: Light Yellow / Appearance: Clear / SG: >1.050 / pH: x  Gluc: x / Ketone: Negative  / Bili: Negative / Urobili: <2 mg/dL   Blood: x / Protein: Trace / Nitrite: Negative   Leuk Esterase: Large / RBC: <5 /HPF / WBC 5-10 /HPF   Sq Epi: x / Non Sq Epi: 1 /HPF / Bacteria: Occasional          Consultant(s) Notes Reviewed:  [x ] YES  [ ] NO    PHYSICAL EXAM:  GENERAL: NAD, well-groomed, well-developed,not in any distress ,  HEAD:  Atraumatic, Normocephalic  NECK: Supple, No JVD, Normal thyroid  NERVOUS SYSTEM:  Alert & No focal deficit   CHEST/LUNG: Good air entry bilateral with no  rales, rhonchi, wheezing, or rubs  HEART: Regular rate and rhythm; No murmurs, rubs, or gallops  ABDOMEN: Soft, Nontender, Nondistended; Bowel sounds present    Care Discussed with Consultants/Other Providers [ x] YES  [ ] NO

## 2022-03-30 LAB
ANION GAP SERPL CALC-SCNC: 12 MMOL/L — SIGNIFICANT CHANGE UP (ref 7–14)
BUN SERPL-MCNC: 10 MG/DL — SIGNIFICANT CHANGE UP (ref 7–23)
CALCIUM SERPL-MCNC: 9.1 MG/DL — SIGNIFICANT CHANGE UP (ref 8.4–10.5)
CHLORIDE SERPL-SCNC: 106 MMOL/L — SIGNIFICANT CHANGE UP (ref 98–107)
CO2 SERPL-SCNC: 24 MMOL/L — SIGNIFICANT CHANGE UP (ref 22–31)
CREAT SERPL-MCNC: 0.7 MG/DL — SIGNIFICANT CHANGE UP (ref 0.5–1.3)
EGFR: 87 ML/MIN/1.73M2 — SIGNIFICANT CHANGE UP
GLUCOSE SERPL-MCNC: 91 MG/DL — SIGNIFICANT CHANGE UP (ref 70–99)
HCT VFR BLD CALC: 36.2 % — SIGNIFICANT CHANGE UP (ref 34.5–45)
HGB BLD-MCNC: 11.9 G/DL — SIGNIFICANT CHANGE UP (ref 11.5–15.5)
MAGNESIUM SERPL-MCNC: 2.3 MG/DL — SIGNIFICANT CHANGE UP (ref 1.6–2.6)
MCHC RBC-ENTMCNC: 28.9 PG — SIGNIFICANT CHANGE UP (ref 27–34)
MCHC RBC-ENTMCNC: 32.9 GM/DL — SIGNIFICANT CHANGE UP (ref 32–36)
MCV RBC AUTO: 87.9 FL — SIGNIFICANT CHANGE UP (ref 80–100)
NRBC # BLD: 0 /100 WBCS — SIGNIFICANT CHANGE UP
NRBC # FLD: 0 K/UL — SIGNIFICANT CHANGE UP
PHOSPHATE SERPL-MCNC: 3.8 MG/DL — SIGNIFICANT CHANGE UP (ref 2.5–4.5)
PLATELET # BLD AUTO: 195 K/UL — SIGNIFICANT CHANGE UP (ref 150–400)
POTASSIUM SERPL-MCNC: 4.3 MMOL/L — SIGNIFICANT CHANGE UP (ref 3.5–5.3)
POTASSIUM SERPL-SCNC: 4.3 MMOL/L — SIGNIFICANT CHANGE UP (ref 3.5–5.3)
RBC # BLD: 4.12 M/UL — SIGNIFICANT CHANGE UP (ref 3.8–5.2)
RBC # FLD: 15.2 % — HIGH (ref 10.3–14.5)
SODIUM SERPL-SCNC: 142 MMOL/L — SIGNIFICANT CHANGE UP (ref 135–145)
WBC # BLD: 10.64 K/UL — HIGH (ref 3.8–10.5)
WBC # FLD AUTO: 10.64 K/UL — HIGH (ref 3.8–10.5)

## 2022-03-30 PROCEDURE — 99232 SBSQ HOSP IP/OBS MODERATE 35: CPT

## 2022-03-30 RX ADMIN — PIPERACILLIN AND TAZOBACTAM 25 GRAM(S): 4; .5 INJECTION, POWDER, LYOPHILIZED, FOR SOLUTION INTRAVENOUS at 02:11

## 2022-03-30 RX ADMIN — PIPERACILLIN AND TAZOBACTAM 25 GRAM(S): 4; .5 INJECTION, POWDER, LYOPHILIZED, FOR SOLUTION INTRAVENOUS at 17:24

## 2022-03-30 RX ADMIN — Medication 500 MILLIGRAM(S): at 16:56

## 2022-03-30 RX ADMIN — Medication 1 TABLET(S): at 16:56

## 2022-03-30 RX ADMIN — MUPIROCIN 1 APPLICATION(S): 20 OINTMENT TOPICAL at 06:34

## 2022-03-30 RX ADMIN — PIPERACILLIN AND TAZOBACTAM 25 GRAM(S): 4; .5 INJECTION, POWDER, LYOPHILIZED, FOR SOLUTION INTRAVENOUS at 09:59

## 2022-03-30 RX ADMIN — MUPIROCIN 1 APPLICATION(S): 20 OINTMENT TOPICAL at 16:52

## 2022-03-30 RX ADMIN — ENOXAPARIN SODIUM 30 MILLIGRAM(S): 100 INJECTION SUBCUTANEOUS at 06:34

## 2022-03-30 NOTE — PROGRESS NOTE ADULT - SUBJECTIVE AND OBJECTIVE BOX
Date of Service  : 03-30-22     INTERVAL HPI/OVERNIGHT EVENTS: Family in room.   Vital Signs Last 24 Hrs  T(C): 36.8 (30 Mar 2022 15:53), Max: 36.8 (30 Mar 2022 00:34)  T(F): 98.2 (30 Mar 2022 15:53), Max: 98.3 (30 Mar 2022 12:10)  HR: 95 (30 Mar 2022 15:53) (90 - 96)  BP: 112/60 (30 Mar 2022 15:53) (112/60 - 140/68)  BP(mean): --  RR: 17 (30 Mar 2022 15:53) (17 - 18)  SpO2: 98% (30 Mar 2022 15:53) (98% - 99%)  I&O's Summary    MEDICATIONS  (STANDING):  ascorbic acid 500 milliGRAM(s) Oral daily  enoxaparin Injectable 30 milliGRAM(s) SubCutaneous every 24 hours  influenza  Vaccine (HIGH DOSE) 0.7 milliLiter(s) IntraMuscular once  multivitamin 1 Tablet(s) Oral daily  mupirocin 2% Ointment 1 Application(s) Topical two times a day  piperacillin/tazobactam IVPB.. 3.375 Gram(s) IV Intermittent every 8 hours    MEDICATIONS  (PRN):  acetaminophen     Tablet .. 650 milliGRAM(s) Oral every 6 hours PRN Temp greater or equal to 38C (100.4F), Mild Pain (1 - 3)    LABS:                        11.9   10.64 )-----------( 195      ( 30 Mar 2022 07:00 )             36.2     03-30    142  |  106  |  10  ----------------------------<  91  4.3   |  24  |  0.70    Ca    9.1      30 Mar 2022 07:00  Phos  3.8     03-30  Mg     2.30     03-30          CAPILLARY BLOOD GLUCOSE                  Consultant(s) Notes Reviewed:  [x ] YES  [ ] NO    PHYSICAL EXAM:  GENERAL: NAD, well-groomed, well-developed,not in any distress ,  HEAD:  Atraumatic, Normocephalic  NECK: Supple, No JVD, Normal thyroid  NERVOUS SYSTEM:  Alert &  No focal deficit   CHEST/LUNG: Good air entry bilateral   HEART: Regular rate and rhythm; No murmurs, rubs, or gallops  ABDOMEN: Soft, Nontender, Nondistended; Bowel sounds present  EXTREMITIES:  Dressed wounds     Care Discussed with Consultants/Other Providers [ x] YES  [ ] NO

## 2022-03-30 NOTE — PROGRESS NOTE ADULT - SUBJECTIVE AND OBJECTIVE BOX
[facility] [phone]        Patient seen and examined resting comfortably in bed, visitor at bedside.         MEDICATIONS  (STANDING):  ascorbic acid 500 milliGRAM(s) Oral daily  enoxaparin Injectable 30 milliGRAM(s) SubCutaneous every 24 hours  influenza  Vaccine (HIGH DOSE) 0.7 milliLiter(s) IntraMuscular once  multivitamin 1 Tablet(s) Oral daily  mupirocin 2% Ointment 1 Application(s) Topical two times a day  piperacillin/tazobactam IVPB.. 3.375 Gram(s) IV Intermittent every 8 hours      VITAL:  T(C): , Max: 36.8 (03-30-22 @ 00:34)  T(F): , Max: 98.2 (03-30-22 @ 00:34)  HR: 94 (03-30-22 @ 08:33)  BP: 127/85 (03-30-22 @ 08:33)  BP(mean): --  RR: 17 (03-30-22 @ 08:33)  SpO2: 99% (03-30-22 @ 08:33)  Wt(kg): --    I and O's:        PHYSICAL EXAM:    Constitutional: NAD, frail   Neck:  No JVD  Respiratory: diminished   Cardiovascular: S1 and S2  Gastrointestinal: BS+, soft, NT/ND  Extremities: No peripheral edema  Neurological: limited   : No Kramer  Skin: No rashes  Access: Not applicable    LABS:                        11.9   10.64 )-----------( 195      ( 30 Mar 2022 07:00 )             36.2     03-30    142  |  106  |  10  ----------------------------<  91  4.3   |  24  |  0.70    Ca    9.1      30 Mar 2022 07:00  Phos  3.8     03-30  Mg     2.30     03-30        ASSESSMENT:  (1)Hyponatremia - largely dilutional, in association with hyperglycemia. Hyperglycemia likely due to drawing blood from arm receiving IV Vanco in D5W yesterday. Na+ improved today     (2)Renal - grossly intact function    (3)ID - thigh abscess(es) - on IV Zosyn s/p Vanco    RECOMMEND:  (1)No further IVF for now  (2)Glycemic control per primary team  (3)Dose new meds for GFR >60ml/min  (4)Reconsult as needed, will sign off at this time     Stephanie Randle NP  [facility]  Office: [phone]                       [facility] [phone]        Patient seen and examined resting comfortably in bed, visitor at bedside.         MEDICATIONS  (STANDING):  ascorbic acid 500 milliGRAM(s) Oral daily  enoxaparin Injectable 30 milliGRAM(s) SubCutaneous every 24 hours  influenza  Vaccine (HIGH DOSE) 0.7 milliLiter(s) IntraMuscular once  multivitamin 1 Tablet(s) Oral daily  mupirocin 2% Ointment 1 Application(s) Topical two times a day  piperacillin/tazobactam IVPB.. 3.375 Gram(s) IV Intermittent every 8 hours      VITAL:  T(C): , Max: 36.8 (03-30-22 @ 00:34)  T(F): , Max: 98.2 (03-30-22 @ 00:34)  HR: 94 (03-30-22 @ 08:33)  BP: 127/85 (03-30-22 @ 08:33)  BP(mean): --  RR: 17 (03-30-22 @ 08:33)  SpO2: 99% (03-30-22 @ 08:33)  Wt(kg): --    I and O's:        PHYSICAL EXAM:    Constitutional: NAD, frail   Neck:  No JVD  Respiratory: diminished   Cardiovascular: S1 and S2  Gastrointestinal: BS+, soft, NT/ND  Extremities: No peripheral edema  Neurological: limited   : No Kramer  Skin: No rashes  Access: Not applicable    LABS:                        11.9   10.64 )-----------( 195      ( 30 Mar 2022 07:00 )             36.2     03-30    142  |  106  |  10  ----------------------------<  91  4.3   |  24  |  0.70    Ca    9.1      30 Mar 2022 07:00  Phos  3.8     03-30  Mg     2.30     03-30        ASSESSMENT:  81 year-old woman with pmhx of HTN,stroke, and Alzheimer's dementia (A+Ox1 at baseline) who presented 3/27/22 with a worsening left heel wound, as well as bilateral hip ulcers in association with immobility.   (1)Hyponatremia - largely dilutional, in association with hyperglycemia. Hyperglycemia likely due to drawing blood from arm receiving IV Vanco in D5W yesterday. Na+ improved today     (2)Renal - grossly intact function    (3)ID - thigh abscess(es) - on IV Zosyn s/p Vanco    RECOMMEND:  (1)No further IVF for now  (2)Glycemic control per primary team  (3)Dose new meds for GFR >60ml/min  (4)Reconsult as needed, will sign off at this time     Stephanie Randle NP  [facility]  Office: [phone]                       [facility] [phone]        Patient seen and examined resting comfortably in bed, visitor at bedside.         MEDICATIONS  (STANDING):  ascorbic acid 500 milliGRAM(s) Oral daily  enoxaparin Injectable 30 milliGRAM(s) SubCutaneous every 24 hours  influenza  Vaccine (HIGH DOSE) 0.7 milliLiter(s) IntraMuscular once  multivitamin 1 Tablet(s) Oral daily  mupirocin 2% Ointment 1 Application(s) Topical two times a day  piperacillin/tazobactam IVPB.. 3.375 Gram(s) IV Intermittent every 8 hours      VITAL:  T(C): , Max: 36.8 (03-30-22 @ 00:34)  T(F): , Max: 98.2 (03-30-22 @ 00:34)  HR: 94 (03-30-22 @ 08:33)  BP: 127/85 (03-30-22 @ 08:33)  BP(mean): --  RR: 17 (03-30-22 @ 08:33)  SpO2: 99% (03-30-22 @ 08:33)  Wt(kg): --    I and O's:        PHYSICAL EXAM:    Constitutional: NAD, frail   Neck:  No JVD  Respiratory: diminished   Cardiovascular: S1 and S2  Gastrointestinal: BS+, soft, NT/ND  Extremities: No peripheral edema  Neurological: limited   : No Kramer  Skin: No rashes  Access: Not applicable    LABS:                        11.9   10.64 )-----------( 195      ( 30 Mar 2022 07:00 )             36.2     03-30    142  |  106  |  10  ----------------------------<  91  4.3   |  24  |  0.70    Ca    9.1      30 Mar 2022 07:00  Phos  3.8     03-30  Mg     2.30     03-30        ASSESSMENT:  81 year-old woman with pmhx of HTN,stroke, and Alzheimer's dementia (A+Ox1 at baseline) who presented 3/27/22 with a worsening left heel wound, as well as bilateral hip ulcers in association with immobility.   (1)Hyponatremia - largely dilutional, in association with hyperglycemia. Hyperglycemia likely due to drawing blood from arm receiving IV Vanco in D5W yesterday. Na+ improved today     (2)Renal - grossly intact function    (3)ID - thigh abscess(es) - on IV Zosyn s/p Vanco    RECOMMEND:  (1)No further IVF for now  (2)Glycemic control per primary team  (3)Dose new meds for GFR >60ml/min  (4)Reconsult as needed, will sign off at this time     Stephanie Randle NP  [facility]  Office: [phone]          RENAL ATTENDING NOTE  Patient seen and examined with NP. Agree with assessment and plan as above.    Zane Dale MD  [facility]  [phone]

## 2022-03-30 NOTE — PROGRESS NOTE ADULT - SUBJECTIVE AND OBJECTIVE BOX
Follow Up:  decubiti, left thigh abscess    Interval History/ROS:  a bit more communicative today    Allergies  No Known Drug Allergies  shellfish (Swelling)        ANTIMICROBIALS:  piperacillin/tazobactam IVPB.. 3.375 every 8 hours        OTHER MEDS:  acetaminophen     Tablet .. 650 milliGRAM(s) Oral every 6 hours PRN  enoxaparin Injectable 30 milliGRAM(s) SubCutaneous every 24 hours  influenza  Vaccine (HIGH DOSE) 0.7 milliLiter(s) IntraMuscular once  mupirocin 2% Ointment 1 Application(s) Topical two times a day      Vital Signs Last 24 Hrs  T(F): 98.2 (03-30-22 @ 15:53), Max: 98.3 (03-30-22 @ 12:10)  HR: 95 (03-30-22 @ 15:53)  BP: 112/60 (03-30-22 @ 15:53)  RR: 17 (03-30-22 @ 15:53)  SpO2: 98% (03-30-22 @ 15:53) (98% - 100%)    PHYSICAL EXAM:  Constitutional: Not in acute distress  Eyes: No icterus.  Oral cavity: Clear, no lesions  RS: no respiratory distress on RA  CVS: S1, S2   Abdomen: Soft. No guarding/rigidity/tenderness.  : no villanueva  Extremities: left hip dressing  Skin: No rash  Vascular:  Neuro: Alert, minimal verbal                                     11.9   10.64 )-----------( 195      ( 30 Mar 2022 07:00 )             36.2 03-30    142  |  106  |  10  ----------------------------<  91  4.3   |  24  |  0.70  Ca    9.1      30 Mar 2022 07:00Phos  3.8     03-30Mg     2.30     03-30          Urinalysis Basic - ( 28 Mar 2022 03:39 )    Color: Light Yellow / Appearance: Clear / SG: >1.050 / pH: x  Gluc: x / Ketone: Negative  / Bili: Negative / Urobili: <2 mg/dL   Blood: x / Protein: Trace / Nitrite: Negative   Leuk Esterase: Large / RBC: <5 /HPF / WBC 5-10 /HPF   Sq Epi: x / Non Sq Epi: 1 /HPF / Bacteria: Occasional        MICROBIOLOGY:  Vancomycin Level, Trough: 7.8 ug/mL (03-29-22 @ 16:13)  v  Catheterized Catheterized  03-28-22   No growth  --  --      .Blood Blood  03-27-22   No growth to date.  --  --        RADIOLOGY:    rd< from: CT Pelvis w/ IV Cont (03.27.22 @ 17:23) >  ACC: [id number] EXAM:  CT PELVIS ONLY IC                          PROCEDURE DATE:  03/27/2022          INTERPRETATION:  CLINICAL INFORMATION: 81-year-old female with dementia   and left hip ulceration. History  bilateral hip soft tissue infection   December 2021    COMPARISON: Pelvic x-ray from 12/8/2021. CT 12/06/2021   CONTRAST/COMPLICATIONS:  IV Contrast: Omnipaque 350  90 cc administered   10 cc discarded  Oral Contrast: NONE  Complications: None reported at time of study completion    PROCEDURE:  CT of the Pelvis was performed.  Sagittal and coronal reformats were performed.    FINDINGS:  BLADDER: Within normal limits.  REPRODUCTIVE ORGANS: Retroflexed uterus with large calcified fibroid  LYMPH NODES: No pelvic lymphadenopathy.    VISUALIZED PORTIONS:  ABDOMINAL ORGANS: Cholelithiasis.  BOWEL: Distended rectum with feces  PERITONEUM: No ascites.  VESSELS: Within normal limits.  ABDOMINAL WALL: Left lateral thigh soft tissue defect with adjacent   rim-enhancing collection of fluid andlocules of gas with associated soft   tissue infiltration, compatible with draining abscess. In the right thigh   soft tissues there is a heterogeneous inflammatory mass measuring 3.6 x   1.5 x 2.3 cm with rim enhancement which may represent phlegmonand a site   of fat necrosis (300-79).  BONES: Right hip ORIF. Asymmetric sclerosis of the right acetabulum.   Degenerative changes of the spine.    IMPRESSION:    Open soft tissue abscess in the left thigh as above.  Additional inflammatory mass in the right thigh soft tissues with rim   enhancement and adjacent fat stranding, suspicious for developing abscess.    --- End of Report ---          JOANNE DAWKINS MD; Resident Radiologist  This document has been electronically signed.  VALDO MIR MD; Attending Radiologist  This document has been electronically signed. Mar 27 2022  6:32PM    < end of copied text >

## 2022-03-31 LAB
ANION GAP SERPL CALC-SCNC: 13 MMOL/L — SIGNIFICANT CHANGE UP (ref 7–14)
BUN SERPL-MCNC: 9 MG/DL — SIGNIFICANT CHANGE UP (ref 7–23)
CALCIUM SERPL-MCNC: 9.1 MG/DL — SIGNIFICANT CHANGE UP (ref 8.4–10.5)
CHLORIDE SERPL-SCNC: 104 MMOL/L — SIGNIFICANT CHANGE UP (ref 98–107)
CO2 SERPL-SCNC: 24 MMOL/L — SIGNIFICANT CHANGE UP (ref 22–31)
CREAT SERPL-MCNC: 0.69 MG/DL — SIGNIFICANT CHANGE UP (ref 0.5–1.3)
EGFR: 87 ML/MIN/1.73M2 — SIGNIFICANT CHANGE UP
GLUCOSE BLDC GLUCOMTR-MCNC: 93 MG/DL — SIGNIFICANT CHANGE UP (ref 70–99)
GLUCOSE SERPL-MCNC: 104 MG/DL — HIGH (ref 70–99)
HCT VFR BLD CALC: 34.9 % — SIGNIFICANT CHANGE UP (ref 34.5–45)
HGB BLD-MCNC: 11.9 G/DL — SIGNIFICANT CHANGE UP (ref 11.5–15.5)
MAGNESIUM SERPL-MCNC: 2.2 MG/DL — SIGNIFICANT CHANGE UP (ref 1.6–2.6)
MCHC RBC-ENTMCNC: 29 PG — SIGNIFICANT CHANGE UP (ref 27–34)
MCHC RBC-ENTMCNC: 34.1 GM/DL — SIGNIFICANT CHANGE UP (ref 32–36)
MCV RBC AUTO: 85.1 FL — SIGNIFICANT CHANGE UP (ref 80–100)
NRBC # BLD: 0 /100 WBCS — SIGNIFICANT CHANGE UP
NRBC # FLD: 0 K/UL — SIGNIFICANT CHANGE UP
PHOSPHATE SERPL-MCNC: 3.6 MG/DL — SIGNIFICANT CHANGE UP (ref 2.5–4.5)
PLATELET # BLD AUTO: 214 K/UL — SIGNIFICANT CHANGE UP (ref 150–400)
POTASSIUM SERPL-MCNC: 4 MMOL/L — SIGNIFICANT CHANGE UP (ref 3.5–5.3)
POTASSIUM SERPL-SCNC: 4 MMOL/L — SIGNIFICANT CHANGE UP (ref 3.5–5.3)
RBC # BLD: 4.1 M/UL — SIGNIFICANT CHANGE UP (ref 3.8–5.2)
RBC # FLD: 15 % — HIGH (ref 10.3–14.5)
SODIUM SERPL-SCNC: 141 MMOL/L — SIGNIFICANT CHANGE UP (ref 135–145)
WBC # BLD: 10.33 K/UL — SIGNIFICANT CHANGE UP (ref 3.8–10.5)
WBC # FLD AUTO: 10.33 K/UL — SIGNIFICANT CHANGE UP (ref 3.8–10.5)

## 2022-03-31 RX ORDER — PIPERACILLIN AND TAZOBACTAM 4; .5 G/20ML; G/20ML
3.38 INJECTION, POWDER, LYOPHILIZED, FOR SOLUTION INTRAVENOUS EVERY 8 HOURS
Refills: 0 | Status: COMPLETED | OUTPATIENT
Start: 2022-03-31 | End: 2022-04-06

## 2022-03-31 RX ADMIN — Medication 500 MILLIGRAM(S): at 10:39

## 2022-03-31 RX ADMIN — MUPIROCIN 1 APPLICATION(S): 20 OINTMENT TOPICAL at 05:12

## 2022-03-31 RX ADMIN — MUPIROCIN 1 APPLICATION(S): 20 OINTMENT TOPICAL at 18:01

## 2022-03-31 RX ADMIN — PIPERACILLIN AND TAZOBACTAM 25 GRAM(S): 4; .5 INJECTION, POWDER, LYOPHILIZED, FOR SOLUTION INTRAVENOUS at 10:35

## 2022-03-31 RX ADMIN — Medication 1 TABLET(S): at 10:38

## 2022-03-31 RX ADMIN — PIPERACILLIN AND TAZOBACTAM 25 GRAM(S): 4; .5 INJECTION, POWDER, LYOPHILIZED, FOR SOLUTION INTRAVENOUS at 02:36

## 2022-03-31 RX ADMIN — PIPERACILLIN AND TAZOBACTAM 25 GRAM(S): 4; .5 INJECTION, POWDER, LYOPHILIZED, FOR SOLUTION INTRAVENOUS at 18:15

## 2022-03-31 RX ADMIN — ENOXAPARIN SODIUM 30 MILLIGRAM(S): 100 INJECTION SUBCUTANEOUS at 05:11

## 2022-03-31 NOTE — PROGRESS NOTE ADULT - SUBJECTIVE AND OBJECTIVE BOX
Date of Service  : 03-31-22     INTERVAL HPI/OVERNIGHT EVENTS: No new concerns.   Vital Signs Last 24 Hrs  T(C): 36.8 (31 Mar 2022 14:19), Max: 37.3 (31 Mar 2022 02:30)  T(F): 98.2 (31 Mar 2022 14:19), Max: 99.1 (31 Mar 2022 02:30)  HR: 103 (31 Mar 2022 14:19) (83 - 103)  BP: 110/66 (31 Mar 2022 14:19) (110/66 - 159/84)  BP(mean): --  RR: 17 (31 Mar 2022 14:19) (17 - 18)  SpO2: 100% (31 Mar 2022 14:19) (98% - 100%)  I&O's Summary    30 Mar 2022 07:01  -  31 Mar 2022 07:00  --------------------------------------------------------  IN: 100 mL / OUT: 0 mL / NET: 100 mL      MEDICATIONS  (STANDING):  ascorbic acid 500 milliGRAM(s) Oral daily  enoxaparin Injectable 30 milliGRAM(s) SubCutaneous every 24 hours  influenza  Vaccine (HIGH DOSE) 0.7 milliLiter(s) IntraMuscular once  multivitamin 1 Tablet(s) Oral daily  mupirocin 2% Ointment 1 Application(s) Topical two times a day  piperacillin/tazobactam IVPB.. 3.375 Gram(s) IV Intermittent every 8 hours    MEDICATIONS  (PRN):  acetaminophen     Tablet .. 650 milliGRAM(s) Oral every 6 hours PRN Temp greater or equal to 38C (100.4F), Mild Pain (1 - 3)    LABS:                        11.9   10.33 )-----------( 214      ( 31 Mar 2022 06:42 )             34.9     03-31    141  |  104  |  9   ----------------------------<  104<H>  4.0   |  24  |  0.69    Ca    9.1      31 Mar 2022 06:42  Phos  3.6     03-31  Mg     2.20     03-31          CAPILLARY BLOOD GLUCOSE      POCT Blood Glucose.: 93 mg/dL (31 Mar 2022 09:10)              Consultant(s) Notes Reviewed:  [x ] YES  [ ] NO    PHYSICAL EXAM:  GENERAL: NAD, well-groomed, well-developed,not in any distress ,  HEAD:  Atraumatic, Normocephalic  NECK: Supple, No JVD, Normal thyroid  NERVOUS SYSTEM:  Alert   CHEST/LUNG: Good air entry bilateral with no  rales, rhonchi, wheezing, or rubs  HEART: Regular rate and rhythm; No murmurs, rubs, or gallops  ABDOMEN: Soft, Nontender, Nondistended; Bowel sounds present  EXTREMITIES:  left hip ,left fppt dressed   Care Discussed with Consultants/Other Providers [ x] YES  [ ] NO

## 2022-03-31 NOTE — PHYSICAL THERAPY INITIAL EVALUATION ADULT - ADDITIONAL COMMENTS
patient has a few steps at entrance of her home and once inside she can remain on first floor as per friend at bedside

## 2022-03-31 NOTE — PHYSICAL THERAPY INITIAL EVALUATION ADULT - PERTINENT HX OF CURRENT PROBLEM, REHAB EVAL
81 year old female with PMHx HTN, CVA w/o focal residual deficits, Alzheimer's dementia (AOx1 at baseline) who presents with worsening left heel wound. Per the patient's granddaughter, the patient has been at her baseline metantal status, however with a worsening heel wound and bilateral hip ulcers/wounds on her body from not moving around.

## 2022-04-01 LAB
ANION GAP SERPL CALC-SCNC: 14 MMOL/L — SIGNIFICANT CHANGE UP (ref 7–14)
BUN SERPL-MCNC: 10 MG/DL — SIGNIFICANT CHANGE UP (ref 7–23)
CALCIUM SERPL-MCNC: 9.4 MG/DL — SIGNIFICANT CHANGE UP (ref 8.4–10.5)
CHLORIDE SERPL-SCNC: 104 MMOL/L — SIGNIFICANT CHANGE UP (ref 98–107)
CO2 SERPL-SCNC: 22 MMOL/L — SIGNIFICANT CHANGE UP (ref 22–31)
CREAT SERPL-MCNC: 0.66 MG/DL — SIGNIFICANT CHANGE UP (ref 0.5–1.3)
CULTURE RESULTS: SIGNIFICANT CHANGE UP
CULTURE RESULTS: SIGNIFICANT CHANGE UP
EGFR: 88 ML/MIN/1.73M2 — SIGNIFICANT CHANGE UP
GLUCOSE SERPL-MCNC: 112 MG/DL — HIGH (ref 70–99)
HCT VFR BLD CALC: 35.5 % — SIGNIFICANT CHANGE UP (ref 34.5–45)
HGB BLD-MCNC: 12 G/DL — SIGNIFICANT CHANGE UP (ref 11.5–15.5)
MAGNESIUM SERPL-MCNC: 2.2 MG/DL — SIGNIFICANT CHANGE UP (ref 1.6–2.6)
MCHC RBC-ENTMCNC: 29.3 PG — SIGNIFICANT CHANGE UP (ref 27–34)
MCHC RBC-ENTMCNC: 33.8 GM/DL — SIGNIFICANT CHANGE UP (ref 32–36)
MCV RBC AUTO: 86.8 FL — SIGNIFICANT CHANGE UP (ref 80–100)
NRBC # BLD: 0 /100 WBCS — SIGNIFICANT CHANGE UP
NRBC # FLD: 0 K/UL — SIGNIFICANT CHANGE UP
PHOSPHATE SERPL-MCNC: 3.8 MG/DL — SIGNIFICANT CHANGE UP (ref 2.5–4.5)
PLATELET # BLD AUTO: 256 K/UL — SIGNIFICANT CHANGE UP (ref 150–400)
POTASSIUM SERPL-MCNC: 3.9 MMOL/L — SIGNIFICANT CHANGE UP (ref 3.5–5.3)
POTASSIUM SERPL-SCNC: 3.9 MMOL/L — SIGNIFICANT CHANGE UP (ref 3.5–5.3)
RBC # BLD: 4.09 M/UL — SIGNIFICANT CHANGE UP (ref 3.8–5.2)
RBC # FLD: 15.1 % — HIGH (ref 10.3–14.5)
SODIUM SERPL-SCNC: 140 MMOL/L — SIGNIFICANT CHANGE UP (ref 135–145)
SPECIMEN SOURCE: SIGNIFICANT CHANGE UP
SPECIMEN SOURCE: SIGNIFICANT CHANGE UP
WBC # BLD: 10.92 K/UL — HIGH (ref 3.8–10.5)
WBC # FLD AUTO: 10.92 K/UL — HIGH (ref 3.8–10.5)

## 2022-04-01 PROCEDURE — 72193 CT PELVIS W/DYE: CPT | Mod: 26

## 2022-04-01 PROCEDURE — 99231 SBSQ HOSP IP/OBS SF/LOW 25: CPT

## 2022-04-01 RX ADMIN — Medication 1 TABLET(S): at 12:22

## 2022-04-01 RX ADMIN — PIPERACILLIN AND TAZOBACTAM 25 GRAM(S): 4; .5 INJECTION, POWDER, LYOPHILIZED, FOR SOLUTION INTRAVENOUS at 20:47

## 2022-04-01 RX ADMIN — MUPIROCIN 1 APPLICATION(S): 20 OINTMENT TOPICAL at 16:18

## 2022-04-01 RX ADMIN — MUPIROCIN 1 APPLICATION(S): 20 OINTMENT TOPICAL at 05:49

## 2022-04-01 RX ADMIN — Medication 500 MILLIGRAM(S): at 12:21

## 2022-04-01 RX ADMIN — PIPERACILLIN AND TAZOBACTAM 25 GRAM(S): 4; .5 INJECTION, POWDER, LYOPHILIZED, FOR SOLUTION INTRAVENOUS at 02:28

## 2022-04-01 RX ADMIN — ENOXAPARIN SODIUM 30 MILLIGRAM(S): 100 INJECTION SUBCUTANEOUS at 05:49

## 2022-04-01 RX ADMIN — PIPERACILLIN AND TAZOBACTAM 25 GRAM(S): 4; .5 INJECTION, POWDER, LYOPHILIZED, FOR SOLUTION INTRAVENOUS at 12:22

## 2022-04-01 NOTE — PROGRESS NOTE ADULT - SUBJECTIVE AND OBJECTIVE BOX
Date of Service  : 04-01-22     INTERVAL HPI/OVERNIGHT EVENTS: Oer family feeling fine.   Vital Signs Last 24 Hrs  T(C): 36.8 (01 Apr 2022 11:40), Max: 37.1 (31 Mar 2022 21:22)  T(F): 98.2 (01 Apr 2022 11:40), Max: 98.7 (31 Mar 2022 21:22)  HR: 100 (01 Apr 2022 11:40) (98 - 100)  BP: 107/81 (01 Apr 2022 11:40) (107/81 - 146/93)  BP(mean): --  RR: 18 (01 Apr 2022 11:40) (18 - 18)  SpO2: 98% (01 Apr 2022 11:40) (97% - 100%)  I&O's Summary    MEDICATIONS  (STANDING):  ascorbic acid 500 milliGRAM(s) Oral daily  enoxaparin Injectable 30 milliGRAM(s) SubCutaneous every 24 hours  influenza  Vaccine (HIGH DOSE) 0.7 milliLiter(s) IntraMuscular once  multivitamin 1 Tablet(s) Oral daily  mupirocin 2% Ointment 1 Application(s) Topical two times a day  piperacillin/tazobactam IVPB.. 3.375 Gram(s) IV Intermittent every 8 hours    MEDICATIONS  (PRN):  acetaminophen     Tablet .. 650 milliGRAM(s) Oral every 6 hours PRN Temp greater or equal to 38C (100.4F), Mild Pain (1 - 3)    LABS:                        12.0   10.92 )-----------( 256      ( 01 Apr 2022 07:12 )             35.5     04-01    140  |  104  |  10  ----------------------------<  112<H>  3.9   |  22  |  0.66    Ca    9.4      01 Apr 2022 07:12  Phos  3.8     04-01  Mg     2.20     04-01              Consultant(s) Notes Reviewed:  [x ] YES  [ ] NO    PHYSICAL EXAM:  GENERAL: NAD, cachetic ,not in any distress ,  HEAD:  Atraumatic, Normocephalic  NECK: Supple, No JVD, Normal thyroid  NERVOUS SYSTEM:  Alert  CHEST/LUNG: Good air entry bilateral with no  rales, rhonchi, wheezing, or rubs  HEART: Regular rate and rhythm; No murmurs, rubs, or gallops  ABDOMEN: Soft, Nontender, Nondistended; Bowel sounds present  EXTREMITIES:  No clubbing, cyanosis, or edema  SKIN: wounds dressed     Care Discussed with Consultants/Other Providers [ x] YES  [ ] NO

## 2022-04-01 NOTE — PROGRESS NOTE ADULT - SUBJECTIVE AND OBJECTIVE BOX
Follow Up:  decubiti, left thigh abscess    Interval History/ROS:    afebrile     Allergies  No Known Drug Allergies  shellfish (Swelling)        ANTIMICROBIALS:  piperacillin/tazobactam IVPB.. 3.375 every 8 hours          OTHER MEDS:  acetaminophen     Tablet .. 650 milliGRAM(s) Oral every 6 hours PRN  enoxaparin Injectable 30 milliGRAM(s) SubCutaneous every 24 hours  influenza  Vaccine (HIGH DOSE) 0.7 milliLiter(s) IntraMuscular once  mupirocin 2% Ointment 1 Application(s) Topical two times a day    Vital Signs Last 24 Hrs  T(F): 98.2 (04-01-22 @ 11:40), Max: 98.7 (03-31-22 @ 21:22)  HR: 100 (04-01-22 @ 11:40)  BP: 107/81 (04-01-22 @ 11:40)  RR: 18 (04-01-22 @ 11:40)  SpO2: 98% (04-01-22 @ 11:40) (97% - 100%)    PHYSICAL EXAM:  Constitutional: Not in acute distress  Eyes: No icterus.  Oral cavity: Clear, no lesions  RS: no respiratory distress on RA  CVS: S1, S2   Abdomen: Soft. No guarding/rigidity/tenderness.  : no villanueva  Extremities: left hip dressing, heel dressing, boot  Skin: No rash  Neuro: awakens, minimal verbal                                     11.9   10.64 )-----------( 195      ( 30 Mar 2022 07:00 )             36.2 03-30    142  |  106  |  10  ----------------------------<  91  4.3   |  24  |  0.70  Ca    9.1      30 Mar 2022 07:00Phos  3.8     03-30Mg     2.30     03-30          Urinalysis Basic - ( 28 Mar 2022 03:39 )    Color: Light Yellow / Appearance: Clear / SG: >1.050 / pH: x  Gluc: x / Ketone: Negative  / Bili: Negative / Urobili: <2 mg/dL   Blood: x / Protein: Trace / Nitrite: Negative   Leuk Esterase: Large / RBC: <5 /HPF / WBC 5-10 /HPF   Sq Epi: x / Non Sq Epi: 1 /HPF / Bacteria: Occasional        MICROBIOLOGY:    v  Catheterized Catheterized  03-28-22   No growth  --  --      .Blood Blood  03-27-22   No growth  --  --        RADIOLOGY:    rad< from: CT Pelvis w/ IV Cont (04.01.22 @ 11:35) >    ACC: [id number] EXAM:  CT PELVIS ONLY IC                          PROCEDURE DATE:  04/01/2022          INTERPRETATION:  CLINICAL INFORMATION: Sepsis. Known soft tissue   abscesses. Repeat imaging for possible drainage.    COMPARISON: CT pelvis 3/27/2022    CONTRAST/COMPLICATIONS:  IV Contrast: Omnipaque 350  180 cc administered   20 cc discarded  Oral Contrast: NONE  Complications: 90 cc of contrast extravasated into the patient's left   forearm peripheral IV. Patient was examined with soft tissue swelling   noted with no sensorimotor abnormality. Patient's nurse was informed at   the time.    PROCEDURE:  CT of the Pelvis was performed.  Sagittal and coronal reformats were performed.    FINDINGS:  BLADDER: Within normal limits.  REPRODUCTIVE ORGANS: Retroflexed uterus with calcified fibroids.  LYMPH NODES: No pelvic lymphadenopathy.    VISUALIZED PORTIONS:  ABDOMINAL ORGANS: Cholelithiasis. 3 mm nonobstructing stone in the left   kidney.  BOWEL: Colonic diverticulosis without diverticulitis. Moderate amount of   stool.  PERITONEUM: No ascites.  VESSELS: Atherosclerotic changes.  ABDOMINAL WALL: Redemonstration of left lateral thigh soft tissue defect   with adjacent rim-enhancing collection of fluid and locules of gas with   surrounding soft tissue edema. Interval decrease in fluid and gas   component of the collection with increased thickening of the rim of the   collection. Right lateral thigh low density structure measuring 3.3 x 1.3   x 3.4 cm, without significant interval change. There is adjacent soft   tissue stranding.  BONES: Degenerative changes. Status post internal fixation of the   proximal right femur. Extravasated contrast in the left forearm is noted.    IMPRESSION:  Left lateral thigh draining abscess with overall interval decrease in the   fluid component.  Grossly unchanged low density structure in the right lateral thigh with   no significant fluid component identified.    --- End of Report ---          KRISTAL GUERRERO MD; Resident Radiology  This document has been electronically signed.  MATHIEU MIGUEL MD; Attending Radiologist    < end of copied text >      rd< from: CT Pelvis w/ IV Cont (03.27.22 @ 17:23) >  ACC: [id number] EXAM:  CT PELVIS ONLY IC                          PROCEDURE DATE:  03/27/2022          INTERPRETATION:  CLINICAL INFORMATION: 81-year-old female with dementia   and left hip ulceration. History  bilateral hip soft tissue infection   December 2021    COMPARISON: Pelvic x-ray from 12/8/2021. CT 12/06/2021   CONTRAST/COMPLICATIONS:  IV Contrast: Omnipaque 350  90 cc administered   10 cc discarded  Oral Contrast: NONE  Complications: None reported at time of study completion    PROCEDURE:  CT of the Pelvis was performed.  Sagittal and coronal reformats were performed.    FINDINGS:  BLADDER: Within normal limits.  REPRODUCTIVE ORGANS: Retroflexed uterus with large calcified fibroid  LYMPH NODES: No pelvic lymphadenopathy.    VISUALIZED PORTIONS:  ABDOMINAL ORGANS: Cholelithiasis.  BOWEL: Distended rectum with feces  PERITONEUM: No ascites.  VESSELS: Within normal limits.  ABDOMINAL WALL: Left lateral thigh soft tissue defect with adjacent   rim-enhancing collection of fluid andlocules of gas with associated soft   tissue infiltration, compatible with draining abscess. In the right thigh   soft tissues there is a heterogeneous inflammatory mass measuring 3.6 x   1.5 x 2.3 cm with rim enhancement which may represent phlegmonand a site   of fat necrosis (300-79).  BONES: Right hip ORIF. Asymmetric sclerosis of the right acetabulum.   Degenerative changes of the spine.    IMPRESSION:    Open soft tissue abscess in the left thigh as above.  Additional inflammatory mass in the right thigh soft tissues with rim   enhancement and adjacent fat stranding, suspicious for developing abscess.    --- End of Report ---          JOANNE DAWKINS MD; Resident Radiologist  This document has been electronically signed.  VALDO MIR MD; Attending Radiologist  This document has been electronically signed. Mar 27 2022  6:32PM    < end of copied text >

## 2022-04-02 LAB
ANION GAP SERPL CALC-SCNC: 16 MMOL/L — HIGH (ref 7–14)
BUN SERPL-MCNC: 13 MG/DL — SIGNIFICANT CHANGE UP (ref 7–23)
CALCIUM SERPL-MCNC: 9.6 MG/DL — SIGNIFICANT CHANGE UP (ref 8.4–10.5)
CHLORIDE SERPL-SCNC: 105 MMOL/L — SIGNIFICANT CHANGE UP (ref 98–107)
CO2 SERPL-SCNC: 20 MMOL/L — LOW (ref 22–31)
CREAT SERPL-MCNC: 0.72 MG/DL — SIGNIFICANT CHANGE UP (ref 0.5–1.3)
EGFR: 84 ML/MIN/1.73M2 — SIGNIFICANT CHANGE UP
GLUCOSE SERPL-MCNC: 112 MG/DL — HIGH (ref 70–99)
HCT VFR BLD CALC: 36 % — SIGNIFICANT CHANGE UP (ref 34.5–45)
HGB BLD-MCNC: 11.8 G/DL — SIGNIFICANT CHANGE UP (ref 11.5–15.5)
MAGNESIUM SERPL-MCNC: 2.1 MG/DL — SIGNIFICANT CHANGE UP (ref 1.6–2.6)
MCHC RBC-ENTMCNC: 28.8 PG — SIGNIFICANT CHANGE UP (ref 27–34)
MCHC RBC-ENTMCNC: 32.8 GM/DL — SIGNIFICANT CHANGE UP (ref 32–36)
MCV RBC AUTO: 87.8 FL — SIGNIFICANT CHANGE UP (ref 80–100)
NRBC # BLD: 0 /100 WBCS — SIGNIFICANT CHANGE UP
NRBC # FLD: 0 K/UL — SIGNIFICANT CHANGE UP
PHOSPHATE SERPL-MCNC: 4.1 MG/DL — SIGNIFICANT CHANGE UP (ref 2.5–4.5)
PLATELET # BLD AUTO: 257 K/UL — SIGNIFICANT CHANGE UP (ref 150–400)
POTASSIUM SERPL-MCNC: 4.1 MMOL/L — SIGNIFICANT CHANGE UP (ref 3.5–5.3)
POTASSIUM SERPL-SCNC: 4.1 MMOL/L — SIGNIFICANT CHANGE UP (ref 3.5–5.3)
RBC # BLD: 4.1 M/UL — SIGNIFICANT CHANGE UP (ref 3.8–5.2)
RBC # FLD: 15 % — HIGH (ref 10.3–14.5)
SODIUM SERPL-SCNC: 141 MMOL/L — SIGNIFICANT CHANGE UP (ref 135–145)
WBC # BLD: 16.46 K/UL — HIGH (ref 3.8–10.5)
WBC # FLD AUTO: 16.46 K/UL — HIGH (ref 3.8–10.5)

## 2022-04-02 RX ADMIN — PIPERACILLIN AND TAZOBACTAM 25 GRAM(S): 4; .5 INJECTION, POWDER, LYOPHILIZED, FOR SOLUTION INTRAVENOUS at 06:09

## 2022-04-02 RX ADMIN — ENOXAPARIN SODIUM 30 MILLIGRAM(S): 100 INJECTION SUBCUTANEOUS at 06:09

## 2022-04-02 RX ADMIN — PIPERACILLIN AND TAZOBACTAM 25 GRAM(S): 4; .5 INJECTION, POWDER, LYOPHILIZED, FOR SOLUTION INTRAVENOUS at 20:59

## 2022-04-02 RX ADMIN — Medication 1 TABLET(S): at 11:54

## 2022-04-02 RX ADMIN — PIPERACILLIN AND TAZOBACTAM 25 GRAM(S): 4; .5 INJECTION, POWDER, LYOPHILIZED, FOR SOLUTION INTRAVENOUS at 11:52

## 2022-04-02 RX ADMIN — Medication 500 MILLIGRAM(S): at 11:54

## 2022-04-02 RX ADMIN — Medication 650 MILLIGRAM(S): at 04:00

## 2022-04-02 RX ADMIN — MUPIROCIN 1 APPLICATION(S): 20 OINTMENT TOPICAL at 17:07

## 2022-04-02 RX ADMIN — MUPIROCIN 1 APPLICATION(S): 20 OINTMENT TOPICAL at 06:09

## 2022-04-02 RX ADMIN — Medication 650 MILLIGRAM(S): at 03:09

## 2022-04-02 NOTE — PROGRESS NOTE ADULT - SUBJECTIVE AND OBJECTIVE BOX
Date of Service  : 04-02-22     INTERVAL HPI/OVERNIGHT EVENTS: No new concerns per staff .  Vital Signs Last 24 Hrs  T(C): 36.8 (02 Apr 2022 11:11), Max: 37.2 (01 Apr 2022 20:28)  T(F): 98.2 (02 Apr 2022 11:11), Max: 99 (01 Apr 2022 20:28)  HR: 100 (02 Apr 2022 11:11) (94 - 100)  BP: 138/72 (02 Apr 2022 11:11) (116/81 - 143/82)  BP(mean): --  RR: 18 (02 Apr 2022 11:11) (17 - 18)  SpO2: 97% (02 Apr 2022 11:11) (97% - 97%)  I&O's Summary    MEDICATIONS  (STANDING):  ascorbic acid 500 milliGRAM(s) Oral daily  enoxaparin Injectable 30 milliGRAM(s) SubCutaneous every 24 hours  influenza  Vaccine (HIGH DOSE) 0.7 milliLiter(s) IntraMuscular once  multivitamin 1 Tablet(s) Oral daily  mupirocin 2% Ointment 1 Application(s) Topical two times a day  piperacillin/tazobactam IVPB.. 3.375 Gram(s) IV Intermittent every 8 hours    MEDICATIONS  (PRN):  acetaminophen     Tablet .. 650 milliGRAM(s) Oral every 6 hours PRN Temp greater or equal to 38C (100.4F), Mild Pain (1 - 3)    LABS:                        11.8   16.46 )-----------( 257      ( 02 Apr 2022 06:55 )             36.0     04-02    141  |  105  |  13  ----------------------------<  112<H>  4.1   |  20<L>  |  0.72    Ca    9.6      02 Apr 2022 06:55  Phos  4.1     04-02  Mg     2.10     04-02          CAPILLARY BLOOD GLUCOSE                  Consultant(s) Notes Reviewed:  [x ] YES  [ ] NO    PHYSICAL EXAM:  GENERAL: NAD, well-groomed, well-developed,not in any distress ,  HEAD:  Atraumatic, Normocephalic  NECK: Supple, No JVD, Normal thyroid  NERVOUS SYSTEM:  Alert   CHEST/LUNG: Good air entry bilateral with no  rales, rhonchi, wheezing, or rubs  HEART: Regular rate and rhythm; No murmurs, rubs, or gallops  ABDOMEN: Soft, Nontender, Nondistended; Bowel sounds present  EXTREMITIES: wounds dressed     Care Discussed with Consultants/Other Providers [ x] YES  [ ] NO

## 2022-04-03 LAB
ANION GAP SERPL CALC-SCNC: 13 MMOL/L — SIGNIFICANT CHANGE UP (ref 7–14)
BUN SERPL-MCNC: 10 MG/DL — SIGNIFICANT CHANGE UP (ref 7–23)
CALCIUM SERPL-MCNC: 9.1 MG/DL — SIGNIFICANT CHANGE UP (ref 8.4–10.5)
CHLORIDE SERPL-SCNC: 107 MMOL/L — SIGNIFICANT CHANGE UP (ref 98–107)
CO2 SERPL-SCNC: 22 MMOL/L — SIGNIFICANT CHANGE UP (ref 22–31)
CREAT SERPL-MCNC: 0.73 MG/DL — SIGNIFICANT CHANGE UP (ref 0.5–1.3)
EGFR: 83 ML/MIN/1.73M2 — SIGNIFICANT CHANGE UP
GLUCOSE SERPL-MCNC: 94 MG/DL — SIGNIFICANT CHANGE UP (ref 70–99)
HCT VFR BLD CALC: 34.3 % — LOW (ref 34.5–45)
HGB BLD-MCNC: 11.4 G/DL — LOW (ref 11.5–15.5)
MAGNESIUM SERPL-MCNC: 2.2 MG/DL — SIGNIFICANT CHANGE UP (ref 1.6–2.6)
MCHC RBC-ENTMCNC: 29.2 PG — SIGNIFICANT CHANGE UP (ref 27–34)
MCHC RBC-ENTMCNC: 33.2 GM/DL — SIGNIFICANT CHANGE UP (ref 32–36)
MCV RBC AUTO: 87.7 FL — SIGNIFICANT CHANGE UP (ref 80–100)
NRBC # BLD: 0 /100 WBCS — SIGNIFICANT CHANGE UP
NRBC # FLD: 0 K/UL — SIGNIFICANT CHANGE UP
PHOSPHATE SERPL-MCNC: 3.9 MG/DL — SIGNIFICANT CHANGE UP (ref 2.5–4.5)
PLATELET # BLD AUTO: 243 K/UL — SIGNIFICANT CHANGE UP (ref 150–400)
POTASSIUM SERPL-MCNC: 4.3 MMOL/L — SIGNIFICANT CHANGE UP (ref 3.5–5.3)
POTASSIUM SERPL-SCNC: 4.3 MMOL/L — SIGNIFICANT CHANGE UP (ref 3.5–5.3)
RBC # BLD: 3.91 M/UL — SIGNIFICANT CHANGE UP (ref 3.8–5.2)
RBC # FLD: 15.5 % — HIGH (ref 10.3–14.5)
SARS-COV-2 RNA SPEC QL NAA+PROBE: SIGNIFICANT CHANGE UP
SODIUM SERPL-SCNC: 142 MMOL/L — SIGNIFICANT CHANGE UP (ref 135–145)
WBC # BLD: 12.41 K/UL — HIGH (ref 3.8–10.5)
WBC # FLD AUTO: 12.41 K/UL — HIGH (ref 3.8–10.5)

## 2022-04-03 RX ADMIN — MUPIROCIN 1 APPLICATION(S): 20 OINTMENT TOPICAL at 17:54

## 2022-04-03 RX ADMIN — PIPERACILLIN AND TAZOBACTAM 25 GRAM(S): 4; .5 INJECTION, POWDER, LYOPHILIZED, FOR SOLUTION INTRAVENOUS at 04:41

## 2022-04-03 RX ADMIN — MUPIROCIN 1 APPLICATION(S): 20 OINTMENT TOPICAL at 05:28

## 2022-04-03 RX ADMIN — ENOXAPARIN SODIUM 30 MILLIGRAM(S): 100 INJECTION SUBCUTANEOUS at 05:28

## 2022-04-03 RX ADMIN — Medication 1 TABLET(S): at 13:24

## 2022-04-03 RX ADMIN — PIPERACILLIN AND TAZOBACTAM 25 GRAM(S): 4; .5 INJECTION, POWDER, LYOPHILIZED, FOR SOLUTION INTRAVENOUS at 13:24

## 2022-04-03 RX ADMIN — PIPERACILLIN AND TAZOBACTAM 25 GRAM(S): 4; .5 INJECTION, POWDER, LYOPHILIZED, FOR SOLUTION INTRAVENOUS at 19:44

## 2022-04-03 RX ADMIN — Medication 500 MILLIGRAM(S): at 13:24

## 2022-04-03 NOTE — PROGRESS NOTE ADULT - SUBJECTIVE AND OBJECTIVE BOX
Date of Service  : 04-03-22     INTERVAL HPI/OVERNIGHT EVENTS: Seen and examined earlier . No new concerns.   Vital Signs Last 24 Hrs  T(C): 36.6 (03 Apr 2022 05:15), Max: 36.9 (02 Apr 2022 20:34)  T(F): 97.9 (03 Apr 2022 05:15), Max: 98.5 (02 Apr 2022 20:34)  HR: 98 (03 Apr 2022 05:15) (96 - 98)  BP: 133/62 (03 Apr 2022 05:15) (117/71 - 133/62)  BP(mean): --  RR: 18 (03 Apr 2022 05:15) (18 - 18)  SpO2: 99% (03 Apr 2022 05:15) (99% - 100%)  I&O's Summary    MEDICATIONS  (STANDING):  ascorbic acid 500 milliGRAM(s) Oral daily  enoxaparin Injectable 30 milliGRAM(s) SubCutaneous every 24 hours  influenza  Vaccine (HIGH DOSE) 0.7 milliLiter(s) IntraMuscular once  multivitamin 1 Tablet(s) Oral daily  mupirocin 2% Ointment 1 Application(s) Topical two times a day  piperacillin/tazobactam IVPB.. 3.375 Gram(s) IV Intermittent every 8 hours    MEDICATIONS  (PRN):  acetaminophen     Tablet .. 650 milliGRAM(s) Oral every 6 hours PRN Temp greater or equal to 38C (100.4F), Mild Pain (1 - 3)    LABS:                        11.4   12.41 )-----------( 243      ( 03 Apr 2022 07:00 )             34.3     04-03    142  |  107  |  10  ----------------------------<  94  4.3   |  22  |  0.73    Ca    9.1      03 Apr 2022 07:00  Phos  3.9     04-03  Mg     2.20     04-03          CAPILLARY BLOOD GLUCOSE                Consultant(s) Notes Reviewed:  [x ] YES  [ ] NO    PHYSICAL EXAM:  GENERAL: NAD, well-groomed, well-developed,not in any distress ,  HEAD:  Atraumatic, Normocephalic  NECK: Supple, No JVD, Normal thyroid  NERVOUS SYSTEM:  Alert   CHEST/LUNG: Good air entry bilateral with no  rales, rhonchi, wheezing, or rubs  HEART: Regular rate and rhythm; No murmurs, rubs, or gallops  ABDOMEN: Soft, Nontender, Nondistended; Bowel sounds present  EXTREMITIES:  2+ Peripheral Pulses, No clubbing, cyanosis, or edema  SKIN: wounds dressed     Care Discussed with Consultants/Other Providers [ x] YES  [ ] NO

## 2022-04-04 LAB
ANION GAP SERPL CALC-SCNC: 14 MMOL/L — SIGNIFICANT CHANGE UP (ref 7–14)
BUN SERPL-MCNC: 11 MG/DL — SIGNIFICANT CHANGE UP (ref 7–23)
CALCIUM SERPL-MCNC: 9.3 MG/DL — SIGNIFICANT CHANGE UP (ref 8.4–10.5)
CHLORIDE SERPL-SCNC: 108 MMOL/L — HIGH (ref 98–107)
CO2 SERPL-SCNC: 22 MMOL/L — SIGNIFICANT CHANGE UP (ref 22–31)
CREAT SERPL-MCNC: 0.68 MG/DL — SIGNIFICANT CHANGE UP (ref 0.5–1.3)
EGFR: 87 ML/MIN/1.73M2 — SIGNIFICANT CHANGE UP
GLUCOSE SERPL-MCNC: 94 MG/DL — SIGNIFICANT CHANGE UP (ref 70–99)
HCT VFR BLD CALC: 34.8 % — SIGNIFICANT CHANGE UP (ref 34.5–45)
HGB BLD-MCNC: 11.4 G/DL — LOW (ref 11.5–15.5)
MAGNESIUM SERPL-MCNC: 2.2 MG/DL — SIGNIFICANT CHANGE UP (ref 1.6–2.6)
MCHC RBC-ENTMCNC: 28.7 PG — SIGNIFICANT CHANGE UP (ref 27–34)
MCHC RBC-ENTMCNC: 32.8 GM/DL — SIGNIFICANT CHANGE UP (ref 32–36)
MCV RBC AUTO: 87.7 FL — SIGNIFICANT CHANGE UP (ref 80–100)
NRBC # BLD: 0 /100 WBCS — SIGNIFICANT CHANGE UP
NRBC # FLD: 0 K/UL — SIGNIFICANT CHANGE UP
PHOSPHATE SERPL-MCNC: 3.5 MG/DL — SIGNIFICANT CHANGE UP (ref 2.5–4.5)
PLATELET # BLD AUTO: 249 K/UL — SIGNIFICANT CHANGE UP (ref 150–400)
POTASSIUM SERPL-MCNC: 4.1 MMOL/L — SIGNIFICANT CHANGE UP (ref 3.5–5.3)
POTASSIUM SERPL-SCNC: 4.1 MMOL/L — SIGNIFICANT CHANGE UP (ref 3.5–5.3)
RBC # BLD: 3.97 M/UL — SIGNIFICANT CHANGE UP (ref 3.8–5.2)
RBC # FLD: 15.7 % — HIGH (ref 10.3–14.5)
SARS-COV-2 RNA SPEC QL NAA+PROBE: SIGNIFICANT CHANGE UP
SODIUM SERPL-SCNC: 144 MMOL/L — SIGNIFICANT CHANGE UP (ref 135–145)
WBC # BLD: 15.32 K/UL — HIGH (ref 3.8–10.5)
WBC # FLD AUTO: 15.32 K/UL — HIGH (ref 3.8–10.5)

## 2022-04-04 PROCEDURE — 99231 SBSQ HOSP IP/OBS SF/LOW 25: CPT

## 2022-04-04 RX ADMIN — PIPERACILLIN AND TAZOBACTAM 25 GRAM(S): 4; .5 INJECTION, POWDER, LYOPHILIZED, FOR SOLUTION INTRAVENOUS at 12:16

## 2022-04-04 RX ADMIN — MUPIROCIN 1 APPLICATION(S): 20 OINTMENT TOPICAL at 16:42

## 2022-04-04 RX ADMIN — PIPERACILLIN AND TAZOBACTAM 25 GRAM(S): 4; .5 INJECTION, POWDER, LYOPHILIZED, FOR SOLUTION INTRAVENOUS at 04:50

## 2022-04-04 RX ADMIN — ENOXAPARIN SODIUM 30 MILLIGRAM(S): 100 INJECTION SUBCUTANEOUS at 05:10

## 2022-04-04 RX ADMIN — MUPIROCIN 1 APPLICATION(S): 20 OINTMENT TOPICAL at 05:09

## 2022-04-04 RX ADMIN — Medication 1 TABLET(S): at 12:16

## 2022-04-04 RX ADMIN — Medication 650 MILLIGRAM(S): at 14:01

## 2022-04-04 RX ADMIN — Medication 500 MILLIGRAM(S): at 12:16

## 2022-04-04 RX ADMIN — PIPERACILLIN AND TAZOBACTAM 25 GRAM(S): 4; .5 INJECTION, POWDER, LYOPHILIZED, FOR SOLUTION INTRAVENOUS at 20:47

## 2022-04-04 NOTE — PROGRESS NOTE ADULT - SUBJECTIVE AND OBJECTIVE BOX
Date of Service  : 04-04-22     INTERVAL HPI/OVERNIGHT EVENTS: No new concerns.   Vital Signs Last 24 Hrs  T(C): 38 (04 Apr 2022 13:56), Max: 38 (04 Apr 2022 13:56)  T(F): 100.4 (04 Apr 2022 13:56), Max: 100.4 (04 Apr 2022 13:56)  HR: 110 (04 Apr 2022 13:21) (96 - 110)  BP: 132/67 (04 Apr 2022 13:21) (122/62 - 135/63)  BP(mean): --  RR: 18 (04 Apr 2022 13:21) (18 - 18)  SpO2: 100% (04 Apr 2022 13:21) (100% - 100%)  I&O's Summary    MEDICATIONS  (STANDING):  ascorbic acid 500 milliGRAM(s) Oral daily  enoxaparin Injectable 30 milliGRAM(s) SubCutaneous every 24 hours  influenza  Vaccine (HIGH DOSE) 0.7 milliLiter(s) IntraMuscular once  multivitamin 1 Tablet(s) Oral daily  mupirocin 2% Ointment 1 Application(s) Topical two times a day  piperacillin/tazobactam IVPB.. 3.375 Gram(s) IV Intermittent every 8 hours    MEDICATIONS  (PRN):  acetaminophen     Tablet .. 650 milliGRAM(s) Oral every 6 hours PRN Temp greater or equal to 38C (100.4F), Mild Pain (1 - 3)    LABS:                        11.4   15.32 )-----------( 249      ( 04 Apr 2022 07:04 )             34.8     04-04    144  |  108<H>  |  11  ----------------------------<  94  4.1   |  22  |  0.68    Ca    9.3      04 Apr 2022 07:04  Phos  3.5     04-04  Mg     2.20     04-04              RADIOLOGY & ADDITIONAL TESTS:    Consultant(s) Notes Reviewed:  [x ] YES  [ ] NO    PHYSICAL EXAM:  GENERAL: NAD, well-groomed, well-developed ,not in any distress ,  HEAD:  Atraumatic, Normocephalic  NECK: Supple, No JVD, Normal thyroid  NERVOUS SYSTEM:  Alert   CHEST/LUNG: Good air entry bilateral with no  rales, rhonchi, wheezing, or rubs  HEART: Regular rate and rhythm; No murmurs, rubs, or gallops  ABDOMEN: Soft, Nontender, Nondistended; Bowel sounds present  EXTREMITIES: No clubbing, cyanosis, or edema  Wounds dressed     Care Discussed with Consultants/Other Providers [ x] YES  [ ] NO

## 2022-04-04 NOTE — PROGRESS NOTE ADULT - SUBJECTIVE AND OBJECTIVE BOX
Follow Up:  decubiti, left thigh abscess    Interval History/ROS:  T max 100.4   kyaw Centeno at bedside    Allergies  No Known Drug Allergies  shellfish (Swelling)        ANTIMICROBIALS: piperacillin/tazobactam IVPB.. 3.375 every 8 hours            OTHER MEDS:  acetaminophen     Tablet .. 650 milliGRAM(s) Oral every 6 hours PRN  enoxaparin Injectable 30 milliGRAM(s) SubCutaneous every 24 hours  influenza  Vaccine (HIGH DOSE) 0.7 milliLiter(s) IntraMuscular once  mupirocin 2% Ointment 1 Application(s) Topical two times a day    Vital Signs Last 24 Hrs  T(F): 100.4 (04-04-22 @ 13:56), Max: 100.4 (04-04-22 @ 13:56)  HR: 110 (04-04-22 @ 13:21)  BP: 132/67 (04-04-22 @ 13:21)  RR: 18 (04-04-22 @ 13:21)  SpO2: 100% (04-04-22 @ 13:21) (100% - 100%)    PHYSICAL EXAM:  Constitutional: Not in acute distress, awake  Eyes: No icterus.  Oral cavity: Clear, no lesions  RS: no respiratory distress on RA  CVS: S1, S2   Abdomen: Soft. No guarding/rigidity/tenderness.  : no villanueva  Extremities: left hip dressing, heel dressing, boot  Skin: No rash  Neuro: minimal verbal                          11.4   15.32 )-----------( 249      ( 04 Apr 2022 07:04 )             34.8 04-04    144  |  108  |  11  ----------------------------<  94  4.1   |  22  |  0.68  Ca    9.3      04 Apr 2022 07:04Phos  3.5     04-04Mg     2.20     04-04            Urinalysis Basic - ( 28 Mar 2022 03:39 )    Color: Light Yellow / Appearance: Clear / SG: >1.050 / pH: x  Gluc: x / Ketone: Negative  / Bili: Negative / Urobili: <2 mg/dL   Blood: x / Protein: Trace / Nitrite: Negative   Leuk Esterase: Large / RBC: <5 /HPF / WBC 5-10 /HPF   Sq Epi: x / Non Sq Epi: 1 /HPF / Bacteria: Occasional        MICROBIOLOGY:    v  Catheterized Catheterized  03-28-22   No growth  --  --      .Blood Blood  03-27-22   No growth  --  --        RADIOLOGY:    rad< from: CT Pelvis w/ IV Cont (04.01.22 @ 11:35) >    ACC: [id number] EXAM:  CT PELVIS ONLY IC                          PROCEDURE DATE:  04/01/2022          INTERPRETATION:  CLINICAL INFORMATION: Sepsis. Known soft tissue   abscesses. Repeat imaging for possible drainage.    COMPARISON: CT pelvis 3/27/2022    CONTRAST/COMPLICATIONS:  IV Contrast: Omnipaque 350  180 cc administered   20 cc discarded  Oral Contrast: NONE  Complications: 90 cc of contrast extravasated into the patient's left   forearm peripheral IV. Patient was examined with soft tissue swelling   noted with no sensorimotor abnormality. Patient's nurse was informed at   the time.    PROCEDURE:  CT of the Pelvis was performed.  Sagittal and coronal reformats were performed.    FINDINGS:  BLADDER: Within normal limits.  REPRODUCTIVE ORGANS: Retroflexed uterus with calcified fibroids.  LYMPH NODES: No pelvic lymphadenopathy.    VISUALIZED PORTIONS:  ABDOMINAL ORGANS: Cholelithiasis. 3 mm nonobstructing stone in the left   kidney.  BOWEL: Colonic diverticulosis without diverticulitis. Moderate amount of   stool.  PERITONEUM: No ascites.  VESSELS: Atherosclerotic changes.  ABDOMINAL WALL: Redemonstration of left lateral thigh soft tissue defect   with adjacent rim-enhancing collection of fluid and locules of gas with   surrounding soft tissue edema. Interval decrease in fluid and gas   component of the collection with increased thickening of the rim of the   collection. Right lateral thigh low density structure measuring 3.3 x 1.3   x 3.4 cm, without significant interval change. There is adjacent soft   tissue stranding.  BONES: Degenerative changes. Status post internal fixation of the   proximal right femur. Extravasated contrast in the left forearm is noted.    IMPRESSION:  Left lateral thigh draining abscess with overall interval decrease in the   fluid component.  Grossly unchanged low density structure in the right lateral thigh with   no significant fluid component identified.    --- End of Report ---          KRISTAL GUERRERO MD; Resident Radiology  This document has been electronically signed.  MATHIEU MIGUEL MD; Attending Radiologist    < end of copied text >      rd< from: CT Pelvis w/ IV Cont (03.27.22 @ 17:23) >  ACC: [id number] EXAM:  CT PELVIS ONLY IC                          PROCEDURE DATE:  03/27/2022          INTERPRETATION:  CLINICAL INFORMATION: 81-year-old female with dementia   and left hip ulceration. History  bilateral hip soft tissue infection   December 2021    COMPARISON: Pelvic x-ray from 12/8/2021. CT 12/06/2021   CONTRAST/COMPLICATIONS:  IV Contrast: Omnipaque 350  90 cc administered   10 cc discarded  Oral Contrast: NONE  Complications: None reported at time of study completion    PROCEDURE:  CT of the Pelvis was performed.  Sagittal and coronal reformats were performed.    FINDINGS:  BLADDER: Within normal limits.  REPRODUCTIVE ORGANS: Retroflexed uterus with large calcified fibroid  LYMPH NODES: No pelvic lymphadenopathy.    VISUALIZED PORTIONS:  ABDOMINAL ORGANS: Cholelithiasis.  BOWEL: Distended rectum with feces  PERITONEUM: No ascites.  VESSELS: Within normal limits.  ABDOMINAL WALL: Left lateral thigh soft tissue defect with adjacent   rim-enhancing collection of fluid andlocules of gas with associated soft   tissue infiltration, compatible with draining abscess. In the right thigh   soft tissues there is a heterogeneous inflammatory mass measuring 3.6 x   1.5 x 2.3 cm with rim enhancement which may represent phlegmonand a site   of fat necrosis (300-79).  BONES: Right hip ORIF. Asymmetric sclerosis of the right acetabulum.   Degenerative changes of the spine.    IMPRESSION:    Open soft tissue abscess in the left thigh as above.  Additional inflammatory mass in the right thigh soft tissues with rim   enhancement and adjacent fat stranding, suspicious for developing abscess.    --- End of Report ---          JOANNE DAWKINS MD; Resident Radiologist  This document has been electronically signed.  VALDO MIR MD; Attending Radiologist  This document has been electronically signed. Mar 27 2022  6:32PM    < end of copied text >

## 2022-04-04 NOTE — CHART NOTE - NSCHARTNOTEFT_GEN_A_CORE
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     At time of visit, Pt awake, appears confused/disoriented; private aide at bed side answered questions during interview. Per aide, Pt eats well/Pt with good appetite; Pt needs to be fed. Of note, Pt passed Swallow Bedside Assessment Adult, SLP rec: Soft and bite sized with thin liquids (3/28). No report of nausea, vomiting or diarrhea @ this time. +BM (3/31) fecal incontinence, per flow sheet. ?Pt with constipation? Bowel Regimen per MD discretion.     Of note, Prosource Gelatein Plus - 2x daily, ordered -> but not available in house. Rec to change to No Carb Prosource - 2x daily to aide in wound healing. Case discussed with nurse. RDN remains available.     Pt's height: 157.5 cm (3/27)     Pt's weight: 45.2 kg (3/28)   Pertinent Medications: Lovenox, Vit C, Multivitamin,   Pertinent Labs: (4/4) WBC 15.32 H, Hbg 11.4 L, Cl 108 H;    (3/27) Albumin 3.4   Skin: per flow sheet, Pt with pressure injuries to R hip - suspected deep tissue injury; L hip - unstageable; sacrum - stage II     Estimated Needs: [x] no change since previous assessment  Previous Nutrition Diagnosis: [x] Malnutrition, severe    Nutrition Diagnosis is [x] ongoing     Nutrition Interventions/Recommendations:   1. Change protein supplement Prosource Gelatein Plus - 2x daily to No Carb Prosource (1 pkg = 15 gms Protein) Qty per day: 2;  2. Encourage & assist Pt with meals; Monitor PO diet tolerance;  3. Continue Multivitamin as ordered;   4. Monitor labs, weekly weights, hydration status;

## 2022-04-05 LAB
ANION GAP SERPL CALC-SCNC: 13 MMOL/L — SIGNIFICANT CHANGE UP (ref 7–14)
BUN SERPL-MCNC: 12 MG/DL — SIGNIFICANT CHANGE UP (ref 7–23)
CALCIUM SERPL-MCNC: 8.9 MG/DL — SIGNIFICANT CHANGE UP (ref 8.4–10.5)
CHLORIDE SERPL-SCNC: 105 MMOL/L — SIGNIFICANT CHANGE UP (ref 98–107)
CO2 SERPL-SCNC: 22 MMOL/L — SIGNIFICANT CHANGE UP (ref 22–31)
CREAT SERPL-MCNC: 0.61 MG/DL — SIGNIFICANT CHANGE UP (ref 0.5–1.3)
EGFR: 90 ML/MIN/1.73M2 — SIGNIFICANT CHANGE UP
GLUCOSE SERPL-MCNC: 88 MG/DL — SIGNIFICANT CHANGE UP (ref 70–99)
HCT VFR BLD CALC: 34.7 % — SIGNIFICANT CHANGE UP (ref 34.5–45)
HGB BLD-MCNC: 11.9 G/DL — SIGNIFICANT CHANGE UP (ref 11.5–15.5)
MCHC RBC-ENTMCNC: 30.2 PG — SIGNIFICANT CHANGE UP (ref 27–34)
MCHC RBC-ENTMCNC: 34.3 GM/DL — SIGNIFICANT CHANGE UP (ref 32–36)
MCV RBC AUTO: 88.1 FL — SIGNIFICANT CHANGE UP (ref 80–100)
NRBC # BLD: 0 /100 WBCS — SIGNIFICANT CHANGE UP
NRBC # FLD: 0 K/UL — SIGNIFICANT CHANGE UP
PLATELET # BLD AUTO: 216 K/UL — SIGNIFICANT CHANGE UP (ref 150–400)
POTASSIUM SERPL-MCNC: 3.9 MMOL/L — SIGNIFICANT CHANGE UP (ref 3.5–5.3)
POTASSIUM SERPL-SCNC: 3.9 MMOL/L — SIGNIFICANT CHANGE UP (ref 3.5–5.3)
RBC # BLD: 3.94 M/UL — SIGNIFICANT CHANGE UP (ref 3.8–5.2)
RBC # FLD: 15.6 % — HIGH (ref 10.3–14.5)
SODIUM SERPL-SCNC: 140 MMOL/L — SIGNIFICANT CHANGE UP (ref 135–145)
WBC # BLD: 11.67 K/UL — HIGH (ref 3.8–10.5)
WBC # FLD AUTO: 11.67 K/UL — HIGH (ref 3.8–10.5)

## 2022-04-05 RX ADMIN — MUPIROCIN 1 APPLICATION(S): 20 OINTMENT TOPICAL at 05:17

## 2022-04-05 RX ADMIN — ENOXAPARIN SODIUM 30 MILLIGRAM(S): 100 INJECTION SUBCUTANEOUS at 05:17

## 2022-04-05 RX ADMIN — Medication 500 MILLIGRAM(S): at 13:56

## 2022-04-05 RX ADMIN — PIPERACILLIN AND TAZOBACTAM 25 GRAM(S): 4; .5 INJECTION, POWDER, LYOPHILIZED, FOR SOLUTION INTRAVENOUS at 14:00

## 2022-04-05 RX ADMIN — Medication 1 TABLET(S): at 13:56

## 2022-04-05 RX ADMIN — MUPIROCIN 1 APPLICATION(S): 20 OINTMENT TOPICAL at 17:21

## 2022-04-05 RX ADMIN — PIPERACILLIN AND TAZOBACTAM 25 GRAM(S): 4; .5 INJECTION, POWDER, LYOPHILIZED, FOR SOLUTION INTRAVENOUS at 21:21

## 2022-04-05 RX ADMIN — PIPERACILLIN AND TAZOBACTAM 25 GRAM(S): 4; .5 INJECTION, POWDER, LYOPHILIZED, FOR SOLUTION INTRAVENOUS at 04:39

## 2022-04-05 NOTE — CHART NOTE - NSCHARTNOTEFT_GEN_A_CORE
PROGRESS NOTE FOR Rachell Dunaway MRN [id number]  1940    NEED FOR HOSPITAL BED    The patient Rachell Dunaway has medical conditions (Thigh abscess L02.419. Sepsis A41.9. HTN I10. Hypernatremia E87.0. Unstageable pressure ulcer L89.95. Dementia F03.90. h/o CVA Z86.73) which require positioning of the body in ways not feasible with an ordinary bed, and require frequent changes in body position. Gel mattress needed to prevent further skin breakdown. Head of bed must elevate 30 degrees due to dysphagia diagnosis.

## 2022-04-06 RX ADMIN — Medication 500 MILLIGRAM(S): at 12:34

## 2022-04-06 RX ADMIN — PIPERACILLIN AND TAZOBACTAM 25 GRAM(S): 4; .5 INJECTION, POWDER, LYOPHILIZED, FOR SOLUTION INTRAVENOUS at 05:21

## 2022-04-06 RX ADMIN — Medication 1 TABLET(S): at 12:34

## 2022-04-06 RX ADMIN — PIPERACILLIN AND TAZOBACTAM 25 GRAM(S): 4; .5 INJECTION, POWDER, LYOPHILIZED, FOR SOLUTION INTRAVENOUS at 12:34

## 2022-04-06 RX ADMIN — MUPIROCIN 1 APPLICATION(S): 20 OINTMENT TOPICAL at 17:29

## 2022-04-06 RX ADMIN — ENOXAPARIN SODIUM 30 MILLIGRAM(S): 100 INJECTION SUBCUTANEOUS at 05:21

## 2022-04-06 RX ADMIN — MUPIROCIN 1 APPLICATION(S): 20 OINTMENT TOPICAL at 05:21

## 2022-04-06 NOTE — PROGRESS NOTE ADULT - ASSESSMENT
80 yo F with a history of HTN, CVA w/o focal residual deficits, Alzheimer's dementia (AOx1 at baseline) who presents with worsening left heel wound found to have a left thigh abscess and likely right thigh abscess     Problem/Plan - 1:  ·  Problem: Right Hip wound with Left Hip driaing abscess Thigh abscess.   ·  Plan: Leucocytosis  and low grade fever .   -CRP 93.6 and ESR 56  -s/p vanc/zosyn in ED - continue Zosyn.   -Ortho and IR input appreciated.   -ID help appreciated.   < from: CT Pelvis w/ IV Cont (04.01.22 @ 11:35) >  IMPRESSION:  Left lateral thigh draining abscess with overall interval decrease in the   fluid component.  Grossly unchanged low density structure in the right lateral thigh with   no significant fluid component identified.    < end of copied text >      < from: CT Pelvis w/ IV Cont (03.27.22 @ 17:23) >  IMPRESSION:    Open soft tissue abscess in the left thigh as above.  Additional inflammatory mass in the right thigh soft tissues with rim   enhancement and adjacent fat stranding, suspicious for developing abscess.    < end of copied text >  < from: CT Abdomen and Pelvis w/ IV Cont (12.06.21 @ 14:47) >    IMPRESSION:  Status post right femoral ORIF. Right hip soft tissue defect with subcutaneous infiltration and edema of the right hip and medial gluteal subcutaneous tissues.    Rim-enhancing collection overlying the left greater trochanter with adjacent subcutaneous edema. Differential includes bursitis or infection. Pelvic MRI may be helpful for further evaluation.    Fibroid uterus. Question endometrial thickening. Nonemergent pelvic ultrasound can be performed for further evaluation.    --- End of Report ---    < end of copied text >     Problem/Plan - 2:  ·  Problem: Sepsis.   ·  Plan: Tachycardia to 100 and leukocytosis with thigh abscesses. Lactate normal  -c/w vanc/zosyn and now on Zosyn x10 days.   -MRI  -f/u BCx x2  -obtain UA UCx.     Problem/Plan - 3:  ·  Problem: Wound of skin.   ·  Plan:     - Wound care help appreciated.    - dietary consult     Problem/Plan - 4:  ·  Problem: Hypernatremia.   ·  Plan: Trending Na.      Problem/Plan - 5:  ·  Problem: Dementia.   ·  Plan: baseline dementia, AOx1 - patient has been at baseline mental status per family     - Will continue donepezil 10mg daily.     Problem/Plan - 6:  ·  Problem: H/O: CVA (cerebrovascular accident).   ·  Plan: Hx of stroke in 2006, on statin    will continue atorvastatin 40mg daily.  Clarify why not on ASA.     Problem/Plan - 7:  ·  Problem: Hypertension.   ·  Plan: c/w home carvedilol 3.125mg BID.     Problem/Plan - 8:  ·  Problem: Prophylactic measure.   ·  Plan: DVT prophylaxis: lovenox 40mg daily    Dispo : DC planning pending completion of Abxs.     D/w Alma Rosa Daughter in detail .           
80 yo F with a history of HTN, CVA w/o focal residual deficits, Alzheimer's dementia (AOx1 at baseline) who presents with worsening left heel wound found to have a left thigh abscess and likely right thigh abscess     Problem/Plan - 1:  ·  Problem: Right Hip wound with Left Hip driaing abscess Thigh abscess.   ·  Plan: Leucocytosis .  -CRP 93.6 and ESR 56  -s/p vanc/zosyn in ED - continue Zosyn.   -Ortho and IR input appreciated.   -ID help appreciated.   < from: CT Pelvis w/ IV Cont (04.01.22 @ 11:35) >  IMPRESSION:  Left lateral thigh draining abscess with overall interval decrease in the   fluid component.  Grossly unchanged low density structure in the right lateral thigh with   no significant fluid component identified.    < end of copied text >      < from: CT Pelvis w/ IV Cont (03.27.22 @ 17:23) >  IMPRESSION:    Open soft tissue abscess in the left thigh as above.  Additional inflammatory mass in the right thigh soft tissues with rim   enhancement and adjacent fat stranding, suspicious for developing abscess.    < end of copied text >  < from: CT Abdomen and Pelvis w/ IV Cont (12.06.21 @ 14:47) >    IMPRESSION:  Status post right femoral ORIF. Right hip soft tissue defect with subcutaneous infiltration and edema of the right hip and medial gluteal subcutaneous tissues.    Rim-enhancing collection overlying the left greater trochanter with adjacent subcutaneous edema. Differential includes bursitis or infection. Pelvic MRI may be helpful for further evaluation.    Fibroid uterus. Question endometrial thickening. Nonemergent pelvic ultrasound can be performed for further evaluation.    --- End of Report ---    < end of copied text >     Problem/Plan - 2:  ·  Problem: Sepsis.   ·  Plan: Tachycardia to 100 and leukocytosis with thigh abscesses. Lactate normal  -c/w vanc/zosyn and now on Zosyn x10 days.   -MRI  -f/u BCx x2  -obtain UA UCx.     Problem/Plan - 3:  ·  Problem: Wound of skin.   ·  Plan:     - Wound care help appreciated.    - dietary consult     Problem/Plan - 4:  ·  Problem: Hypernatremia.   ·  Plan: Trending Na.      Problem/Plan - 5:  ·  Problem: Dementia.   ·  Plan: baseline dementia, AOx1 - patient has been at baseline mental status per family     - Will continue donepezil 10mg daily.     Problem/Plan - 6:  ·  Problem: H/O: CVA (cerebrovascular accident).   ·  Plan: Hx of stroke in 2006, on statin    will continue atorvastatin 40mg daily.  Clarify why not on ASA.     Problem/Plan - 7:  ·  Problem: Hypertension.   ·  Plan: c/w home carvedilol 3.125mg BID.     Problem/Plan - 8:  ·  Problem: Prophylactic measure.   ·  Plan: DVT prophylaxis: lovenox 40mg daily  Diet: puree diet pending S&S eval. On last admission was on soft and bite sized diet with mildly thick liquids  Dispo: Home pending PT.       Dispo : DC planning pending completion of Abxs.           
82 yo F with a history of HTN, CVA w/o focal residual deficits, Alzheimer's dementia (AOx1 at baseline) who presents with worsening left heel wound found to have a left thigh abscess and likely right thigh abscess     Problem/Plan - 1:  ·  Problem: Right Hip wound with Left Hip driaing abscess Thigh abscess.   ·  Plan: Leucocytosis  and low grade fever .   -CRP 93.6 and ESR 56  -s/p vanc/zosyn in ED - continue Zosyn.   -Ortho and IR input appreciated.   -ID help appreciated.   < from: CT Pelvis w/ IV Cont (04.01.22 @ 11:35) >  IMPRESSION:  Left lateral thigh draining abscess with overall interval decrease in the   fluid component.  Grossly unchanged low density structure in the right lateral thigh with   no significant fluid component identified.    < end of copied text >      < from: CT Pelvis w/ IV Cont (03.27.22 @ 17:23) >  IMPRESSION:    Open soft tissue abscess in the left thigh as above.  Additional inflammatory mass in the right thigh soft tissues with rim   enhancement and adjacent fat stranding, suspicious for developing abscess.    < end of copied text >  < from: CT Abdomen and Pelvis w/ IV Cont (12.06.21 @ 14:47) >    IMPRESSION:  Status post right femoral ORIF. Right hip soft tissue defect with subcutaneous infiltration and edema of the right hip and medial gluteal subcutaneous tissues.    Rim-enhancing collection overlying the left greater trochanter with adjacent subcutaneous edema. Differential includes bursitis or infection. Pelvic MRI may be helpful for further evaluation.    Fibroid uterus. Question endometrial thickening. Nonemergent pelvic ultrasound can be performed for further evaluation.    --- End of Report ---    < end of copied text >     Problem/Plan - 2:  ·  Problem: Sepsis.   ·  Plan: Tachycardia to 100 and leukocytosis with thigh abscesses. Lactate normal  -c/w vanc/zosyn and now on Zosyn x10 days.   -MRI  -f/u BCx x2  -obtain UA UCx.     Problem/Plan - 3:  ·  Problem: Wound of skin.   ·  Plan:     - Wound care help appreciated.    - dietary consult     Problem/Plan - 4:  ·  Problem: Hypernatremia.   ·  Plan: Trending Na.      Problem/Plan - 5:  ·  Problem: Dementia.   ·  Plan: baseline dementia, AOx1 - patient has been at baseline mental status per family     - Will continue donepezil 10mg daily.     Problem/Plan - 6:  ·  Problem: H/O: CVA (cerebrovascular accident).   ·  Plan: Hx of stroke in 2006, on statin    will continue atorvastatin 40mg daily.  Clarify why not on ASA.     Problem/Plan - 7:  ·  Problem: Hypertension.   ·  Plan: c/w home carvedilol 3.125mg BID.     Problem/Plan - 8:  ·  Problem: Prophylactic measure.   ·  Plan: DVT prophylaxis: lovenox 40mg daily    Dispo : DC planning pending completion of Abxs.                   
82yo F w/ left lateral heel blister  - pt seen and evaluated  - afebrile, WBC 10.74  - left foot lateral heel flaccid blister, no drainage, no erythema, no malodor, no probing wound, no acute signs of infection   - right foot no open wounds or lesions  - left foot xray negative for OM, negative for gas  - continue offloading b/l heels w/ z-flow boots at all times while in bed  - no acute podiatric intervention necessary, recommend continue LWC w/ mupirocin and allevyn pad daily   - instructions for discharge in provider note under follow up  - seen w/ attending   
80 yo F with a history of HTN, CVA w/o focal residual deficits, Alzheimer's dementia (AOx1 at baseline) who presents with worsening left heel wound found to have a left thigh abscess and likely right thigh abscess     Problem/Plan - 1:  ·  Problem: Right Hip wound with Left Hip driaing abscess Thigh abscess.   ·  Plan: Rpt CT scan pending.   -CRP 93.6 and ESR 56  -s/p vanc/zosyn in ED - continue Zosyn.   -Ortho and IR input appreciated.   -ID help appreciated.   < from: CT Pelvis w/ IV Cont (03.27.22 @ 17:23) >  IMPRESSION:    Open soft tissue abscess in the left thigh as above.  Additional inflammatory mass in the right thigh soft tissues with rim   enhancement and adjacent fat stranding, suspicious for developing abscess.    < end of copied text >  < from: CT Abdomen and Pelvis w/ IV Cont (12.06.21 @ 14:47) >    IMPRESSION:  Status post right femoral ORIF. Right hip soft tissue defect with subcutaneous infiltration and edema of the right hip and medial gluteal subcutaneous tissues.    Rim-enhancing collection overlying the left greater trochanter with adjacent subcutaneous edema. Differential includes bursitis or infection. Pelvic MRI may be helpful for further evaluation.    Fibroid uterus. Question endometrial thickening. Nonemergent pelvic ultrasound can be performed for further evaluation.    --- End of Report ---    < end of copied text >     Problem/Plan - 2:  ·  Problem: Sepsis.   ·  Plan: Tachycardia to 100 and leukocytosis with thigh abscesses. Lactate normal  -c/w vanc/zosyn and now on Zosyn.   -MRI  -f/u BCx x2  -obtain UA UCx.     Problem/Plan - 3:  ·  Problem: Wound of skin.   ·  Plan:     - Wound care help appreciated.    - dietary consult     Problem/Plan - 4:  ·  Problem: Hypernatremia.   ·  Plan: Trending Na.      Problem/Plan - 5:  ·  Problem: Dementia.   ·  Plan: baseline dementia, AOx1 - patient has been at baseline mental status per family     - Will continue donepezil 10mg daily.     Problem/Plan - 6:  ·  Problem: H/O: CVA (cerebrovascular accident).   ·  Plan: Hx of stroke in 2006, on statin    will continue atorvastatin 40mg daily.  Clarify why not on ASA.     Problem/Plan - 7:  ·  Problem: Hypertension.   ·  Plan: c/w home carvedilol 3.125mg BID.     Problem/Plan - 8:  ·  Problem: Prophylactic measure.   ·  Plan: DVT prophylaxis: lovenox 40mg daily  Diet: puree diet pending S&S eval. On last admission was on soft and bite sized diet with mildly thick liquids  Dispo: Home pending PT.     D/W Family in room    
80 yo woman with h/o CVA, dementia, h/o right hip ORIF presenting with non healing wound over left hip, left heel.  Afebrile.  Pelvic x ray shows hardware alignment unchanged, right hip joint space grossly maintained.    CT pelvis shows open soft tissue abscess left thigh (rim enhancing collection of fluid and locules of gas) c/w draining abscess and right thigh inflammatory mass  3 x 2 x 1 cm which may represent phlegmon and fat necrosis.  Blood cultures no growth.  Urine culture no growth.    Wounds evaluated by wound care service 3/30    Lt hip ulcer with CT 3/27 showing evidence of draining abscess.  repeat CT 4/1 shows decrease in size of draining abscess.      Rt hip ulcer does not appear infected.    s/p rt hip ORIF; pelvic x ray not suggestive of hardware infection.  Ortho evaluation appreciated  Dementia      Suggest;    c/w zosyn q 8 h  --> 4/6  (10 days duration)  if d/c prior can transition to augmentin po    Rachell Dunbar MD  Pager: [phone]  After 5 PM or weekends please call fellow on call or office [phone]     
80 yo woman with h/o CVA, dementia, h/o right hip ORIF presenting with non healing wound over left hip, left heel.  Afebrile.  Pelvic x ray shows hardware alignment unchanged, right hip joint space grossly maintained.    CT pelvis shows open soft tissue abscess left thigh (rim enhancing collection of fluid and locules of gas) c/w draining abscess and right thigh inflammatory mass  3 x 2 x 1 cm which may represent phlegmon and fat necrosis.  Blood cultures no growth.  Urine culture no growth.    Wounds evaluated by wound care service today.    Lt hip ulcer with CT evidence draining abscess and right thigh phlegmon.  On exam right hip ulcer does not appear infected.  s/p rt hip ORIF; pelvic x ray not suggestive of hardware infection.  Ortho evaluating.  Dementia      Suggest;    c/w zosyn q 8 h    d/c vanco    will follow    Rachell Dunbar MD  Pager: [phone]  After 5 PM or weekends please call fellow on call or office [phone]     
80 yo F with a history of HTN, CVA w/o focal residual deficits, Alzheimer's dementia (AOx1 at baseline) who presents with worsening left heel wound found to have a left thigh abscess and likely right thigh abscess     Problem/Plan - 1:  ·  Problem: Right Hip wound with Left Hip driaing abscess Thigh abscess.   ·  Plan: Rpt CT scan pending.   -CRP 93.6 and ESR 56  -s/p vanc/zosyn in ED - continue Zosyn.   -Ortho and IR input appreciated.   -ID help appreciated.   < from: CT Pelvis w/ IV Cont (03.27.22 @ 17:23) >  IMPRESSION:    Open soft tissue abscess in the left thigh as above.  Additional inflammatory mass in the right thigh soft tissues with rim   enhancement and adjacent fat stranding, suspicious for developing abscess.    < end of copied text >  < from: CT Abdomen and Pelvis w/ IV Cont (12.06.21 @ 14:47) >    IMPRESSION:  Status post right femoral ORIF. Right hip soft tissue defect with subcutaneous infiltration and edema of the right hip and medial gluteal subcutaneous tissues.    Rim-enhancing collection overlying the left greater trochanter with adjacent subcutaneous edema. Differential includes bursitis or infection. Pelvic MRI may be helpful for further evaluation.    Fibroid uterus. Question endometrial thickening. Nonemergent pelvic ultrasound can be performed for further evaluation.    --- End of Report ---    < end of copied text >     Problem/Plan - 2:  ·  Problem: Sepsis.   ·  Plan: Tachycardia to 100 and leukocytosis with thigh abscesses. Lactate normal  -c/w vanc/zosyn and now on Zosyn x10 days.   -MRI  -f/u BCx x2  -obtain UA UCx.     Problem/Plan - 3:  ·  Problem: Wound of skin.   ·  Plan:     - Wound care help appreciated.    - dietary consult     Problem/Plan - 4:  ·  Problem: Hypernatremia.   ·  Plan: Trending Na.      Problem/Plan - 5:  ·  Problem: Dementia.   ·  Plan: baseline dementia, AOx1 - patient has been at baseline mental status per family     - Will continue donepezil 10mg daily.     Problem/Plan - 6:  ·  Problem: H/O: CVA (cerebrovascular accident).   ·  Plan: Hx of stroke in 2006, on statin    will continue atorvastatin 40mg daily.  Clarify why not on ASA.     Problem/Plan - 7:  ·  Problem: Hypertension.   ·  Plan: c/w home carvedilol 3.125mg BID.     Problem/Plan - 8:  ·  Problem: Prophylactic measure.   ·  Plan: DVT prophylaxis: lovenox 40mg daily  Diet: puree diet pending S&S eval. On last admission was on soft and bite sized diet with mildly thick liquids  Dispo: Home pending PT.     D/W Family in room      Dispo : DC planning pending completion of Abxs.   
80 yo woman with h/o CVA, dementia, h/o right hip ORIF presenting with non healing wound over left hip, left heel.  Afebrile.  Pelvic x ray shows hardware alignment unchanged, right hip joint space grossly maintained.    CT pelvis shows open soft tissue abscess left thigh (rim enhancing collection of fluid and locules of gas) c/w draining abscess and right thigh inflammatory mass  3 x 2 x 1 cm which may represent phlegmon and fat necrosis.  Blood cultures no growth.  Urine culture no growth.    Wounds evaluated by wound care service 3/30    Lt hip ulcer with CT 3/27 showing evidence of draining abscess.  repeat CT 4/1 shows decrease in size of draining abscess.      Rt hip ulcer does not appear infected.    s/p rt hip ORIF; pelvic x ray not suggestive of hardware infection.  Ortho evaluation appreciated  Dementia      Suggest;  monitor temp  c/w zosyn q 8 h  --> 4/6  (10 days duration)      Rachell Dunbar MD  Pager: [phone]  After 5 PM or weekends please call fellow on call or office [phone]     
80 yo woman with h/o CVA, dementia, h/o right hip ORIF presenting with non healing wound over left hip, left heel.  Afebrile.  Pelvic x ray shows hardware alignment unchanged, right hip joint space grossly maintained.    CT pelvis shows open soft tissue abscess left thigh (rim enhancing collection of fluid and locules of gas) c/w draining abscess and right thigh inflammatory mass  3 x 2 x 1 cm which may represent phlegmon and fat necrosis.  Blood cultures no growth.  Urine culture no growth.    Wounds evaluated by wound care service 3/30    Lt hip ulcer with CT evidence draining abscess and right thigh phlegmon.  On exam right hip ulcer does not appear infected.  s/p rt hip ORIF; pelvic x ray not suggestive of hardware infection.  Ortho evaluation appreciated  Dementia      Suggest;    c/w zosyn q 8 h x total 10 day course    Rachell Dnubar MD  Pager: [phone]  After 5 PM or weekends please call fellow on call or office [phone]     
82 yo F with a history of HTN, CVA w/o focal residual deficits, Alzheimer's dementia (AOx1 at baseline) who presents with worsening left heel wound found to have a left thigh abscess and likely right thigh abscess     Problem/Plan - 1:  ·  Problem: Right Hip wound with Left Hip driaing abscess Thigh abscess.   ·  Plan: Leucocytosis  and low grade fever resolved. .   -CRP 93.6 and ESR 56  -s/p vanc/zosyn in ED - continue Zosyn.   -Ortho and IR input appreciated.   -ID help appreciated.   < from: CT Pelvis w/ IV Cont (04.01.22 @ 11:35) >  IMPRESSION:  Left lateral thigh draining abscess with overall interval decrease in the   fluid component.  Grossly unchanged low density structure in the right lateral thigh with   no significant fluid component identified.    < end of copied text >      < from: CT Pelvis w/ IV Cont (03.27.22 @ 17:23) >  IMPRESSION:    Open soft tissue abscess in the left thigh as above.  Additional inflammatory mass in the right thigh soft tissues with rim   enhancement and adjacent fat stranding, suspicious for developing abscess.    < end of copied text >  < from: CT Abdomen and Pelvis w/ IV Cont (12.06.21 @ 14:47) >    IMPRESSION:  Status post right femoral ORIF. Right hip soft tissue defect with subcutaneous infiltration and edema of the right hip and medial gluteal subcutaneous tissues.    Rim-enhancing collection overlying the left greater trochanter with adjacent subcutaneous edema. Differential includes bursitis or infection. Pelvic MRI may be helpful for further evaluation.    Fibroid uterus. Question endometrial thickening. Nonemergent pelvic ultrasound can be performed for further evaluation.    --- End of Report ---    < end of copied text >     Problem/Plan - 2:  ·  Problem: Sepsis.   ·  Plan: Tachycardia to 100 and leukocytosis with thigh abscesses. Lactate normal  -c/w vanc/zosyn and now finished  Zosyn x10 days.   -MRI  -f/u BCx x2  -obtain UA UCx.     Problem/Plan - 3:  ·  Problem: Wound of skin.   ·  Plan:     - Wound care help appreciated.    - dietary consult     Problem/Plan - 4:  ·  Problem: Hypernatremia.   ·  Plan: Trending Na.      Problem/Plan - 5:  ·  Problem: Dementia.   ·  Plan: baseline dementia, AOx1 - patient has been at baseline mental status per family     - Will continue donepezil 10mg daily.     Problem/Plan - 6:  ·  Problem: H/O: CVA (cerebrovascular accident).   ·  Plan: Hx of stroke in 2006, on statin    will continue atorvastatin 40mg daily.  Clarify why not on ASA.     Problem/Plan - 7:  ·  Problem: Hypertension.   ·  Plan: c/w home carvedilol 3.125mg BID.     Problem/Plan - 8:  ·  Problem: Prophylactic measure.   ·  Plan: DVT prophylaxis: lovenox 40mg daily    Dispo : DC planning .     
82 yo F with a history of HTN, CVA w/o focal residual deficits, Alzheimer's dementia (AOx1 at baseline) who presents with worsening left heel wound found to have a left thigh abscess and likely right thigh abscess     Problem/Plan - 1:  ·  Problem: Right Hip wound with Left Hip driaing abscess Thigh abscess.   ·  Plan: Rpt CT scan pending.   -CRP 93.6 and ESR 56  -s/p vanc/zosyn in ED - continue Zosyn.   -Ortho and IR input appreciated.   -ID help appreciated.   < from: CT Pelvis w/ IV Cont (03.27.22 @ 17:23) >  IMPRESSION:    Open soft tissue abscess in the left thigh as above.  Additional inflammatory mass in the right thigh soft tissues with rim   enhancement and adjacent fat stranding, suspicious for developing abscess.    < end of copied text >  < from: CT Abdomen and Pelvis w/ IV Cont (12.06.21 @ 14:47) >    IMPRESSION:  Status post right femoral ORIF. Right hip soft tissue defect with subcutaneous infiltration and edema of the right hip and medial gluteal subcutaneous tissues.    Rim-enhancing collection overlying the left greater trochanter with adjacent subcutaneous edema. Differential includes bursitis or infection. Pelvic MRI may be helpful for further evaluation.    Fibroid uterus. Question endometrial thickening. Nonemergent pelvic ultrasound can be performed for further evaluation.    --- End of Report ---    < end of copied text >     Problem/Plan - 2:  ·  Problem: Sepsis.   ·  Plan: Tachycardia to 100 and leukocytosis with thigh abscesses. Lactate normal  -c/w vanc/zosyn and now on Zosyn x10 days.   -MRI  -f/u BCx x2  -obtain UA UCx.     Problem/Plan - 3:  ·  Problem: Wound of skin.   ·  Plan:     - Wound care help appreciated.    - dietary consult     Problem/Plan - 4:  ·  Problem: Hypernatremia.   ·  Plan: Trending Na.      Problem/Plan - 5:  ·  Problem: Dementia.   ·  Plan: baseline dementia, AOx1 - patient has been at baseline mental status per family     - Will continue donepezil 10mg daily.     Problem/Plan - 6:  ·  Problem: H/O: CVA (cerebrovascular accident).   ·  Plan: Hx of stroke in 2006, on statin    will continue atorvastatin 40mg daily.  Clarify why not on ASA.     Problem/Plan - 7:  ·  Problem: Hypertension.   ·  Plan: c/w home carvedilol 3.125mg BID.     Problem/Plan - 8:  ·  Problem: Prophylactic measure.   ·  Plan: DVT prophylaxis: lovenox 40mg daily  Diet: puree diet pending S&S eval. On last admission was on soft and bite sized diet with mildly thick liquids  Dispo: Home pending PT.     D/W Family in room      
80 yo F with a history of HTN, CVA w/o focal residual deficits, Alzheimer's dementia (AOx1 at baseline) who presents with worsening left heel wound found to have a left thigh abscess and likely right thigh abscess     Problem/Plan - 1:  ·  Problem: Right Hip wound with Left Hip driaing abscess Thigh abscess.   ·  Plan: Leucocytosis .  -CRP 93.6 and ESR 56  -s/p vanc/zosyn in ED - continue Zosyn.   -Ortho and IR input appreciated.   -ID help appreciated.   < from: CT Pelvis w/ IV Cont (04.01.22 @ 11:35) >  IMPRESSION:  Left lateral thigh draining abscess with overall interval decrease in the   fluid component.  Grossly unchanged low density structure in the right lateral thigh with   no significant fluid component identified.    < end of copied text >      < from: CT Pelvis w/ IV Cont (03.27.22 @ 17:23) >  IMPRESSION:    Open soft tissue abscess in the left thigh as above.  Additional inflammatory mass in the right thigh soft tissues with rim   enhancement and adjacent fat stranding, suspicious for developing abscess.    < end of copied text >  < from: CT Abdomen and Pelvis w/ IV Cont (12.06.21 @ 14:47) >    IMPRESSION:  Status post right femoral ORIF. Right hip soft tissue defect with subcutaneous infiltration and edema of the right hip and medial gluteal subcutaneous tissues.    Rim-enhancing collection overlying the left greater trochanter with adjacent subcutaneous edema. Differential includes bursitis or infection. Pelvic MRI may be helpful for further evaluation.    Fibroid uterus. Question endometrial thickening. Nonemergent pelvic ultrasound can be performed for further evaluation.    --- End of Report ---    < end of copied text >     Problem/Plan - 2:  ·  Problem: Sepsis.   ·  Plan: Tachycardia to 100 and leukocytosis with thigh abscesses. Lactate normal  -c/w vanc/zosyn and now on Zosyn x10 days.   -MRI  -f/u BCx x2  -obtain UA UCx.     Problem/Plan - 3:  ·  Problem: Wound of skin.   ·  Plan:     - Wound care help appreciated.    - dietary consult     Problem/Plan - 4:  ·  Problem: Hypernatremia.   ·  Plan: Trending Na.      Problem/Plan - 5:  ·  Problem: Dementia.   ·  Plan: baseline dementia, AOx1 - patient has been at baseline mental status per family     - Will continue donepezil 10mg daily.     Problem/Plan - 6:  ·  Problem: H/O: CVA (cerebrovascular accident).   ·  Plan: Hx of stroke in 2006, on statin    will continue atorvastatin 40mg daily.  Clarify why not on ASA.     Problem/Plan - 7:  ·  Problem: Hypertension.   ·  Plan: c/w home carvedilol 3.125mg BID.     Problem/Plan - 8:  ·  Problem: Prophylactic measure.   ·  Plan: DVT prophylaxis: lovenox 40mg daily  Diet: puree diet pending S&S eval. On last admission was on soft and bite sized diet with mildly thick liquids  Dispo: Home pending PT.       Dispo : DC planning pending completion of Abxs.       
80 yo F with a history of HTN, CVA w/o focal residual deficits, Alzheimer's dementia (AOx1 at baseline) who presents with worsening left heel wound found to have a left thigh abscess and likely right thigh abscess     Problem/Plan - 1:  ·  Problem: Thigh abscess.   ·  Plan: CT imaging: open soft tissue abscess in the left thigh as above. Additional inflammatory mass in the right thigh soft tissues with rim enhancement and adjacent fat stranding, suspicious for developing abscess.  L thigh redness/TTP and upon wound with eschar base. No active drainage. Right hip wound with necrotic appearing skin, no drainage. Also TTP  Thigh abscesses likely bilateral and cannot r/o OM - pt is at risk for due to worsening pressure ulcers chronically and hx abscesses. Inflammatory markers elevated   -MRI pelvis with IV contrast ordered  -Not clear at this time if there is concern for right ORIF hardware infection. Consulted ortho to review imaging and see patient - will see patient  -CRP 93.6 and ESR 56  -s/p vanc/zosyn in ED - continue vanc/zosyn pending cultures  -f/u blood cultures  -IVF  -possible ID consult pending IR drainage and MRI  -IR consult placed to evaluate for drainage of abscesses.     Problem/Plan - 2:  ·  Problem: Sepsis.   ·  Plan: Tachycardia to 100 and leukocytosis with thigh abscesses. Lactate normal  -c/w vanc/zosyn  -MRI  -f/u BCx x2  -obtain UA UCx.     Problem/Plan - 3:  ·  Problem: Wound of skin.   ·  Plan: Assessment:   - left hip un-stageable, TTP, erythema, no drainage. Eschar base   - Right hip: not draining. Also TTP, no surrounding erythema   - Left shin: erythematous   - left heel with large blister- x-ray does not show osteo.   Plan:    - Wound care consult   - dietary consult  - podiatry consult in AM for L heel blister. MRI ordered to r/o OM as well.     Problem/Plan - 4:  ·  Problem: Hypernatremia.   ·  Plan: Sodium currently 149 most likely 2/2 hypovolemia   - will trend NA after fluid (NS 75 mL/ hr).     Problem/Plan - 5:  ·  Problem: Dementia.   ·  Plan: baseline dementia, AOx1 - patient has been at baseline mental status per family     - Will continue donepezil 10mg daily.     Problem/Plan - 6:  ·  Problem: H/O: CVA (cerebrovascular accident).   ·  Plan: Hx of stroke in 2006, on statin    will continue atorvastatin 40mg daily.  Clarify why not on ASA.     Problem/Plan - 7:  ·  Problem: Hypertension.   ·  Plan: c/w home carvedilol 3.125mg BID.     Problem/Plan - 8:  ·  Problem: Prophylactic measure.   ·  Plan: DVT prophylaxis: lovenox 40mg daily  Diet: puree diet pending S&S eval. On last admission was on soft and bite sized diet with mildly thick liquids  Dispo: Home pending PT.
80 yo F with a history of HTN, CVA w/o focal residual deficits, Alzheimer's dementia (AOx1 at baseline) who presents with worsening left heel wound found to have a left thigh abscess and likely right thigh abscess     Problem/Plan - 1:  ·  Problem: Thigh abscess.   ·  Plan: CT imaging: open soft tissue abscess in the left thigh as above. Additional inflammatory mass in the right thigh soft tissues with rim enhancement and adjacent fat stranding, suspicious for developing abscess.  L thigh redness/TTP and upon wound with eschar base. No active drainage. Right hip wound with necrotic appearing skin, no drainage. Also TTP  Thigh abscesses likely bilateral and cannot r/o OM - pt is at risk for due to worsening pressure ulcers chronically and hx abscesses. Inflammatory markers elevated   -Not clear at this time if there is concern for right ORIF hardware infection. Consulted ortho to review imaging and see patient - will see patient  -CRP 93.6 and ESR 56  -s/p vanc/zosyn in ED - continue vanc/zosyn pending cultures  -f/u blood cultures  -ID help appreciated.      Problem/Plan - 2:  ·  Problem: Sepsis.   ·  Plan: Tachycardia to 100 and leukocytosis with thigh abscesses. Lactate normal  -c/w vanc/zosyn  -MRI  -f/u BCx x2  -obtain UA UCx.     Problem/Plan - 3:  ·  Problem: Wound of skin.   ·  Plan: Assessment:   - left hip un-stageable, TTP, erythema, no drainage. Eschar base   - Right hip: not draining. Also TTP, no surrounding erythema   - Left shin: erythematous   - left heel with large blister- x-ray does not show osteo.   Plan:    - Wound care help appreciated.    - dietary consult  - podiatry consult in AM for L heel blister. MRI ordered to r/o OM as well.     Problem/Plan - 4:  ·  Problem: Hypernatremia.   ·  Plan: Sodium currently 149 most likely 2/2 hypovolemia   - will trend NA after fluid (NS 75 mL/ hr).     Problem/Plan - 5:  ·  Problem: Dementia.   ·  Plan: baseline dementia, AOx1 - patient has been at baseline mental status per family     - Will continue donepezil 10mg daily.     Problem/Plan - 6:  ·  Problem: H/O: CVA (cerebrovascular accident).   ·  Plan: Hx of stroke in 2006, on statin    will continue atorvastatin 40mg daily.  Clarify why not on ASA.     Problem/Plan - 7:  ·  Problem: Hypertension.   ·  Plan: c/w home carvedilol 3.125mg BID.     Problem/Plan - 8:  ·  Problem: Prophylactic measure.   ·  Plan: DVT prophylaxis: lovenox 40mg daily  Diet: puree diet pending S&S eval. On last admission was on soft and bite sized diet with mildly thick liquids  Dispo: Home pending PT.     D/W Family in room

## 2022-04-06 NOTE — PROGRESS NOTE ADULT - SUBJECTIVE AND OBJECTIVE BOX
Date of Service  : 04-06-22     INTERVAL HPI/OVERNIGHT EVENTS: Seen and examined earlier with family friend in room . She is fine and eating good.   Vital Signs Last 24 Hrs  T(C): 37.2 (06 Apr 2022 14:00), Max: 37.3 (06 Apr 2022 06:00)  T(F): 98.9 (06 Apr 2022 14:00), Max: 99.1 (06 Apr 2022 06:00)  HR: 92 (06 Apr 2022 14:00) (90 - 104)  BP: 138/78 (06 Apr 2022 14:00) (129/81 - 145/74)  BP(mean): --  RR: 18 (06 Apr 2022 14:00) (18 - 18)  SpO2: 98% (06 Apr 2022 14:00) (98% - 100%)  I&O's Summary    05 Apr 2022 07:01  -  06 Apr 2022 07:00  --------------------------------------------------------  IN: 100 mL / OUT: 0 mL / NET: 100 mL      MEDICATIONS  (STANDING):  ascorbic acid 500 milliGRAM(s) Oral daily  enoxaparin Injectable 30 milliGRAM(s) SubCutaneous every 24 hours  influenza  Vaccine (HIGH DOSE) 0.7 milliLiter(s) IntraMuscular once  multivitamin 1 Tablet(s) Oral daily  mupirocin 2% Ointment 1 Application(s) Topical two times a day    MEDICATIONS  (PRN):  acetaminophen     Tablet .. 650 milliGRAM(s) Oral every 6 hours PRN Temp greater or equal to 38C (100.4F), Mild Pain (1 - 3)    LABS:                        11.9   11.67 )-----------( 216      ( 05 Apr 2022 07:42 )             34.7     04-05    140  |  105  |  12  ----------------------------<  88  3.9   |  22  |  0.61    Ca    8.9      05 Apr 2022 07:42          CAPILLARY BLOOD GLUCOSE      Consultant(s) Notes Reviewed:  [x ] YES  [ ] NO    PHYSICAL EXAM:  GENERAL: NAD, well-groomed, well-developed,not in any distress ,  HEAD:  Atraumatic, Normocephalic  NECK: Supple, No JVD, Normal thyroid  NERVOUS SYSTEM:  Alert   CHEST/LUNG: Good air entry bilateral with no  rales, rhonchi, wheezing, or rubs  HEART: Regular rate and rhythm; No murmurs, rubs, or gallops  ABDOMEN: Soft, Nontender, Nondistended; Bowel sounds present  EXTREMITIES:  , No clubbing, cyanosis, or edema  SKIN: wounds dressed     Care Discussed with Consultants/Other Providers [ x] YES  [ ] NO

## 2022-04-06 NOTE — PROGRESS NOTE ADULT - NUTRITIONAL ASSESSMENT
This patient has been assessed with a concern for Malnutrition and has been determined to have a diagnosis/diagnoses of Severe protein-calorie malnutrition and Underweight (BMI < 19).    This patient is being managed with:   Diet DASH/TLC-  Sodium & Cholesterol Restricted  Soft and Bite Sized (SOFTBTSZ)  Entered: Mar 28 2022 12:24PM      This patient has been assessed with a concern for Malnutrition and has been determined to have a diagnosis/diagnoses of Severe protein-calorie malnutrition and Underweight (BMI < 19).    This patient is being managed with:   Diet DASH/TLC-  Sodium & Cholesterol Restricted  Soft and Bite Sized (SOFTBTSZ)  Entered: Mar 28 2022 12:24PM    
This patient has been assessed with a concern for Malnutrition and has been determined to have a diagnosis/diagnoses of Severe protein-calorie malnutrition and Underweight (BMI < 19).    This patient is being managed with:   Diet DASH/TLC-  Sodium & Cholesterol Restricted  Soft and Bite Sized (SOFTBTSZ)  Prosource Gelatein Plus     Qty per Day:  2  Entered: Apr 4 2022 11:37AM    
This patient has been assessed with a concern for Malnutrition and has been determined to have a diagnosis/diagnoses of Severe protein-calorie malnutrition and Underweight (BMI < 19).    This patient is being managed with:   Diet DASH/TLC-  Sodium & Cholesterol Restricted  Soft and Bite Sized (SOFTBTSZ)  Entered: Mar 28 2022 12:24PM    
This patient has been assessed with a concern for Malnutrition and has been determined to have a diagnosis/diagnoses of Severe protein-calorie malnutrition and Underweight (BMI < 19).    This patient is being managed with:   Diet DASH/TLC-  Sodium & Cholesterol Restricted  Soft and Bite Sized (SOFTBTSZ)  Prosource Gelatein Plus     Qty per Day:  2  Entered: Apr 4 2022 11:37AM    
This patient has been assessed with a concern for Malnutrition and has been determined to have a diagnosis/diagnoses of Severe protein-calorie malnutrition and Underweight (BMI < 19).    This patient is being managed with:   Diet DASH/TLC-  Sodium & Cholesterol Restricted  Soft and Bite Sized (SOFTBTSZ)  Entered: Mar 28 2022 12:24PM    
This patient has been assessed with a concern for Malnutrition and has been determined to have a diagnosis/diagnoses of Severe protein-calorie malnutrition and Underweight (BMI < 19).    This patient is being managed with:   Diet DASH/TLC-  Sodium & Cholesterol Restricted  Soft and Bite Sized (SOFTBTSZ)  Entered: Mar 28 2022 12:24PM    
This patient has been assessed with a concern for Malnutrition and has been determined to have a diagnosis/diagnoses of Severe protein-calorie malnutrition and Underweight (BMI < 19).    This patient is being managed with:   Diet DASH/TLC-  Sodium & Cholesterol Restricted  Soft and Bite Sized (SOFTBTSZ)  Prosource Gelatein Plus     Qty per Day:  2  Entered: Apr 4 2022 11:37AM

## 2022-04-06 NOTE — PROGRESS NOTE ADULT - PROVIDER SPECIALTY LIST ADULT
Infectious Disease
Internal Medicine
Nephrology
Internal Medicine
Podiatry
Infectious Disease
Internal Medicine

## 2022-04-06 NOTE — PROGRESS NOTE ADULT - REASON FOR ADMISSION
hip ulcers and L heel blister

## 2022-04-07 ENCOUNTER — TRANSCRIPTION ENCOUNTER (OUTPATIENT)
Age: 82
End: 2022-04-07

## 2022-04-07 VITALS — DIASTOLIC BLOOD PRESSURE: 85 MMHG | HEART RATE: 92 BPM | SYSTOLIC BLOOD PRESSURE: 150 MMHG

## 2022-04-07 RX ORDER — MUPIROCIN 20 MG/G
1 OINTMENT TOPICAL
Qty: 30 | Refills: 0
Start: 2022-04-07 | End: 2022-05-06

## 2022-04-07 RX ORDER — ASCORBIC ACID 60 MG
1 TABLET,CHEWABLE ORAL
Qty: 30 | Refills: 0
Start: 2022-04-07 | End: 2022-05-06

## 2022-04-07 RX ORDER — FLUPHENAZINE HYDROCHLORIDE 1 MG/1
0.5 TABLET, FILM COATED ORAL
Qty: 15 | Refills: 0
Start: 2022-04-07 | End: 2022-05-06

## 2022-04-07 RX ORDER — ACETAMINOPHEN 500 MG
2 TABLET ORAL
Qty: 0 | Refills: 0 | DISCHARGE
Start: 2022-04-07

## 2022-04-07 RX ORDER — MUPIROCIN 20 MG/G
1 OINTMENT TOPICAL
Qty: 0 | Refills: 0 | DISCHARGE
Start: 2022-04-07

## 2022-04-07 RX ORDER — DONEPEZIL HYDROCHLORIDE 10 MG/1
1 TABLET, FILM COATED ORAL
Qty: 30 | Refills: 0
Start: 2022-04-07 | End: 2022-05-06

## 2022-04-07 RX ORDER — ASCORBIC ACID 60 MG
1 TABLET,CHEWABLE ORAL
Qty: 0 | Refills: 0 | DISCHARGE
Start: 2022-04-07

## 2022-04-07 RX ORDER — SIMVASTATIN 20 MG/1
1 TABLET, FILM COATED ORAL
Qty: 30 | Refills: 0
Start: 2022-04-07 | End: 2022-05-06

## 2022-04-07 RX ADMIN — ENOXAPARIN SODIUM 30 MILLIGRAM(S): 100 INJECTION SUBCUTANEOUS at 05:22

## 2022-04-07 RX ADMIN — MUPIROCIN 1 APPLICATION(S): 20 OINTMENT TOPICAL at 05:22

## 2022-04-07 NOTE — DISCHARGE NOTE NURSING/CASE MANAGEMENT/SOCIAL WORK - HISTORY OF COVID-19 VACCINATION
Yes Patient with >7.5% weight loss x 3 months and having severe muscle loss in clavicle/shoulder region

## 2022-04-07 NOTE — DISCHARGE NOTE NURSING/CASE MANAGEMENT/SOCIAL WORK - NSSCNAMETXT_GEN_ALL_CORE
[facility] [phone].  Nurse to visit on the day after discharge.  Other appropriate services to be arranged thereafter.   CDPap aide through [facility] [phone]. Agency: [facility].

## 2022-04-07 NOTE — DISCHARGE NOTE NURSING/CASE MANAGEMENT/SOCIAL WORK - PATIENT PORTAL LINK FT
You can access the FollowMyHealth Patient Portal offered by [facility] by registering at the following website: [email or web address]. By joining [employer]’s FollowMyHealth portal, you will also be able to view your health information using other applications (apps) compatible with our system.

## 2022-04-07 NOTE — DISCHARGE NOTE NURSING/CASE MANAGEMENT/SOCIAL WORK - NSDCFUADDAPPT_GEN_ALL_CORE_FT
Podiatry Discharge Instructions:  Follow up: Please follow up with Dr. Artis within 1 week of discharge from the hospital, please call [phone] for appointment and discuss that you recently were seen in the hospital.  Wound Care: Please apply mupirocin to left heel blister daily, dress w/ allevyn pad.   Weight bearing: Please wear z-flow offloading boots on both feet at all times while in bed.

## 2022-04-07 NOTE — DISCHARGE NOTE NURSING/CASE MANAGEMENT/SOCIAL WORK - NSDCPEFALRISK_GEN_ALL_CORE
For information on Fall & Injury Prevention, visit: [email or web address] OR  [email or web address] OR  https://www.cdc.gov/steadi/patient.html

## 2022-05-04 ENCOUNTER — INPATIENT (INPATIENT)
Facility: HOSPITAL | Age: 82
LOS: 0 days | Discharge: HOME | End: 2022-05-04
Attending: INTERNAL MEDICINE | Admitting: INTERNAL MEDICINE
Payer: MEDICARE

## 2022-05-04 VITALS
DIASTOLIC BLOOD PRESSURE: 52 MMHG | SYSTOLIC BLOOD PRESSURE: 142 MMHG | TEMPERATURE: 97 F | OXYGEN SATURATION: 98 % | HEIGHT: 62 IN | HEART RATE: 82 BPM | RESPIRATION RATE: 16 BRPM

## 2022-05-04 VITALS
TEMPERATURE: 99 F | HEART RATE: 80 BPM | OXYGEN SATURATION: 100 % | RESPIRATION RATE: 16 BRPM | SYSTOLIC BLOOD PRESSURE: 148 MMHG | DIASTOLIC BLOOD PRESSURE: 88 MMHG

## 2022-05-04 DIAGNOSIS — Z98.890 OTHER SPECIFIED POSTPROCEDURAL STATES: Chronic | ICD-10-CM

## 2022-05-04 DIAGNOSIS — N39.0 URINARY TRACT INFECTION, SITE NOT SPECIFIED: ICD-10-CM

## 2022-05-04 LAB
ALBUMIN SERPL ELPH-MCNC: 3.8 G/DL — SIGNIFICANT CHANGE UP (ref 3.3–5)
ALP SERPL-CCNC: 106 U/L — SIGNIFICANT CHANGE UP (ref 40–120)
ALT FLD-CCNC: 21 U/L — SIGNIFICANT CHANGE UP (ref 4–33)
ANION GAP SERPL CALC-SCNC: 11 MMOL/L — SIGNIFICANT CHANGE UP (ref 7–14)
ANION GAP SERPL CALC-SCNC: 8 MMOL/L — SIGNIFICANT CHANGE UP (ref 7–14)
APPEARANCE UR: ABNORMAL
AST SERPL-CCNC: 52 U/L — HIGH (ref 4–32)
BASOPHILS # BLD AUTO: 0.04 K/UL — SIGNIFICANT CHANGE UP (ref 0–0.2)
BASOPHILS NFR BLD AUTO: 0.4 % — SIGNIFICANT CHANGE UP (ref 0–2)
BILIRUB SERPL-MCNC: 0.3 MG/DL — SIGNIFICANT CHANGE UP (ref 0.2–1.2)
BILIRUB UR-MCNC: NEGATIVE — SIGNIFICANT CHANGE UP
BUN SERPL-MCNC: 15 MG/DL — SIGNIFICANT CHANGE UP (ref 7–23)
BUN SERPL-MCNC: 15 MG/DL — SIGNIFICANT CHANGE UP (ref 7–23)
CALCIUM SERPL-MCNC: 8.7 MG/DL — SIGNIFICANT CHANGE UP (ref 8.4–10.5)
CALCIUM SERPL-MCNC: 9.4 MG/DL — SIGNIFICANT CHANGE UP (ref 8.4–10.5)
CHLORIDE SERPL-SCNC: 107 MMOL/L — SIGNIFICANT CHANGE UP (ref 98–107)
CHLORIDE SERPL-SCNC: 112 MMOL/L — HIGH (ref 98–107)
CO2 SERPL-SCNC: 25 MMOL/L — SIGNIFICANT CHANGE UP (ref 22–31)
CO2 SERPL-SCNC: 25 MMOL/L — SIGNIFICANT CHANGE UP (ref 22–31)
COLOR SPEC: YELLOW — SIGNIFICANT CHANGE UP
CREAT SERPL-MCNC: 0.63 MG/DL — SIGNIFICANT CHANGE UP (ref 0.5–1.3)
CREAT SERPL-MCNC: 0.69 MG/DL — SIGNIFICANT CHANGE UP (ref 0.5–1.3)
DIFF PNL FLD: ABNORMAL
EGFR: 87 ML/MIN/1.73M2 — SIGNIFICANT CHANGE UP
EGFR: 89 ML/MIN/1.73M2 — SIGNIFICANT CHANGE UP
EOSINOPHIL # BLD AUTO: 0.21 K/UL — SIGNIFICANT CHANGE UP (ref 0–0.5)
EOSINOPHIL NFR BLD AUTO: 2.1 % — SIGNIFICANT CHANGE UP (ref 0–6)
FLUAV AG NPH QL: SIGNIFICANT CHANGE UP
FLUBV AG NPH QL: SIGNIFICANT CHANGE UP
GAS PNL BLDV: SIGNIFICANT CHANGE UP
GLUCOSE SERPL-MCNC: 79 MG/DL — SIGNIFICANT CHANGE UP (ref 70–99)
GLUCOSE SERPL-MCNC: 89 MG/DL — SIGNIFICANT CHANGE UP (ref 70–99)
GLUCOSE UR QL: NEGATIVE — SIGNIFICANT CHANGE UP
HCT VFR BLD CALC: 37.8 % — SIGNIFICANT CHANGE UP (ref 34.5–45)
HGB BLD-MCNC: 12.4 G/DL — SIGNIFICANT CHANGE UP (ref 11.5–15.5)
IANC: 6.21 K/UL — SIGNIFICANT CHANGE UP (ref 1.8–7.4)
IMM GRANULOCYTES NFR BLD AUTO: 0.2 % — SIGNIFICANT CHANGE UP (ref 0–1.5)
KETONES UR-MCNC: NEGATIVE — SIGNIFICANT CHANGE UP
LEUKOCYTE ESTERASE UR-ACNC: ABNORMAL
LYMPHOCYTES # BLD AUTO: 2.86 K/UL — SIGNIFICANT CHANGE UP (ref 1–3.3)
LYMPHOCYTES # BLD AUTO: 28.3 % — SIGNIFICANT CHANGE UP (ref 13–44)
MAGNESIUM SERPL-MCNC: 2.4 MG/DL — SIGNIFICANT CHANGE UP (ref 1.6–2.6)
MCHC RBC-ENTMCNC: 29.3 PG — SIGNIFICANT CHANGE UP (ref 27–34)
MCHC RBC-ENTMCNC: 32.8 GM/DL — SIGNIFICANT CHANGE UP (ref 32–36)
MCV RBC AUTO: 89.4 FL — SIGNIFICANT CHANGE UP (ref 80–100)
MONOCYTES # BLD AUTO: 0.78 K/UL — SIGNIFICANT CHANGE UP (ref 0–0.9)
MONOCYTES NFR BLD AUTO: 7.7 % — SIGNIFICANT CHANGE UP (ref 2–14)
NEUTROPHILS # BLD AUTO: 6.21 K/UL — SIGNIFICANT CHANGE UP (ref 1.8–7.4)
NEUTROPHILS NFR BLD AUTO: 61.3 % — SIGNIFICANT CHANGE UP (ref 43–77)
NITRITE UR-MCNC: NEGATIVE — SIGNIFICANT CHANGE UP
NRBC # BLD: 0 /100 WBCS — SIGNIFICANT CHANGE UP
NRBC # FLD: 0 K/UL — SIGNIFICANT CHANGE UP
PH UR: 6 — SIGNIFICANT CHANGE UP (ref 5–8)
PLATELET # BLD AUTO: 195 K/UL — SIGNIFICANT CHANGE UP (ref 150–400)
POTASSIUM SERPL-MCNC: 4.1 MMOL/L — SIGNIFICANT CHANGE UP (ref 3.5–5.3)
POTASSIUM SERPL-MCNC: 5.8 MMOL/L — HIGH (ref 3.5–5.3)
POTASSIUM SERPL-SCNC: 4.1 MMOL/L — SIGNIFICANT CHANGE UP (ref 3.5–5.3)
POTASSIUM SERPL-SCNC: 5.8 MMOL/L — HIGH (ref 3.5–5.3)
PROT SERPL-MCNC: 7.1 G/DL — SIGNIFICANT CHANGE UP (ref 6–8.3)
PROT UR-MCNC: ABNORMAL
RBC # BLD: 4.23 M/UL — SIGNIFICANT CHANGE UP (ref 3.8–5.2)
RBC # FLD: 17.2 % — HIGH (ref 10.3–14.5)
RSV RNA NPH QL NAA+NON-PROBE: SIGNIFICANT CHANGE UP
SARS-COV-2 RNA SPEC QL NAA+PROBE: SIGNIFICANT CHANGE UP
SODIUM SERPL-SCNC: 143 MMOL/L — SIGNIFICANT CHANGE UP (ref 135–145)
SODIUM SERPL-SCNC: 145 MMOL/L — SIGNIFICANT CHANGE UP (ref 135–145)
SP GR SPEC: 1.02 — SIGNIFICANT CHANGE UP (ref 1–1.05)
TROPONIN T, HIGH SENSITIVITY RESULT: 23 NG/L — SIGNIFICANT CHANGE UP
TSH SERPL-MCNC: 1.99 UIU/ML — SIGNIFICANT CHANGE UP (ref 0.27–4.2)
UROBILINOGEN FLD QL: SIGNIFICANT CHANGE UP
WBC # BLD: 10.12 K/UL — SIGNIFICANT CHANGE UP (ref 3.8–10.5)
WBC # FLD AUTO: 10.12 K/UL — SIGNIFICANT CHANGE UP (ref 3.8–10.5)

## 2022-05-04 PROCEDURE — 99285 EMERGENCY DEPT VISIT HI MDM: CPT | Mod: GC

## 2022-05-04 PROCEDURE — 71045 X-RAY EXAM CHEST 1 VIEW: CPT | Mod: 26

## 2022-05-04 RX ORDER — CEPHALEXIN 500 MG
1 CAPSULE ORAL
Qty: 28 | Refills: 0
Start: 2022-05-04 | End: 2022-05-10

## 2022-05-04 RX ORDER — SODIUM CHLORIDE 9 MG/ML
1000 INJECTION INTRAMUSCULAR; INTRAVENOUS; SUBCUTANEOUS ONCE
Refills: 0 | Status: COMPLETED | OUTPATIENT
Start: 2022-05-04 | End: 2022-05-04

## 2022-05-04 RX ORDER — CEFTRIAXONE 500 MG/1
1000 INJECTION, POWDER, FOR SOLUTION INTRAMUSCULAR; INTRAVENOUS ONCE
Refills: 0 | Status: COMPLETED | OUTPATIENT
Start: 2022-05-04 | End: 2022-05-04

## 2022-05-04 RX ORDER — CEPHALEXIN 500 MG
1 CAPSULE ORAL
Qty: 24 | Refills: 0
Start: 2022-05-04 | End: 2022-05-09

## 2022-05-04 RX ADMIN — CEFTRIAXONE 100 MILLIGRAM(S): 500 INJECTION, POWDER, FOR SOLUTION INTRAMUSCULAR; INTRAVENOUS at 16:56

## 2022-05-04 RX ADMIN — SODIUM CHLORIDE 1000 MILLILITER(S): 9 INJECTION INTRAMUSCULAR; INTRAVENOUS; SUBCUTANEOUS at 15:33

## 2022-05-04 NOTE — ED PROVIDER NOTE - OBJECTIVE STATEMENT
81F PMH CVA, dementia presents with lethargy. As per HHA, last 2-3 days pt has been more somnolent than usual. Normally awake and oriented to self. States has not noticed any focal sx, No cough, diarrhea, fever. Pt has previously been admitted for UTI, skin infections.

## 2022-05-04 NOTE — ED PROVIDER NOTE - PATIENT PORTAL LINK FT
You can access the FollowMyHealth Patient Portal offered by [facility] by registering at the following website: [email or web address]. By joining [employer]’s FollowMyHealth portal, you will also be able to view your health information using other applications (apps) compatible with our system.

## 2022-05-04 NOTE — ED PROVIDER NOTE - PROGRESS NOTE DETAILS
Pt inially admitted to medicine. HHA got in touch with daughter who states they prefer to take pt home. I discussed risks and benefits of admission. Daughter was confident pt will be able to take medications at home. Pt is also looking more alert at this time with fluids. Return precautions given. Will cancel admission. ABX sent to pharmacy. Will also cancel pending CT as pt appears better and has identified cause of somnolence.   Puma Smith M.D. PGY-4

## 2022-05-04 NOTE — ED PROVIDER NOTE - NSFOLLOWUPINSTRUCTIONS_ED_ALL_ED_FT
Urinary Tract Infection    A urinary tract infection (UTI) is an infection of any part of the urinary tract, which includes the kidneys, ureters, bladder, and urethra. Risk factors include ignoring your need to urinate, wiping back to front if female, being an uncircumcised male, and having diabetes or a weak immune system. Symptoms include frequent urination, pain or burning with urination, foul smelling urine, cloudy urine, pain in the lower abdomen, blood in the urine, and fever. If you were prescribed an antibiotic medicine, take it as told by your health care provider. Do not stop taking the antibiotic even if you start to feel better.    SEEK IMMEDIATE MEDICAL CARE IF YOU HAVE ANY OF THE FOLLOWING SYMPTOMS: severe back or abdominal pain, fever, inability to keep fluids or medicine down, dizziness/lightheadedness, or a change in mental status. Please take the prescribed antibiotics as directed.   You may start them tomorrow morning, as the first does was given in the ER.     Urinary Tract Infection    A urinary tract infection (UTI) is an infection of any part of the urinary tract, which includes the kidneys, ureters, bladder, and urethra. Risk factors include ignoring your need to urinate, wiping back to front if female, being an uncircumcised male, and having diabetes or a weak immune system. Symptoms include frequent urination, pain or burning with urination, foul smelling urine, cloudy urine, pain in the lower abdomen, blood in the urine, and fever. If you were prescribed an antibiotic medicine, take it as told by your health care provider. Do not stop taking the antibiotic even if you start to feel better.    SEEK IMMEDIATE MEDICAL CARE IF YOU HAVE ANY OF THE FOLLOWING SYMPTOMS: severe back or abdominal pain, fever, inability to keep fluids or medicine down, dizziness/lightheadedness, or a change in mental status.

## 2022-05-04 NOTE — ED PROVIDER NOTE - ATTENDING CONTRIBUTION TO CARE
81 year old with dementia. per ha patient more lethargic. concenr for uti vs electrolyte abn vs renal failure vs liver failure vs occult infeciton vs pna. labs ua urine culture cxr cbc cmo fluids mariia admit

## 2022-05-04 NOTE — ED ADULT NURSE NOTE - OBJECTIVE STATEMENT
pt A&ox1, awake and alert, pt minimally verbal, pmh of dementia, came to ED for lethargy and fatigue at home. according to home aid at bedside pt had been more tired then usual. non verbal indicators of Chest pain and SOB absent. NSR on telemetry. breathing is spontaneous and unlabored. sating 99% on RA. bilateral pedal and radial pulses palpable and strong. right 20g IV placed Labs drawn and sent as per ordered. Bed in lowest position, call bell within reach, all other safety and comfort measures provided.  stage 4 pressure injury to left hip, stage 2 pressure injury to right hip, stage 2 pressure injury to sacrum. awaiting labs results and further orders.

## 2022-05-04 NOTE — ED PROVIDER NOTE - CLINICAL SUMMARY MEDICAL DECISION MAKING FREE TEXT BOX
81F presents with lethary. Likely has occult infection. Will check for source as well as metabolic derangements. likely admit. 81F presents with lethargy. Likely has occult infection. Will check for source as well as metabolic derangements. likely admit.

## 2022-05-04 NOTE — ED PROVIDER NOTE - PHYSICAL EXAMINATION
General: Elderly female, Well developed, well nourished in NAD  HEENT: Normocephalic and atraumatic, Trachea midline.   Cardiac: Normal S1 and S2  Pulmonary: No increased WOB.   Abdominal: Soft, NTND  Neurologic: Somnolent, Oriented to self.   Musculoskeletal: No limited ROM or deformities  Vascular: Warm and well perfused  Skin: Color appropriate   Psychiatric: No apparent risk to self or others.  Puma Smith M.D.

## 2022-05-04 NOTE — ED ADULT TRIAGE NOTE - CHIEF COMPLAINT QUOTE
Brought in by HHA for increased lethargy for 2x days, LKW Monday by family, pt a&ox0 at baseline, unable to follow commands, hx of HTN, CVA (2006), dementia

## 2022-05-05 LAB
CULTURE RESULTS: SIGNIFICANT CHANGE UP
SPECIMEN SOURCE: SIGNIFICANT CHANGE UP

## 2022-05-09 LAB
CULTURE RESULTS: SIGNIFICANT CHANGE UP
CULTURE RESULTS: SIGNIFICANT CHANGE UP
SPECIMEN SOURCE: SIGNIFICANT CHANGE UP
SPECIMEN SOURCE: SIGNIFICANT CHANGE UP

## 2022-07-03 ENCOUNTER — EMERGENCY (EMERGENCY)
Facility: HOSPITAL | Age: 82
LOS: 1 days | Discharge: HOME | End: 2022-07-03
Attending: EMERGENCY MEDICINE | Admitting: EMERGENCY MEDICINE

## 2022-07-03 VITALS
OXYGEN SATURATION: 100 % | HEART RATE: 72 BPM | SYSTOLIC BLOOD PRESSURE: 138 MMHG | TEMPERATURE: 98 F | RESPIRATION RATE: 18 BRPM | DIASTOLIC BLOOD PRESSURE: 78 MMHG

## 2022-07-03 VITALS
HEART RATE: 72 BPM | OXYGEN SATURATION: 100 % | DIASTOLIC BLOOD PRESSURE: 79 MMHG | HEIGHT: 62 IN | RESPIRATION RATE: 16 BRPM | TEMPERATURE: 97 F | SYSTOLIC BLOOD PRESSURE: 94 MMHG

## 2022-07-03 DIAGNOSIS — Z98.890 OTHER SPECIFIED POSTPROCEDURAL STATES: Chronic | ICD-10-CM

## 2022-07-03 LAB
ALBUMIN SERPL ELPH-MCNC: 3.7 G/DL — SIGNIFICANT CHANGE UP (ref 3.3–5)
ALP SERPL-CCNC: 115 U/L — SIGNIFICANT CHANGE UP (ref 40–120)
ALT FLD-CCNC: 23 U/L — SIGNIFICANT CHANGE UP (ref 4–33)
ANION GAP SERPL CALC-SCNC: 10 MMOL/L — SIGNIFICANT CHANGE UP (ref 7–14)
APPEARANCE UR: CLEAR — SIGNIFICANT CHANGE UP
AST SERPL-CCNC: 31 U/L — SIGNIFICANT CHANGE UP (ref 4–32)
BACTERIA # UR AUTO: ABNORMAL
BASE EXCESS BLDV CALC-SCNC: 6.4 MMOL/L — HIGH (ref -2–3)
BASOPHILS # BLD AUTO: 0.04 K/UL — SIGNIFICANT CHANGE UP (ref 0–0.2)
BASOPHILS NFR BLD AUTO: 0.4 % — SIGNIFICANT CHANGE UP (ref 0–2)
BILIRUB SERPL-MCNC: 0.3 MG/DL — SIGNIFICANT CHANGE UP (ref 0.2–1.2)
BILIRUB UR-MCNC: NEGATIVE — SIGNIFICANT CHANGE UP
BLOOD GAS VENOUS COMPREHENSIVE RESULT: SIGNIFICANT CHANGE UP
BUN SERPL-MCNC: 18 MG/DL — SIGNIFICANT CHANGE UP (ref 7–23)
CALCIUM SERPL-MCNC: 9.4 MG/DL — SIGNIFICANT CHANGE UP (ref 8.4–10.5)
CHLORIDE BLDV-SCNC: 112 MMOL/L — HIGH (ref 96–108)
CHLORIDE SERPL-SCNC: 111 MMOL/L — HIGH (ref 98–107)
CO2 BLDV-SCNC: 35.3 MMOL/L — HIGH (ref 22–26)
CO2 SERPL-SCNC: 26 MMOL/L — SIGNIFICANT CHANGE UP (ref 22–31)
COLOR SPEC: SIGNIFICANT CHANGE UP
CREAT SERPL-MCNC: 0.88 MG/DL — SIGNIFICANT CHANGE UP (ref 0.5–1.3)
DIFF PNL FLD: NEGATIVE — SIGNIFICANT CHANGE UP
EGFR: 66 ML/MIN/1.73M2 — SIGNIFICANT CHANGE UP
EOSINOPHIL # BLD AUTO: 0.15 K/UL — SIGNIFICANT CHANGE UP (ref 0–0.5)
EOSINOPHIL NFR BLD AUTO: 1.6 % — SIGNIFICANT CHANGE UP (ref 0–6)
EPI CELLS # UR: 1 /HPF — SIGNIFICANT CHANGE UP (ref 0–5)
GAS PNL BLDV: 144 MMOL/L — SIGNIFICANT CHANGE UP (ref 136–145)
GIANT PLATELETS BLD QL SMEAR: PRESENT — SIGNIFICANT CHANGE UP
GLUCOSE BLDV-MCNC: 77 MG/DL — SIGNIFICANT CHANGE UP (ref 70–99)
GLUCOSE SERPL-MCNC: 74 MG/DL — SIGNIFICANT CHANGE UP (ref 70–99)
GLUCOSE UR QL: NEGATIVE — SIGNIFICANT CHANGE UP
HCO3 BLDV-SCNC: 34 MMOL/L — HIGH (ref 22–29)
HCT VFR BLD CALC: 41 % — SIGNIFICANT CHANGE UP (ref 34.5–45)
HCT VFR BLDA CALC: 41 % — SIGNIFICANT CHANGE UP (ref 34.5–46.5)
HGB BLD CALC-MCNC: 13.7 G/DL — SIGNIFICANT CHANGE UP (ref 11.5–15.5)
HGB BLD-MCNC: 13.3 G/DL — SIGNIFICANT CHANGE UP (ref 11.5–15.5)
HYALINE CASTS # UR AUTO: 1 /LPF — SIGNIFICANT CHANGE UP (ref 0–7)
IANC: 5.37 K/UL — SIGNIFICANT CHANGE UP (ref 1.8–7.4)
IMM GRANULOCYTES NFR BLD AUTO: 0.2 % — SIGNIFICANT CHANGE UP (ref 0–1.5)
KETONES UR-MCNC: NEGATIVE — SIGNIFICANT CHANGE UP
LACTATE BLDV-MCNC: 2.1 MMOL/L — HIGH (ref 0.5–2)
LEUKOCYTE ESTERASE UR-ACNC: ABNORMAL
LYMPHOCYTES # BLD AUTO: 3.25 K/UL — SIGNIFICANT CHANGE UP (ref 1–3.3)
LYMPHOCYTES # BLD AUTO: 34.9 % — SIGNIFICANT CHANGE UP (ref 13–44)
MAGNESIUM SERPL-MCNC: 2.4 MG/DL — SIGNIFICANT CHANGE UP (ref 1.6–2.6)
MCHC RBC-ENTMCNC: 30.1 PG — SIGNIFICANT CHANGE UP (ref 27–34)
MCHC RBC-ENTMCNC: 32.4 GM/DL — SIGNIFICANT CHANGE UP (ref 32–36)
MCV RBC AUTO: 92.8 FL — SIGNIFICANT CHANGE UP (ref 80–100)
MONOCYTES # BLD AUTO: 0.47 K/UL — SIGNIFICANT CHANGE UP (ref 0–0.9)
MONOCYTES NFR BLD AUTO: 5.1 % — SIGNIFICANT CHANGE UP (ref 2–14)
NEUTROPHILS # BLD AUTO: 5.37 K/UL — SIGNIFICANT CHANGE UP (ref 1.8–7.4)
NEUTROPHILS NFR BLD AUTO: 57.8 % — SIGNIFICANT CHANGE UP (ref 43–77)
NITRITE UR-MCNC: NEGATIVE — SIGNIFICANT CHANGE UP
NRBC # BLD: 0 /100 WBCS — SIGNIFICANT CHANGE UP
NRBC # FLD: 0 K/UL — SIGNIFICANT CHANGE UP
PCO2 BLDV: 58 MMHG — HIGH (ref 39–42)
PH BLDV: 7.37 — SIGNIFICANT CHANGE UP (ref 7.32–7.43)
PH UR: 6 — SIGNIFICANT CHANGE UP (ref 5–8)
PLAT MORPH BLD: NORMAL — SIGNIFICANT CHANGE UP
PLATELET # BLD AUTO: 129 K/UL — LOW (ref 150–400)
PLATELET COUNT - ESTIMATE: ABNORMAL
PO2 BLDV: 26 MMHG — SIGNIFICANT CHANGE UP
POTASSIUM BLDV-SCNC: SIGNIFICANT CHANGE UP MMOL/L (ref 3.5–5.1)
POTASSIUM SERPL-MCNC: 4.3 MMOL/L — SIGNIFICANT CHANGE UP (ref 3.5–5.3)
POTASSIUM SERPL-SCNC: 4.3 MMOL/L — SIGNIFICANT CHANGE UP (ref 3.5–5.3)
PROT SERPL-MCNC: 6.8 G/DL — SIGNIFICANT CHANGE UP (ref 6–8.3)
PROT UR-MCNC: NEGATIVE — SIGNIFICANT CHANGE UP
RBC # BLD: 4.42 M/UL — SIGNIFICANT CHANGE UP (ref 3.8–5.2)
RBC # FLD: 16.2 % — HIGH (ref 10.3–14.5)
RBC BLD AUTO: NORMAL — SIGNIFICANT CHANGE UP
RBC CASTS # UR COMP ASSIST: 2 /HPF — SIGNIFICANT CHANGE UP (ref 0–4)
SAO2 % BLDV: 28.8 % — SIGNIFICANT CHANGE UP
SODIUM SERPL-SCNC: 147 MMOL/L — HIGH (ref 135–145)
SP GR SPEC: 1.02 — SIGNIFICANT CHANGE UP (ref 1–1.05)
UROBILINOGEN FLD QL: SIGNIFICANT CHANGE UP
WBC # BLD: 9.3 K/UL — SIGNIFICANT CHANGE UP (ref 3.8–10.5)
WBC # FLD AUTO: 9.3 K/UL — SIGNIFICANT CHANGE UP (ref 3.8–10.5)
WBC UR QL: SIGNIFICANT CHANGE UP /HPF (ref 0–5)

## 2022-07-03 PROCEDURE — 99284 EMERGENCY DEPT VISIT MOD MDM: CPT

## 2022-07-03 RX ORDER — SODIUM CHLORIDE 9 MG/ML
1000 INJECTION INTRAMUSCULAR; INTRAVENOUS; SUBCUTANEOUS ONCE
Refills: 0 | Status: COMPLETED | OUTPATIENT
Start: 2022-07-03 | End: 2022-07-03

## 2022-07-03 RX ORDER — CEFTRIAXONE 500 MG/1
1000 INJECTION, POWDER, FOR SOLUTION INTRAMUSCULAR; INTRAVENOUS ONCE
Refills: 0 | Status: COMPLETED | OUTPATIENT
Start: 2022-07-03 | End: 2022-07-03

## 2022-07-03 RX ORDER — CEPHALEXIN 500 MG
1 CAPSULE ORAL
Qty: 10 | Refills: 0
Start: 2022-07-03 | End: 2022-07-07

## 2022-07-03 RX ADMIN — CEFTRIAXONE 100 MILLIGRAM(S): 500 INJECTION, POWDER, FOR SOLUTION INTRAMUSCULAR; INTRAVENOUS at 16:24

## 2022-07-03 RX ADMIN — SODIUM CHLORIDE 2000 MILLILITER(S): 9 INJECTION INTRAMUSCULAR; INTRAVENOUS; SUBCUTANEOUS at 16:24

## 2022-07-03 NOTE — ED PROVIDER NOTE - PATIENT PORTAL LINK FT
You can access the FollowMyHealth Patient Portal offered by [facility] by registering at the following website: [email or web address]. By joining [employer]’s FollowMyHealth portal, you will also be able to view your health information using other applications (apps) compatible with our system.

## 2022-07-03 NOTE — ED PROVIDER NOTE - PROGRESS NOTE DETAILS
Pt reassessed at bedside, feels well, pain controlled. Informed of workup in ED, reviewed lab and/or radiology results (when applicable) with patient/caregiver. pt discussed strict return precautions and low plt count. no sign of acute bleeding.  pt to follow up with PMD for repeat CBC.  Informed pt of plan for discharge with instructions to follow up with PMD. Pt/caregiver expressed understanding of plan and agrees with plan for discharge. Strict return precautions discussed with patient in layman's terms, patient demonstrated understanding of return precautions. Eliseo Chavez PA-C

## 2022-07-03 NOTE — ED ADULT NURSE NOTE - NSIMPLEMENTINTERV_GEN_ALL_ED
Implemented All Fall Risk Interventions:  [place] to call system. Call bell, personal items and telephone within reach. Instruct patient to call for assistance. Room bathroom lighting operational. Non-slip footwear when patient is off stretcher. Physically safe environment: no spills, clutter or unnecessary equipment. Stretcher in lowest position, wheels locked, appropriate side rails in place. Provide visual cue, wrist band, yellow gown, etc. Monitor gait and stability. Monitor for mental status changes and reorient to person, place, and time. Review medications for side effects contributing to fall risk. Reinforce activity limits and safety measures with patient and family.

## 2022-07-03 NOTE — ED PROVIDER NOTE - NS ED ATTENDING STATEMENT MOD
This was a shared visit with the JAMAAL. I reviewed and verified the documentation and independently performed the documented:

## 2022-07-03 NOTE — ED PROVIDER NOTE - ATTENDING APP SHARED VISIT CONTRIBUTION OF CARE
81F with hx of UTI, urosepsis, delirium and AMS with recent hx of admission for urosepsis and confusion now to ED due to pulling at diaper. Per pt. daughter, pt. has baseline mental status and has normal po intake since last discharge, no fever/chills. Pt. wears diaper and at last admission had been seen to pull at diaper for several d. before became altered and lethargic and required admission. Now feels this may be an early sign of UTI. MMM, clear lungs, heart reg, abd soft, NT to palp, no CVAT, no edema, No rashes.

## 2022-07-03 NOTE — ED PROVIDER NOTE - NSFOLLOWUPINSTRUCTIONS_ED_ALL_ED_FT
Advance activity as tolerated.  Continue all previously prescribed medications as directed unless otherwise instructed.  Follow up with your primary care physician in 48-72 hours- bring copies of your results.  Return to the ER for worsening or persistent symptoms, and/or ANY NEW OR CONCERNING SYMPTOMS. If you have issues obtaining follow up, please call: [phone] to obtain a doctor or specialist who takes your insurance in your area.  You may call [phone] to make an appointment with the internal medicine clinic.

## 2022-07-03 NOTE — ED PROVIDER NOTE - OBJECTIVE STATEMENT
80 yo F with a history of HTN, CVA w/o focal residual deficits, Alzheimer's dementia (AOx1 at baseline) who is accompanied by daughter at bedside for evaluation of UTI. as per daughter since last night she has been pooling on her diaper. she states she has a history of UTI where she gets altered if it gets bad. she states she is currently on her baseline mental status.

## 2022-07-03 NOTE — ED ADULT NURSE NOTE - OBJECTIVE STATEMENT
81 year old female received to rm 28 AAOx0 nonverbal at baseline d/t CVA 10yrs ago. Pt ambulatory with walker per pt's daughter. Pt's daughter states "I think she has a UTI. She has been pulling at her pamper last night and grimacing. She also is very weak and tired". Pt placed on primafit to obtain urine. Respirations even and unlabored. PIV #20 right forearm, labs drawn and sent. VS as noted. Bed in lowest position, call bell within reach

## 2022-07-05 LAB
-  AMIKACIN: SIGNIFICANT CHANGE UP
-  AMOXICILLIN/CLAVULANIC ACID: SIGNIFICANT CHANGE UP
-  AMPICILLIN/SULBACTAM: SIGNIFICANT CHANGE UP
-  AMPICILLIN: SIGNIFICANT CHANGE UP
-  AMPICILLIN: SIGNIFICANT CHANGE UP
-  AZTREONAM: SIGNIFICANT CHANGE UP
-  CEFAZOLIN: SIGNIFICANT CHANGE UP
-  CEFEPIME: SIGNIFICANT CHANGE UP
-  CEFOXITIN: SIGNIFICANT CHANGE UP
-  CEFTRIAXONE: SIGNIFICANT CHANGE UP
-  CIPROFLOXACIN: SIGNIFICANT CHANGE UP
-  CIPROFLOXACIN: SIGNIFICANT CHANGE UP
-  ERTAPENEM: SIGNIFICANT CHANGE UP
-  GENTAMICIN: SIGNIFICANT CHANGE UP
-  IMIPENEM: SIGNIFICANT CHANGE UP
-  LEVOFLOXACIN: SIGNIFICANT CHANGE UP
-  LEVOFLOXACIN: SIGNIFICANT CHANGE UP
-  MEROPENEM: SIGNIFICANT CHANGE UP
-  NITROFURANTOIN: SIGNIFICANT CHANGE UP
-  NITROFURANTOIN: SIGNIFICANT CHANGE UP
-  PIPERACILLIN/TAZOBACTAM: SIGNIFICANT CHANGE UP
-  TETRACYCLINE: SIGNIFICANT CHANGE UP
-  TIGECYCLINE: SIGNIFICANT CHANGE UP
-  TOBRAMYCIN: SIGNIFICANT CHANGE UP
-  TRIMETHOPRIM/SULFAMETHOXAZOLE: SIGNIFICANT CHANGE UP
-  VANCOMYCIN: SIGNIFICANT CHANGE UP
METHOD TYPE: SIGNIFICANT CHANGE UP
METHOD TYPE: SIGNIFICANT CHANGE UP

## 2022-07-06 NOTE — ED POST DISCHARGE NOTE - RESULT SUMMARY
UCX : E. Coli > 100,000 and : Enterococcus faecalis 50,000-99,000CFU/ml Patient discharged home with a prescription for Cephalexin which is sensitive to E. coli but not listed for Enterococus faecalis message left with Call Back  P.A. number and hours for return call back.

## 2022-07-07 LAB
CULTURE RESULTS: SIGNIFICANT CHANGE UP
ORGANISM # SPEC MICROSCOPIC CNT: SIGNIFICANT CHANGE UP
SPECIMEN SOURCE: SIGNIFICANT CHANGE UP

## 2022-07-11 RX ORDER — RAMIPRIL 5 MG
1 CAPSULE ORAL
Qty: 0 | Refills: 0 | DISCHARGE

## 2022-07-11 RX ORDER — FLUPHENAZINE HYDROCHLORIDE 1 MG/1
0.5 TABLET, FILM COATED ORAL
Qty: 0 | Refills: 0 | DISCHARGE

## 2022-07-11 RX ORDER — CARVEDILOL PHOSPHATE 80 MG/1
1 CAPSULE, EXTENDED RELEASE ORAL
Qty: 0 | Refills: 0 | DISCHARGE

## 2022-07-11 RX ORDER — AMLODIPINE BESYLATE 2.5 MG/1
1 TABLET ORAL
Qty: 0 | Refills: 0 | DISCHARGE

## 2022-07-11 RX ORDER — SIMVASTATIN 20 MG/1
1 TABLET, FILM COATED ORAL
Qty: 0 | Refills: 0 | DISCHARGE

## 2022-07-11 RX ORDER — DONEPEZIL HYDROCHLORIDE 10 MG/1
1 TABLET, FILM COATED ORAL
Qty: 0 | Refills: 0 | DISCHARGE

## 2022-07-12 NOTE — ED ADULT NURSE NOTE - NSIMPLEMENTINTERV_GEN_ALL_ED
Implemented All Fall with Harm Risk Interventions:  [place] to call system. Call bell, personal items and telephone within reach. Instruct patient to call for assistance. Room bathroom lighting operational. Non-slip footwear when patient is off stretcher. Physically safe environment: no spills, clutter or unnecessary equipment. Stretcher in lowest position, wheels locked, appropriate side rails in place. Provide visual cue, wrist band, yellow gown, etc. Monitor gait and stability. Monitor for mental status changes and reorient to person, place, and time. Review medications for side effects contributing to fall risk. Reinforce activity limits and safety measures with patient and family. Provide visual clues: red socks. negative...

## 2022-09-02 ENCOUNTER — EMERGENCY (EMERGENCY)
Facility: HOSPITAL | Age: 82
LOS: 1 days | Discharge: HOME | End: 2022-09-02
Attending: STUDENT IN AN ORGANIZED HEALTH CARE EDUCATION/TRAINING PROGRAM | Admitting: STUDENT IN AN ORGANIZED HEALTH CARE EDUCATION/TRAINING PROGRAM

## 2022-09-02 VITALS
HEIGHT: 62 IN | DIASTOLIC BLOOD PRESSURE: 70 MMHG | OXYGEN SATURATION: 98 % | TEMPERATURE: 98 F | HEART RATE: 78 BPM | SYSTOLIC BLOOD PRESSURE: 13 MMHG | RESPIRATION RATE: 16 BRPM

## 2022-09-02 DIAGNOSIS — Z98.890 OTHER SPECIFIED POSTPROCEDURAL STATES: Chronic | ICD-10-CM

## 2022-09-02 LAB
BASOPHILS # BLD AUTO: 0.05 K/UL — SIGNIFICANT CHANGE UP (ref 0–0.2)
BASOPHILS NFR BLD AUTO: 0.7 % — SIGNIFICANT CHANGE UP (ref 0–2)
EOSINOPHIL # BLD AUTO: 0.17 K/UL — SIGNIFICANT CHANGE UP (ref 0–0.5)
EOSINOPHIL NFR BLD AUTO: 2.5 % — SIGNIFICANT CHANGE UP (ref 0–6)
HCT VFR BLD CALC: 38.5 % — SIGNIFICANT CHANGE UP (ref 34.5–45)
HGB BLD-MCNC: 12.4 G/DL — SIGNIFICANT CHANGE UP (ref 11.5–15.5)
IANC: 2.93 K/UL — SIGNIFICANT CHANGE UP (ref 1.8–7.4)
IMM GRANULOCYTES NFR BLD AUTO: 0.3 % — SIGNIFICANT CHANGE UP (ref 0–1.5)
LYMPHOCYTES # BLD AUTO: 3.05 K/UL — SIGNIFICANT CHANGE UP (ref 1–3.3)
LYMPHOCYTES # BLD AUTO: 44.8 % — HIGH (ref 13–44)
MCHC RBC-ENTMCNC: 29.7 PG — SIGNIFICANT CHANGE UP (ref 27–34)
MCHC RBC-ENTMCNC: 32.2 GM/DL — SIGNIFICANT CHANGE UP (ref 32–36)
MCV RBC AUTO: 92.1 FL — SIGNIFICANT CHANGE UP (ref 80–100)
MONOCYTES # BLD AUTO: 0.59 K/UL — SIGNIFICANT CHANGE UP (ref 0–0.9)
MONOCYTES NFR BLD AUTO: 8.7 % — SIGNIFICANT CHANGE UP (ref 2–14)
NEUTROPHILS # BLD AUTO: 2.93 K/UL — SIGNIFICANT CHANGE UP (ref 1.8–7.4)
NEUTROPHILS NFR BLD AUTO: 43 % — SIGNIFICANT CHANGE UP (ref 43–77)
NRBC # BLD: 0 /100 WBCS — SIGNIFICANT CHANGE UP (ref 0–0)
NRBC # FLD: 0 K/UL — SIGNIFICANT CHANGE UP (ref 0–0)
PLATELET # BLD AUTO: 199 K/UL — SIGNIFICANT CHANGE UP (ref 150–400)
RBC # BLD: 4.18 M/UL — SIGNIFICANT CHANGE UP (ref 3.8–5.2)
RBC # FLD: 15.8 % — HIGH (ref 10.3–14.5)
WBC # BLD: 6.81 K/UL — SIGNIFICANT CHANGE UP (ref 3.8–10.5)
WBC # FLD AUTO: 6.81 K/UL — SIGNIFICANT CHANGE UP (ref 3.8–10.5)

## 2022-09-02 PROCEDURE — 93010 ELECTROCARDIOGRAM REPORT: CPT

## 2022-09-02 PROCEDURE — 99284 EMERGENCY DEPT VISIT MOD MDM: CPT | Mod: 25

## 2022-09-02 NOTE — ED PROVIDER NOTE - PHYSICAL EXAMINATION
Rebecca BRIGHT:  VITALS: Initial triage and subsequent vitals have been reviewed by me.  GEN APPEARANCE: WDWN, alert and cooperative, non-toxic appearing and in NAD  HEAD: Atraumatic, normocephalic   EYES: PERRLa, EOMI, vision grossly intact.   EARS: Gross hearing intact.   NOSE: No nasal discharge, no external evidence of epistaxis.   NECK: Supple  CV: RRR, S1S2, no c/r/m/g. No cyanosis. Extremities warm, well perfused. Cap refill <2 seconds. No bruits.   LUNGS: CTAB. No wheezing. No rales. No rhonchi. No diminished breath sounds.   ABDOMEN: Soft, NTND. No guarding or rebound. No masses.   MSK/EXT: Spine appears normal, no spine point tenderness. No CVA ttp. Normal muscular development. Pelvis stable. No obvious joint or bony deformity, no peripheral edema.   NEURO: Alert, follows commands.  SKIN: Normal color for race, warm, dry and intact. No evidence of rash.  PSYCH: dementia

## 2022-09-02 NOTE — ED PROVIDER NOTE - OBJECTIVE STATEMENT
Rebecca BRIGHT: 82F hx of dementia, presents with daughter w a cc of c/f UTI per daughter pt has been a little more lethargic over last few days, and has been holding lower abdomen, c/w prior episodes of UTI, seen in ED for similar in past reports in past got better with antibiotics, no fever, no vomiting. Noted triage note however daughter reports pt is acting like her normal self, lives with daughter. It is daughters preference pt not be hospitalized unless required. Pt with dementia, limited historian.

## 2022-09-02 NOTE — ED ADULT TRIAGE NOTE - PAIN: PRESENCE, MLM
From: Muriel Vera  To: Kp Nayak MD  Sent: 2/1/2020 4:55 PM CST  Subject: Medication Question    Dr. Nayak,    I went online to [employer]scripts and they do in fact cover the Treximet, but only under certain situations. The generic version is the one I am going to want to go with.  Sumatriptan-naproxen  Mg     Here is the phone number to call for the review: [phone]    Thank you,  Muriel   complains of pain/discomfort

## 2022-09-02 NOTE — ED ADULT NURSE NOTE - CHIEF COMPLAINT QUOTE
daughter states pt has been holding pampers, reporting pain, acting more "confused" for past 2 days- hx dementia  Alma Rosa (daughter) [phone]

## 2022-09-02 NOTE — ED PROVIDER NOTE - PATIENT PORTAL LINK FT
You can access the FollowMyHealth Patient Portal offered by [facility] by registering at the following website: [email or web address]. By joining [employer]’s FollowMyHealth portal, you will also be able to view your health information using other applications (apps) compatible with our system.

## 2022-09-02 NOTE — ED ADULT NURSE NOTE - OBJECTIVE STATEMENT
break coverage RN - Pt received in room 16 A&OX1-2 with daughter states pt has been holding pampers, reporting pain, acting more "confused" for past 2 days. has hx of UTI with similar presentation in past. Afebrile. Abd soft non distended. Resp even and unlabored. Labs drawn and sent. Will continue to monitor.

## 2022-09-02 NOTE — ED ADULT TRIAGE NOTE - CHIEF COMPLAINT QUOTE
daughter states pt has been holding pampers, reporting pain, acting more "confused" for past 2 days- hx dementia  Alma Rosa (daughter) [phone]

## 2022-09-02 NOTE — ED PROVIDER NOTE - NSICDXPASTSURGICALHX_GEN_ALL_CORE_FT
PAST SURGICAL HISTORY:  No significant past surgical history     S/P ORIF (open reduction internal fixation) fracture

## 2022-09-02 NOTE — ED PROVIDER NOTE - PROGRESS NOTE DETAILS
Rebecca BRIGHT: Pt assessed at beside. Pt resting comfortably, pain controlled, pt questions answered. Vital signs stable. Daughter informed of results, comfortable and requesting discharge for her mother, will give abx, Strict return precautions were discussed. Pt expressed understanding.

## 2022-09-02 NOTE — ED PROVIDER NOTE - NSICDXPASTMEDICALHX_GEN_ALL_CORE_FT
PAST MEDICAL HISTORY:  CVA (cerebrovascular accident) 2006    Dementia AOx1 at baseline    HLD (hyperlipidemia)     HTN (hypertension)     Hypertension     Stroke

## 2022-09-02 NOTE — ED PROVIDER NOTE - CLINICAL SUMMARY MEDICAL DECISION MAKING FREE TEXT BOX
Rebecca BRIGHT: 82F hx of dementia, presents with daughter w a cc of c/f UTI per daughter pt has been a little more lethargic over last few days, and has been holding lower abdomen, c/w prior episodes of UTI, seen in ED for similar in past reports in past got better with antibiotics, no fever, no vomiting. Noted triage note however daughter reports pt is acting like her normal self, lives with daughter exam vss non toxic PE as above ddx c/f likely uti vs other infxn, low c/f head trauma, ich, given no trauma, pt acting like normal self per daughter, will check labs urine cxr meds as needed, reassess.

## 2022-09-02 NOTE — ED PROVIDER NOTE - NSFOLLOWUPINSTRUCTIONS_ED_ALL_ED_FT
Thank you for visiting our Emergency Department, it has been a pleasure taking part in your healthcare.    Your discharge diagnosis is: UTI  Please take all discharge medications as indicated below:  Vantin 200mg twice a day x7 days  Please follow up with your PMD within x48 hours.  A copy of resulted labs, imaging, and findings have been provided to you.   You have had a detailed discussion with your provider regarding your diagnosis, care management and discharge planning including, but not limited to: return precautions, follow up visits with existing or new providers, new prescriptions and/or medication changes, wound and/or splint/cast care or other care   aspects specific to your diagnosis and treatment. You have been given the opportunity to have your questions answered. At this time you have been deemed stable and fit for discharge.  Return precautions to the Emergency Department include but are not limited to: unrelenting nausea, vomiting, fever, chills, chest pain, shortness of breath, dizziness, chest or abdominal pain, worsening back pain, syncope, blood in urine or stool, headache that doesn't resolve, numbness or tingling, loss of sensation, loss of motor function, or any other concerning symptoms.

## 2022-09-03 VITALS
OXYGEN SATURATION: 98 % | RESPIRATION RATE: 16 BRPM | HEART RATE: 74 BPM | SYSTOLIC BLOOD PRESSURE: 166 MMHG | TEMPERATURE: 98 F | DIASTOLIC BLOOD PRESSURE: 75 MMHG

## 2022-09-03 LAB
ALBUMIN SERPL ELPH-MCNC: 3.4 G/DL — SIGNIFICANT CHANGE UP (ref 3.3–5)
ALP SERPL-CCNC: 108 U/L — SIGNIFICANT CHANGE UP (ref 40–120)
ALT FLD-CCNC: 14 U/L — SIGNIFICANT CHANGE UP (ref 4–33)
ANION GAP SERPL CALC-SCNC: 13 MMOL/L — SIGNIFICANT CHANGE UP (ref 7–14)
APPEARANCE UR: ABNORMAL
AST SERPL-CCNC: 22 U/L — SIGNIFICANT CHANGE UP (ref 4–32)
BACTERIA # UR AUTO: ABNORMAL
BILIRUB SERPL-MCNC: <0.2 MG/DL — SIGNIFICANT CHANGE UP (ref 0.2–1.2)
BILIRUB UR-MCNC: NEGATIVE — SIGNIFICANT CHANGE UP
BUN SERPL-MCNC: 9 MG/DL — SIGNIFICANT CHANGE UP (ref 7–23)
CALCIUM SERPL-MCNC: 9.2 MG/DL — SIGNIFICANT CHANGE UP (ref 8.4–10.5)
CHLORIDE SERPL-SCNC: 100 MMOL/L — SIGNIFICANT CHANGE UP (ref 98–107)
CO2 SERPL-SCNC: 22 MMOL/L — SIGNIFICANT CHANGE UP (ref 22–31)
COLOR SPEC: SIGNIFICANT CHANGE UP
CREAT SERPL-MCNC: 0.76 MG/DL — SIGNIFICANT CHANGE UP (ref 0.5–1.3)
DIFF PNL FLD: NEGATIVE — SIGNIFICANT CHANGE UP
EGFR: 78 ML/MIN/1.73M2 — SIGNIFICANT CHANGE UP
EPI CELLS # UR: 5 /HPF — SIGNIFICANT CHANGE UP (ref 0–5)
GLUCOSE SERPL-MCNC: 74 MG/DL — SIGNIFICANT CHANGE UP (ref 70–99)
GLUCOSE UR QL: NEGATIVE — SIGNIFICANT CHANGE UP
KETONES UR-MCNC: NEGATIVE — SIGNIFICANT CHANGE UP
LEUKOCYTE ESTERASE UR-ACNC: ABNORMAL
NITRITE UR-MCNC: NEGATIVE — SIGNIFICANT CHANGE UP
PH UR: 7.5 — SIGNIFICANT CHANGE UP (ref 5–8)
POTASSIUM SERPL-MCNC: 4.2 MMOL/L — SIGNIFICANT CHANGE UP (ref 3.5–5.3)
POTASSIUM SERPL-SCNC: 4.2 MMOL/L — SIGNIFICANT CHANGE UP (ref 3.5–5.3)
PROT SERPL-MCNC: 6.3 G/DL — SIGNIFICANT CHANGE UP (ref 6–8.3)
PROT UR-MCNC: NEGATIVE — SIGNIFICANT CHANGE UP
RBC CASTS # UR COMP ASSIST: 3 /HPF — SIGNIFICANT CHANGE UP (ref 0–4)
SODIUM SERPL-SCNC: 135 MMOL/L — SIGNIFICANT CHANGE UP (ref 135–145)
SP GR SPEC: 1.01 — SIGNIFICANT CHANGE UP (ref 1.01–1.05)
UROBILINOGEN FLD QL: SIGNIFICANT CHANGE UP
WBC UR QL: 17 /HPF — HIGH (ref 0–5)

## 2022-09-03 PROCEDURE — 71045 X-RAY EXAM CHEST 1 VIEW: CPT | Mod: 26

## 2022-09-03 RX ORDER — CEFPODOXIME PROXETIL 100 MG
1 TABLET ORAL
Qty: 14 | Refills: 0
Start: 2022-09-03 | End: 2022-11-11

## 2022-09-03 RX ORDER — CEFPODOXIME PROXETIL 100 MG
200 TABLET ORAL ONCE
Refills: 0 | Status: COMPLETED | OUTPATIENT
Start: 2022-09-03 | End: 2022-09-03

## 2022-09-03 RX ORDER — CEFPODOXIME PROXETIL 100 MG
1 TABLET ORAL
Qty: 14 | Refills: 0
Start: 2022-09-03 | End: 2022-09-09

## 2022-09-03 RX ADMIN — Medication 200 MILLIGRAM(S): at 01:28

## 2022-09-03 NOTE — ED ADULT NURSE REASSESSMENT NOTE - NS ED NURSE REASSESS COMMENT FT1
pt received from break coverage RN. pt A&Ox1-2, as per family this is baseline dementia, pt in no apparent distress, respiration even and non-labored. pt on purwick, clear yellow urine noted. Urine sent.

## 2022-09-04 PROBLEM — I63.9 CEREBRAL INFARCTION, UNSPECIFIED: Chronic | Status: ACTIVE | Noted: 2021-12-06

## 2022-09-04 PROBLEM — E78.5 HYPERLIPIDEMIA, UNSPECIFIED: Chronic | Status: ACTIVE | Noted: 2018-09-11

## 2022-09-04 PROBLEM — I10 ESSENTIAL (PRIMARY) HYPERTENSION: Chronic | Status: ACTIVE | Noted: 2018-06-19

## 2022-09-04 PROBLEM — F03.90 UNSPECIFIED DEMENTIA WITHOUT BEHAVIORAL DISTURBANCE: Chronic | Status: ACTIVE | Noted: 2018-06-19

## 2022-09-05 LAB
-  AMIKACIN: SIGNIFICANT CHANGE UP
-  AMOXICILLIN/CLAVULANIC ACID: SIGNIFICANT CHANGE UP
-  AMPICILLIN/SULBACTAM: SIGNIFICANT CHANGE UP
-  AMPICILLIN: SIGNIFICANT CHANGE UP
-  AMPICILLIN: SIGNIFICANT CHANGE UP
-  AZTREONAM: SIGNIFICANT CHANGE UP
-  CEFAZOLIN: SIGNIFICANT CHANGE UP
-  CEFEPIME: SIGNIFICANT CHANGE UP
-  CEFOXITIN: SIGNIFICANT CHANGE UP
-  CEFTRIAXONE: SIGNIFICANT CHANGE UP
-  CIPROFLOXACIN: SIGNIFICANT CHANGE UP
-  CIPROFLOXACIN: SIGNIFICANT CHANGE UP
-  ERTAPENEM: SIGNIFICANT CHANGE UP
-  GENTAMICIN: SIGNIFICANT CHANGE UP
-  IMIPENEM: SIGNIFICANT CHANGE UP
-  LEVOFLOXACIN: SIGNIFICANT CHANGE UP
-  LEVOFLOXACIN: SIGNIFICANT CHANGE UP
-  MEROPENEM: SIGNIFICANT CHANGE UP
-  NITROFURANTOIN: SIGNIFICANT CHANGE UP
-  NITROFURANTOIN: SIGNIFICANT CHANGE UP
-  PIPERACILLIN/TAZOBACTAM: SIGNIFICANT CHANGE UP
-  TETRACYCLINE: SIGNIFICANT CHANGE UP
-  TIGECYCLINE: SIGNIFICANT CHANGE UP
-  TOBRAMYCIN: SIGNIFICANT CHANGE UP
-  TRIMETHOPRIM/SULFAMETHOXAZOLE: SIGNIFICANT CHANGE UP
-  VANCOMYCIN: SIGNIFICANT CHANGE UP
CULTURE RESULTS: SIGNIFICANT CHANGE UP
METHOD TYPE: SIGNIFICANT CHANGE UP
METHOD TYPE: SIGNIFICANT CHANGE UP
ORGANISM # SPEC MICROSCOPIC CNT: SIGNIFICANT CHANGE UP
SPECIMEN SOURCE: SIGNIFICANT CHANGE UP

## 2022-09-06 NOTE — ED POST DISCHARGE NOTE - RESULT SUMMARY
UXC: E. Coli  50,000-99,000 CFU/ml and UCX : Enterococcus faecalis 10,000-49,000CFU/ml . No antibiotic listed in ED provider note or prescription writer at time of discharge. Patient contact # [phone] S/W patient's daughter who asked me to call other daughter  message left with Call Back  P.A. number and hours for return call back.

## 2022-09-22 NOTE — H&P ADULT - ATTENDING COMMENTS
no 81F Islam (per HHA - will have to confirm), with PMH of HTN, HLD, Alzheimer's dementia, stage 1 sacral ulcers who presents from home w/ increased fatigue and new onset of sores/blisters on her back. Per aid, pt has been declining over the past week. She has been essentially bedbound in the past few days. She reports noticing foul smelling urine. She reports clear liquid discharge from sore/blisters on her back. No reported fever. No recent falls at home. Pt reportedly had hip replaced around 2018 at a [place] hospital. Otherwise, limited history could be obtained from patient. Rest of hx as noted above. She appeared comfortable, but did not want to speak. On arrival, noted to be markedly hypotensive 80s/50s, hypothermic, and hypoxic. Improved after IVF and abx. Initial labs notable for leukocytosis, elevated Cr, and elevated inflammatory markers. Lactate elevated to 3.2. Imaging showed right femoral ORIF w/ associated right hip soft tissue defect, SQ infiltration/edema. Incidental endometrial thickening noted as well. Admit for severe sepsis due to possible hip hardware infection vs local soft tissue infection..    -Continue IV abx broad spectrum. vanco trough as per protocol. F/u cultures.  -C/s orthopedics for further input.   -Consider ID pending additional diagnostics.  -No prior labs available for comparison -- trend Cr, could be underlying CKD vs. TERENCE. Avoid nephrotoxins, renally dose meds.   -Follow up lactate.  -Endometrial thickening is noted - however, consider age/cormobidities - further investigation is unlikely to alter overall prognosis. Will further discuss w/ family.    Rest of plan as above.

## 2022-09-24 ENCOUNTER — EMERGENCY (EMERGENCY)
Facility: HOSPITAL | Age: 82
LOS: 1 days | Discharge: HOME | End: 2022-09-24
Attending: EMERGENCY MEDICINE | Admitting: EMERGENCY MEDICINE

## 2022-09-24 VITALS
HEART RATE: 74 BPM | OXYGEN SATURATION: 96 % | TEMPERATURE: 98 F | SYSTOLIC BLOOD PRESSURE: 138 MMHG | HEIGHT: 62 IN | RESPIRATION RATE: 18 BRPM | DIASTOLIC BLOOD PRESSURE: 79 MMHG

## 2022-09-24 VITALS — TEMPERATURE: 99 F

## 2022-09-24 DIAGNOSIS — Z98.890 OTHER SPECIFIED POSTPROCEDURAL STATES: Chronic | ICD-10-CM

## 2022-09-24 LAB
ALBUMIN SERPL ELPH-MCNC: 3.9 G/DL — SIGNIFICANT CHANGE UP (ref 3.3–5)
ALP SERPL-CCNC: 107 U/L — SIGNIFICANT CHANGE UP (ref 40–120)
ALT FLD-CCNC: 15 U/L — SIGNIFICANT CHANGE UP (ref 4–33)
ANION GAP SERPL CALC-SCNC: 11 MMOL/L — SIGNIFICANT CHANGE UP (ref 7–14)
APPEARANCE UR: CLEAR — SIGNIFICANT CHANGE UP
AST SERPL-CCNC: 20 U/L — SIGNIFICANT CHANGE UP (ref 4–32)
BACTERIA # UR AUTO: ABNORMAL
BASE EXCESS BLDV CALC-SCNC: 4.3 MMOL/L — HIGH (ref -2–3)
BASOPHILS # BLD AUTO: 0.03 K/UL — SIGNIFICANT CHANGE UP (ref 0–0.2)
BASOPHILS NFR BLD AUTO: 0.4 % — SIGNIFICANT CHANGE UP (ref 0–2)
BILIRUB SERPL-MCNC: 0.2 MG/DL — SIGNIFICANT CHANGE UP (ref 0.2–1.2)
BILIRUB UR-MCNC: NEGATIVE — SIGNIFICANT CHANGE UP
BUN SERPL-MCNC: 15 MG/DL — SIGNIFICANT CHANGE UP (ref 7–23)
CA-I SERPL-SCNC: 1.27 MMOL/L — SIGNIFICANT CHANGE UP (ref 1.15–1.33)
CALCIUM SERPL-MCNC: 9.5 MG/DL — SIGNIFICANT CHANGE UP (ref 8.4–10.5)
CHLORIDE BLDV-SCNC: 105 MMOL/L — SIGNIFICANT CHANGE UP (ref 96–108)
CHLORIDE SERPL-SCNC: 107 MMOL/L — SIGNIFICANT CHANGE UP (ref 98–107)
CO2 BLDV-SCNC: 31.8 MMOL/L — HIGH (ref 22–26)
CO2 SERPL-SCNC: 26 MMOL/L — SIGNIFICANT CHANGE UP (ref 22–31)
COLOR SPEC: COLORLESS — SIGNIFICANT CHANGE UP
CREAT SERPL-MCNC: 0.64 MG/DL — SIGNIFICANT CHANGE UP (ref 0.5–1.3)
DIFF PNL FLD: ABNORMAL
EGFR: 88 ML/MIN/1.73M2 — SIGNIFICANT CHANGE UP
EOSINOPHIL # BLD AUTO: 0.13 K/UL — SIGNIFICANT CHANGE UP (ref 0–0.5)
EOSINOPHIL NFR BLD AUTO: 1.7 % — SIGNIFICANT CHANGE UP (ref 0–6)
EPI CELLS # UR: 1 /HPF — SIGNIFICANT CHANGE UP (ref 0–5)
GAS PNL BLDV: 138 MMOL/L — SIGNIFICANT CHANGE UP (ref 136–145)
GAS PNL BLDV: SIGNIFICANT CHANGE UP
GLUCOSE BLDV-MCNC: 89 MG/DL — SIGNIFICANT CHANGE UP (ref 70–99)
GLUCOSE SERPL-MCNC: 90 MG/DL — SIGNIFICANT CHANGE UP (ref 70–99)
GLUCOSE UR QL: NEGATIVE — SIGNIFICANT CHANGE UP
HCO3 BLDV-SCNC: 30 MMOL/L — HIGH (ref 22–29)
HCT VFR BLD CALC: 39 % — SIGNIFICANT CHANGE UP (ref 34.5–45)
HCT VFR BLDA CALC: 40 % — SIGNIFICANT CHANGE UP (ref 34.5–46.5)
HGB BLD CALC-MCNC: 13.3 G/DL — SIGNIFICANT CHANGE UP (ref 11.5–15.5)
HGB BLD-MCNC: 13 G/DL — SIGNIFICANT CHANGE UP (ref 11.5–15.5)
HYALINE CASTS # UR AUTO: 0 /LPF — SIGNIFICANT CHANGE UP (ref 0–7)
IANC: 3.76 K/UL — SIGNIFICANT CHANGE UP (ref 1.8–7.4)
IMM GRANULOCYTES NFR BLD AUTO: 0.3 % — SIGNIFICANT CHANGE UP (ref 0–0.9)
KETONES UR-MCNC: NEGATIVE — SIGNIFICANT CHANGE UP
LACTATE BLDV-MCNC: 1.3 MMOL/L — SIGNIFICANT CHANGE UP (ref 0.5–2)
LEUKOCYTE ESTERASE UR-ACNC: ABNORMAL
LYMPHOCYTES # BLD AUTO: 3.06 K/UL — SIGNIFICANT CHANGE UP (ref 1–3.3)
LYMPHOCYTES # BLD AUTO: 40.9 % — SIGNIFICANT CHANGE UP (ref 13–44)
MCHC RBC-ENTMCNC: 29.7 PG — SIGNIFICANT CHANGE UP (ref 27–34)
MCHC RBC-ENTMCNC: 33.3 GM/DL — SIGNIFICANT CHANGE UP (ref 32–36)
MCV RBC AUTO: 89.2 FL — SIGNIFICANT CHANGE UP (ref 80–100)
MONOCYTES # BLD AUTO: 0.48 K/UL — SIGNIFICANT CHANGE UP (ref 0–0.9)
MONOCYTES NFR BLD AUTO: 6.4 % — SIGNIFICANT CHANGE UP (ref 2–14)
NEUTROPHILS # BLD AUTO: 3.76 K/UL — SIGNIFICANT CHANGE UP (ref 1.8–7.4)
NEUTROPHILS NFR BLD AUTO: 50.3 % — SIGNIFICANT CHANGE UP (ref 43–77)
NITRITE UR-MCNC: NEGATIVE — SIGNIFICANT CHANGE UP
NRBC # BLD: 0 /100 WBCS — SIGNIFICANT CHANGE UP (ref 0–0)
NRBC # FLD: 0 K/UL — SIGNIFICANT CHANGE UP (ref 0–0)
PCO2 BLDV: 50 MMHG — HIGH (ref 39–42)
PH BLDV: 7.39 — SIGNIFICANT CHANGE UP (ref 7.32–7.43)
PH UR: 7 — SIGNIFICANT CHANGE UP (ref 5–8)
PLATELET # BLD AUTO: 116 K/UL — LOW (ref 150–400)
PO2 BLDV: 28 MMHG — SIGNIFICANT CHANGE UP
POTASSIUM BLDV-SCNC: 4.5 MMOL/L — SIGNIFICANT CHANGE UP (ref 3.5–5.1)
POTASSIUM SERPL-MCNC: 4.5 MMOL/L — SIGNIFICANT CHANGE UP (ref 3.5–5.3)
POTASSIUM SERPL-SCNC: 4.5 MMOL/L — SIGNIFICANT CHANGE UP (ref 3.5–5.3)
PROT SERPL-MCNC: 6.8 G/DL — SIGNIFICANT CHANGE UP (ref 6–8.3)
PROT UR-MCNC: ABNORMAL
RBC # BLD: 4.37 M/UL — SIGNIFICANT CHANGE UP (ref 3.8–5.2)
RBC # FLD: 16.7 % — HIGH (ref 10.3–14.5)
RBC CASTS # UR COMP ASSIST: 3 /HPF — SIGNIFICANT CHANGE UP (ref 0–4)
SAO2 % BLDV: 31.7 % — SIGNIFICANT CHANGE UP
SODIUM SERPL-SCNC: 144 MMOL/L — SIGNIFICANT CHANGE UP (ref 135–145)
SP GR SPEC: 1.01 — SIGNIFICANT CHANGE UP (ref 1.01–1.05)
UROBILINOGEN FLD QL: SIGNIFICANT CHANGE UP
WBC # BLD: 7.48 K/UL — SIGNIFICANT CHANGE UP (ref 3.8–10.5)
WBC # FLD AUTO: 7.48 K/UL — SIGNIFICANT CHANGE UP (ref 3.8–10.5)
WBC UR QL: 5 /HPF — SIGNIFICANT CHANGE UP (ref 0–5)

## 2022-09-24 PROCEDURE — 99284 EMERGENCY DEPT VISIT MOD MDM: CPT | Mod: GC

## 2022-09-24 NOTE — ED ADULT NURSE NOTE - INTERVENTIONS DEFINITIONS
[place] to call system/Call bell, personal items and telephone within reach/Instruct patient to call for assistance/Physically safe environment: no spills, clutter or unnecessary equipment/Stretcher in lowest position, wheels locked, appropriate side rails in place/Monitor gait and stability/Monitor for mental status changes and reorient to person, place, and time/Review medications for side effects contributing to fall risk/Reinforce activity limits and safety measures with patient and family

## 2022-09-24 NOTE — ED PROVIDER NOTE - PHYSICAL EXAMINATION
ATTENDING PHYSICAL EXAM  GEN - Awake.  Alert.  Answers to name.  Does not answer other questions.  HEAD - NC/AT  NECK: Neck supple  PULMONARY - CTA b/l, symmetric breath sounds, no w/r/r  CARDIAC -s1s2, RRR, no M,R,G  ABDOMEN - +NABS, ND, NT, soft, no guarding, no rebound, no masses   BACK - no CVA tenderness, No vertebral or paravertebral tenderness  EXTREMITIES - symmetric pulses, 2+ dp, capillary refill < 2 seconds, no clubbing, no cyanosis, no edema

## 2022-09-24 NOTE — ED PROVIDER NOTE - OBJECTIVE STATEMENT
82y female pt PMHx HTN, 82y female pt PMHx HTN, CVA 82y female pt PMHx HTN, CVA, dementia, non verbal who presents to the ED with daughter for concerns of UTI. Per daughter, patient has been grimacing, placing hands down diaper and eating less since a couple of days. Usually similar symptoms with UTIs in the past. Denies fevers, chills, hematuria, vomiting or diarrhea.     On physical exam grimacing with suprapubic palpation. 82y female pt PMHx HTN, CVA, dementia, non verbal who presents to the ED with daughter for concerns of UTI. Per daughter, patient has been grimacing, placing hands down diaper and eating less since a couple of days. Usually similar symptoms with UTIs in the past. Denies fevers, chills, hematuria, vomiting or diarrhea.     On physical exam grimacing with suprapubic palpation.  Attending - Agree with above.  I evaluated patient myself. 81 y/o F with PMH including dementia.  Daughter at bedside providing history.  Patient lives with daughter.  Ambulates with walker.  Incontinent, uses diaper.  Daughter noted that patient had been hands into diaper, and when she did this previously was found to have UTI, so brought to ED to evaluate for that.  Denies other change in behavior.  No fever, cough, recent covid.  At baseline mental status.

## 2022-09-24 NOTE — ED PROVIDER NOTE - NSFOLLOWUPINSTRUCTIONS_ED_ALL_ED_FT
You were seen for a urine infection today. Please take and finish the antibiotic course. Important to follow up with your primary care doctor for further management.     May continue taking your home medications.     Please return to the ED if you have any fevers, lethargy, confusion or any other concerns.      Urinary Tract Infection in Older Adults    WHAT YOU NEED TO KNOW:    What is a urinary tract infection (UTI)? A UTI is caused by bacteria that get inside your urinary tract. Your urinary tract includes your kidneys, ureters, bladder, and urethra. A UTI is more common in your lower urinary tract, which includes your bladder and urethra.  Kidney, Ureters, Bladder         What increases my risk for a UTI?   •A UTI within the last 3 months      •Menopause in women      •Enlarged prostate in men      •Medicines that affect urination      •Urinary incontinence (you cannot control your bladder)      •Sexual intercourse      •Medical conditions, such as diabetes or obesity      •Urinary tract problems, such as a narrowing, kidney stones, or inability to empty your bladder completely      What are the signs and symptoms of a UTI?   •Fever and chills      •Pain or burning when you urinate      •Urine that smells bad or looks cloudy, or blood in your urine      •Urinating more often or waking from sleep to urinate      •Sudden, strong need to urinate      •Pain or pressure in your lower abdomen      •Leaking urine      •Confusion or agitation      •Fatigue, shakiness, and weakness      How is a UTI diagnosed? Your healthcare provider will ask about your symptoms. He or she may also examine you. You may need any of the following:   •A urinalysis will give information about your urinary tract and overall health.      •A urine culture may show the type of germ causing the infection. You may need this test again if you continue to have signs and symptoms after a UTI is treated.      How is a UTI treated? Medicines treat the bacterial infection or decrease pain and burning when you urinate. You may also need medicines to decrease the urge to urinate often. If you have UTIs often (called recurrent UTIs), you may be given antibiotics to take regularly. You will be given directions for when and how to use antibiotics. The goal is to prevent UTIs but not cause antibiotic resistance by using antibiotics too often.    How can I manage my symptoms?   •Drink liquids as directed. Liquids can help flush bacteria from your urinary tract. Ask how much liquid to drink each day and which liquids are best for you. You may need to drink more liquids than usual to help flush out the bacteria. Do not drink alcohol, caffeine, and citrus juices. These can irritate your bladder and increase your symptoms.      •Apply heat on your abdomen for 20 to 30 minutes every 2 hours for as many days as directed. Heat helps decrease discomfort and pressure in your bladder.      How can I help prevent a UTI?   •Urinate when you feel the urge. Do not hold your urine. Bacteria can grow if urine stays in the bladder too long. It may be helpful to urinate at least every 3 to 4 hours.      •Urinate after you have sex. This will help flush away bacteria that can enter your urinary tract during sex.      •Wear cotton underwear and clothes that are loose. Tight pants and nylon underwear can trap moisture and cause bacteria to grow.      •Drink cranberry juice or take cranberry supplements. These may help prevent UTIs. Your healthcare provider can recommend the right juice or supplement for you.      •Women should wipe front to back after urinating or having a bowel movement. This may prevent germs from getting into the urinary tract. Do not douche or use feminine deodorants. These can change the chemical balance in your vagina. You may also be given vaginal estrogen medicine. This medicine helps prevent recurrent UTIs in women who have gone through menopause or are in cy-menopause.      When should I seek immediate care?   •You become confused or agitated.      •You fall down.      •You are urinating very little or not at all.      •You are vomiting.      •You have a high fever with shaking chills.       •You have side or back pain that gets worse.      When should I call my doctor?   •You have a fever.      •You are a woman and you have increased white or yellow discharge from your vagina.      •You do not feel better after 2 days of taking antibiotics.      •You have questions or concerns about your condition or care.

## 2022-09-24 NOTE — ED ADULT NURSE NOTE - OBJECTIVE STATEMENT
pt received in rm 26 AAO x 1 to self. pt with hx of CVA, dementia. as per pts daughter, she has noted pt to put hands in the diaper. pts daughter concerned pt has UTI. pt unable to answer assessment questions at this time. RR even and unlabored. 20g iv placed to right ac. will continue to monitor.

## 2022-09-24 NOTE — ED PROVIDER NOTE - CLINICAL SUMMARY MEDICAL DECISION MAKING FREE TEXT BOX
81 y/o F at baseline health per daughter besides noting that she has been putting hands into diaper, which she did previously when having a UTI.  No fever, CVAT.  Check labs, u/a, urine culture.

## 2022-09-24 NOTE — ED ADULT TRIAGE NOTE - CHIEF COMPLAINT QUOTE
Patient brought to ER by her daughter to rule out UTI. Pt has been putting her hand down into diaper, Patient also has dementia.

## 2022-09-24 NOTE — ED PROVIDER NOTE - PATIENT PORTAL LINK FT
You can access the FollowMyHealth Patient Portal offered by [facility] by registering at the following website: [email or web address]. By joining [employer]’s FollowMyHealth portal, you will also be able to view your health information using other applications (apps) compatible with our system.

## 2022-09-25 RX ORDER — NITROFURANTOIN MACROCRYSTAL 50 MG
100 CAPSULE ORAL ONCE
Refills: 0 | Status: COMPLETED | OUTPATIENT
Start: 2022-09-25 | End: 2022-09-25

## 2022-09-25 RX ORDER — NITROFURANTOIN MACROCRYSTAL 50 MG
1 CAPSULE ORAL
Qty: 20 | Refills: 0
Start: 2022-09-25 | End: 2022-10-04

## 2022-09-25 RX ADMIN — Medication 100 MILLIGRAM(S): at 00:43

## 2022-09-27 LAB
-  AMIKACIN: SIGNIFICANT CHANGE UP
-  AMOXICILLIN/CLAVULANIC ACID: SIGNIFICANT CHANGE UP
-  AMPICILLIN/SULBACTAM: SIGNIFICANT CHANGE UP
-  AMPICILLIN: SIGNIFICANT CHANGE UP
-  AZTREONAM: SIGNIFICANT CHANGE UP
-  CEFAZOLIN: SIGNIFICANT CHANGE UP
-  CEFEPIME: SIGNIFICANT CHANGE UP
-  CEFOXITIN: SIGNIFICANT CHANGE UP
-  CEFTRIAXONE: SIGNIFICANT CHANGE UP
-  CIPROFLOXACIN: SIGNIFICANT CHANGE UP
-  ERTAPENEM: SIGNIFICANT CHANGE UP
-  GENTAMICIN: SIGNIFICANT CHANGE UP
-  IMIPENEM: SIGNIFICANT CHANGE UP
-  LEVOFLOXACIN: SIGNIFICANT CHANGE UP
-  MEROPENEM: SIGNIFICANT CHANGE UP
-  NITROFURANTOIN: SIGNIFICANT CHANGE UP
-  PIPERACILLIN/TAZOBACTAM: SIGNIFICANT CHANGE UP
-  TIGECYCLINE: SIGNIFICANT CHANGE UP
-  TOBRAMYCIN: SIGNIFICANT CHANGE UP
-  TRIMETHOPRIM/SULFAMETHOXAZOLE: SIGNIFICANT CHANGE UP
METHOD TYPE: SIGNIFICANT CHANGE UP

## 2022-09-28 LAB
-  AMPICILLIN: SIGNIFICANT CHANGE UP
-  CIPROFLOXACIN: SIGNIFICANT CHANGE UP
-  LEVOFLOXACIN: SIGNIFICANT CHANGE UP
-  NITROFURANTOIN: SIGNIFICANT CHANGE UP
-  TETRACYCLINE: SIGNIFICANT CHANGE UP
-  VANCOMYCIN: SIGNIFICANT CHANGE UP
CULTURE RESULTS: SIGNIFICANT CHANGE UP
METHOD TYPE: SIGNIFICANT CHANGE UP
ORGANISM # SPEC MICROSCOPIC CNT: SIGNIFICANT CHANGE UP
SPECIMEN SOURCE: SIGNIFICANT CHANGE UP

## 2022-11-04 ENCOUNTER — EMERGENCY (EMERGENCY)
Facility: HOSPITAL | Age: 82
LOS: 1 days | Discharge: HOME | End: 2022-11-04
Attending: EMERGENCY MEDICINE | Admitting: EMERGENCY MEDICINE

## 2022-11-04 VITALS
TEMPERATURE: 98 F | HEART RATE: 100 BPM | RESPIRATION RATE: 16 BRPM | SYSTOLIC BLOOD PRESSURE: 130 MMHG | OXYGEN SATURATION: 100 % | HEIGHT: 62 IN | DIASTOLIC BLOOD PRESSURE: 77 MMHG

## 2022-11-04 DIAGNOSIS — Z98.890 OTHER SPECIFIED POSTPROCEDURAL STATES: Chronic | ICD-10-CM

## 2022-11-04 PROCEDURE — 99284 EMERGENCY DEPT VISIT MOD MDM: CPT | Mod: GC

## 2022-11-04 NOTE — ED ADULT TRIAGE NOTE - CHIEF COMPLAINT QUOTE
Pt brought in by daughter for possible UTI. Pt has PMH of dementia, HTN, CVA no residual. Pt is non verbal at baseline. per pt daughter pt has been weak, grabbing at her bladder, dec PO intake and it mimic the last time she had a UTI.

## 2022-11-05 VITALS
OXYGEN SATURATION: 100 % | TEMPERATURE: 98 F | SYSTOLIC BLOOD PRESSURE: 112 MMHG | DIASTOLIC BLOOD PRESSURE: 87 MMHG | HEART RATE: 98 BPM | RESPIRATION RATE: 18 BRPM

## 2022-11-05 LAB
ALBUMIN SERPL ELPH-MCNC: 3.7 G/DL — SIGNIFICANT CHANGE UP (ref 3.3–5)
ALP SERPL-CCNC: 110 U/L — SIGNIFICANT CHANGE UP (ref 40–120)
ALT FLD-CCNC: 18 U/L — SIGNIFICANT CHANGE UP (ref 4–33)
ANION GAP SERPL CALC-SCNC: 11 MMOL/L — SIGNIFICANT CHANGE UP (ref 7–14)
ANISOCYTOSIS BLD QL: SLIGHT — SIGNIFICANT CHANGE UP
APPEARANCE UR: CLEAR — SIGNIFICANT CHANGE UP
AST SERPL-CCNC: 30 U/L — SIGNIFICANT CHANGE UP (ref 4–32)
B PERT DNA SPEC QL NAA+PROBE: SIGNIFICANT CHANGE UP
B PERT+PARAPERT DNA PNL SPEC NAA+PROBE: SIGNIFICANT CHANGE UP
BACTERIA # UR AUTO: ABNORMAL
BASOPHILS # BLD AUTO: 0.03 K/UL — SIGNIFICANT CHANGE UP (ref 0–0.2)
BASOPHILS NFR BLD AUTO: 0.4 % — SIGNIFICANT CHANGE UP (ref 0–2)
BILIRUB SERPL-MCNC: 0.2 MG/DL — SIGNIFICANT CHANGE UP (ref 0.2–1.2)
BILIRUB UR-MCNC: NEGATIVE — SIGNIFICANT CHANGE UP
BORDETELLA PARAPERTUSSIS (RAPRVP): SIGNIFICANT CHANGE UP
BUN SERPL-MCNC: 11 MG/DL — SIGNIFICANT CHANGE UP (ref 7–23)
C PNEUM DNA SPEC QL NAA+PROBE: SIGNIFICANT CHANGE UP
CALCIUM SERPL-MCNC: 9.4 MG/DL — SIGNIFICANT CHANGE UP (ref 8.4–10.5)
CHLORIDE SERPL-SCNC: 103 MMOL/L — SIGNIFICANT CHANGE UP (ref 98–107)
CO2 SERPL-SCNC: 25 MMOL/L — SIGNIFICANT CHANGE UP (ref 22–31)
COLOR SPEC: YELLOW — SIGNIFICANT CHANGE UP
CREAT SERPL-MCNC: 0.64 MG/DL — SIGNIFICANT CHANGE UP (ref 0.5–1.3)
DIFF PNL FLD: NEGATIVE — SIGNIFICANT CHANGE UP
EGFR: 88 ML/MIN/1.73M2 — SIGNIFICANT CHANGE UP
EOSINOPHIL # BLD AUTO: 0.17 K/UL — SIGNIFICANT CHANGE UP (ref 0–0.5)
EOSINOPHIL NFR BLD AUTO: 2.1 % — SIGNIFICANT CHANGE UP (ref 0–6)
EPI CELLS # UR: 1 /HPF — SIGNIFICANT CHANGE UP (ref 0–5)
FLUAV SUBTYP SPEC NAA+PROBE: SIGNIFICANT CHANGE UP
FLUBV RNA SPEC QL NAA+PROBE: SIGNIFICANT CHANGE UP
GIANT PLATELETS BLD QL SMEAR: PRESENT — SIGNIFICANT CHANGE UP
GLUCOSE SERPL-MCNC: 96 MG/DL — SIGNIFICANT CHANGE UP (ref 70–99)
GLUCOSE UR QL: NEGATIVE — SIGNIFICANT CHANGE UP
HADV DNA SPEC QL NAA+PROBE: SIGNIFICANT CHANGE UP
HCOV 229E RNA SPEC QL NAA+PROBE: SIGNIFICANT CHANGE UP
HCOV HKU1 RNA SPEC QL NAA+PROBE: SIGNIFICANT CHANGE UP
HCOV NL63 RNA SPEC QL NAA+PROBE: SIGNIFICANT CHANGE UP
HCOV OC43 RNA SPEC QL NAA+PROBE: SIGNIFICANT CHANGE UP
HCT VFR BLD CALC: 36.9 % — SIGNIFICANT CHANGE UP (ref 34.5–45)
HGB BLD-MCNC: 12.5 G/DL — SIGNIFICANT CHANGE UP (ref 11.5–15.5)
HMPV RNA SPEC QL NAA+PROBE: SIGNIFICANT CHANGE UP
HPIV1 RNA SPEC QL NAA+PROBE: SIGNIFICANT CHANGE UP
HPIV2 RNA SPEC QL NAA+PROBE: SIGNIFICANT CHANGE UP
HPIV3 RNA SPEC QL NAA+PROBE: SIGNIFICANT CHANGE UP
HPIV4 RNA SPEC QL NAA+PROBE: SIGNIFICANT CHANGE UP
HYALINE CASTS # UR AUTO: 0 /LPF — SIGNIFICANT CHANGE UP (ref 0–7)
IANC: 4.23 K/UL — SIGNIFICANT CHANGE UP (ref 1.8–7.4)
IMM GRANULOCYTES NFR BLD AUTO: 0.4 % — SIGNIFICANT CHANGE UP (ref 0–0.9)
KETONES UR-MCNC: NEGATIVE — SIGNIFICANT CHANGE UP
LEUKOCYTE ESTERASE UR-ACNC: ABNORMAL
LYMPHOCYTES # BLD AUTO: 2.92 K/UL — SIGNIFICANT CHANGE UP (ref 1–3.3)
LYMPHOCYTES # BLD AUTO: 36.5 % — SIGNIFICANT CHANGE UP (ref 13–44)
M PNEUMO DNA SPEC QL NAA+PROBE: SIGNIFICANT CHANGE UP
MANUAL SMEAR VERIFICATION: SIGNIFICANT CHANGE UP
MCHC RBC-ENTMCNC: 30.3 PG — SIGNIFICANT CHANGE UP (ref 27–34)
MCHC RBC-ENTMCNC: 33.9 GM/DL — SIGNIFICANT CHANGE UP (ref 32–36)
MCV RBC AUTO: 89.6 FL — SIGNIFICANT CHANGE UP (ref 80–100)
MONOCYTES # BLD AUTO: 0.63 K/UL — SIGNIFICANT CHANGE UP (ref 0–0.9)
MONOCYTES NFR BLD AUTO: 7.9 % — SIGNIFICANT CHANGE UP (ref 2–14)
NEUTROPHILS # BLD AUTO: 4.23 K/UL — SIGNIFICANT CHANGE UP (ref 1.8–7.4)
NEUTROPHILS NFR BLD AUTO: 52.7 % — SIGNIFICANT CHANGE UP (ref 43–77)
NITRITE UR-MCNC: NEGATIVE — SIGNIFICANT CHANGE UP
NRBC # BLD: 0 /100 WBCS — SIGNIFICANT CHANGE UP (ref 0–0)
NRBC # FLD: 0 K/UL — SIGNIFICANT CHANGE UP (ref 0–0)
PH UR: 5.5 — SIGNIFICANT CHANGE UP (ref 5–8)
PLAT MORPH BLD: ABNORMAL
PLATELET # BLD AUTO: 123 K/UL — LOW (ref 150–400)
PLATELET COUNT - ESTIMATE: ABNORMAL
POTASSIUM SERPL-MCNC: 4.6 MMOL/L — SIGNIFICANT CHANGE UP (ref 3.5–5.3)
POTASSIUM SERPL-SCNC: 4.6 MMOL/L — SIGNIFICANT CHANGE UP (ref 3.5–5.3)
PROT SERPL-MCNC: 6.6 G/DL — SIGNIFICANT CHANGE UP (ref 6–8.3)
PROT UR-MCNC: NEGATIVE — SIGNIFICANT CHANGE UP
RAPID RVP RESULT: SIGNIFICANT CHANGE UP
RBC # BLD: 4.12 M/UL — SIGNIFICANT CHANGE UP (ref 3.8–5.2)
RBC # FLD: 16.6 % — HIGH (ref 10.3–14.5)
RBC BLD AUTO: NORMAL — SIGNIFICANT CHANGE UP
RBC CASTS # UR COMP ASSIST: 4 /HPF — SIGNIFICANT CHANGE UP (ref 0–4)
RSV RNA SPEC QL NAA+PROBE: SIGNIFICANT CHANGE UP
RV+EV RNA SPEC QL NAA+PROBE: SIGNIFICANT CHANGE UP
SARS-COV-2 RNA SPEC QL NAA+PROBE: SIGNIFICANT CHANGE UP
SODIUM SERPL-SCNC: 139 MMOL/L — SIGNIFICANT CHANGE UP (ref 135–145)
SP GR SPEC: 1.02 — SIGNIFICANT CHANGE UP (ref 1.01–1.05)
UROBILINOGEN FLD QL: SIGNIFICANT CHANGE UP
WBC # BLD: 8.01 K/UL — SIGNIFICANT CHANGE UP (ref 3.8–10.5)
WBC # FLD AUTO: 8.01 K/UL — SIGNIFICANT CHANGE UP (ref 3.8–10.5)
WBC UR QL: 37 /HPF — HIGH (ref 0–5)

## 2022-11-05 PROCEDURE — 71045 X-RAY EXAM CHEST 1 VIEW: CPT | Mod: 26

## 2022-11-05 RX ORDER — CEFTRIAXONE 500 MG/1
1000 INJECTION, POWDER, FOR SOLUTION INTRAMUSCULAR; INTRAVENOUS ONCE
Refills: 0 | Status: DISCONTINUED | OUTPATIENT
Start: 2022-11-05 | End: 2022-11-05

## 2022-11-05 RX ORDER — CEFPODOXIME PROXETIL 100 MG
200 TABLET ORAL ONCE
Refills: 0 | Status: COMPLETED | OUTPATIENT
Start: 2022-11-05 | End: 2022-11-05

## 2022-11-05 RX ORDER — CEFPODOXIME PROXETIL 100 MG
1 TABLET ORAL
Qty: 20 | Refills: 0
Start: 2022-11-05 | End: 2022-11-14

## 2022-11-05 RX ADMIN — Medication 200 MILLIGRAM(S): at 06:37

## 2022-11-05 NOTE — ED PROVIDER NOTE - NS ED ROS FT
ROS:  -Constitutional: Denies fever  -Head: Denies headache  -Eyes: Denies blurry vision  -Cardiovascular: Denies chest pain  -Pulmonary: Denies shortness of breath  -Gastrointestinal: Denies nausea or diarrhea  -Genitourinary: +suprapubic pain  -Skin: Denies new rashes  -Neuro: Denies numbness or tingling

## 2022-11-05 NOTE — ED ADULT NURSE NOTE - NSIMPLEMENTINTERV_GEN_ALL_ED
Implemented All Fall with Harm Risk Interventions:  [place] to call system. Call bell, personal items and telephone within reach. Instruct patient to call for assistance. Room bathroom lighting operational. Non-slip footwear when patient is off stretcher. Physically safe environment: no spills, clutter or unnecessary equipment. Stretcher in lowest position, wheels locked, appropriate side rails in place. Provide visual cue, wrist band, yellow gown, etc. Monitor gait and stability. Monitor for mental status changes and reorient to person, place, and time. Review medications for side effects contributing to fall risk. Reinforce activity limits and safety measures with patient and family. Provide visual clues: red socks.

## 2022-11-05 NOTE — ED PROVIDER NOTE - CLINICAL SUMMARY MEDICAL DECISION MAKING FREE TEXT BOX
Stacy Granados DO PGY-2   82y female pt PMHx HTN, CVA, dementia, non verbal who presents to the ED with daughter for concerns of UTI. Per daughter, patient has been grimacing, placing hands down diaper and eating less for the past day. Usually experiences similar symptoms with UTIs in the past. Hemodynamically stable, afebrile. Will obtain labs, cxr, rvp, UA and re-evaluate for dispo.

## 2022-11-05 NOTE — ED ADULT NURSE NOTE - OBJECTIVE STATEMENT
Received  pt to RM 17 daughter at bedside. Pt presently laying quielty HOB elevated. As per daughter I brought her here tonight because she is behaving like she has a UTI...she has dementia and does not talk but at home she can walk with walker and help....she is very weak and seems lethargic since yesterday she is not eating, grimacing and putting her hands down pants this is similar behaviour to when she had the last UTI ...she was here in Sept." Pt does open eyes and to tactile stimuli pt is grimacing. ... and attempts to resist any turning and positioning. Skin is warm and dry. vss Rectal temp 98.6. labs to follow. xray done. Pt arrives with stage 2 wounds on velma hips. left hip with noted closed old wound but just below noted new open skin tear/wound very shallow l apprx 1cm by 1cm , rt hip with noted open skin wound drk granular  center apprx 2cm by 1cm. MD Alonzo notified at bedside to christelle. Pt t&P q 2, kept clean and dry, diaper removed.   Daughter requesting not to catherize request to attempt to get urine via the Primafit device. Primafit in place.

## 2022-11-05 NOTE — ED PROVIDER NOTE - PHYSICAL EXAMINATION
PHYSICAL EXAM:  CONSTITUTIONAL: Elderly, thin, nonverbal (at baseline).  HEAD: Atraumatic  EYES: Clear bilaterally, pupils equal, round and reactive to light.  ENMT: Airway patent, Nasal mucosa clear. Mouth with normal mucosa. Uvula is midline.   CARDIAC: Normal rate, regular rhythm. +S1/S2. No murmurs, rubs or gallops.  RESPIRATORY: Breathing unlabored. Breath sounds clear and equal bilaterally.  ABDOMEN:  Soft, nontender, nondistended. No rebound tenderness or guarding.  FLANK: No CVA tenderness B/L.   NEUROLOGICAL: No focal deficits, no motor or sensory deficits.   MSK: No clubbing, cyanosis, or edema. Full range of motion of all extremities. No tenderness to palpation.   SKIN: Skin warm and dry. No evidence of rashes or lesions.

## 2022-11-05 NOTE — ED PROVIDER NOTE - PROGRESS NOTE DETAILS
Stacy Granados DO PGY-2   Discussed plan with daughter. Daughter states patient lives at home with daughter and has aide for assistance. States preference for discharging patient to home. Dispo still pending work up.

## 2022-11-05 NOTE — ED PROVIDER NOTE - ATTENDING CONTRIBUTION TO CARE
Elderly female, nonverbal secondary to CVA with medical history as above presents to the emergency department with her daughter for concern for UTI.  Patient unable to provide any history, daughter states that patient has been putting her hand in her diaper which is her usual sign when she has a UTI, she also appears more fatigued than usual.  On exam her abdomen is soft nontender she is not in any respiratory distress, she does not bilateral stage II ulcers to the hip area, no evidence of infection.  Plan for labs and infectious work-up, reassess.

## 2022-11-05 NOTE — ED PROVIDER NOTE - PATIENT PORTAL LINK FT
You can access the FollowMyHealth Patient Portal offered by [facility] by registering at the following website: [email or web address]. By joining [employer]’s FollowMyHealth portal, you will also be able to view your health information using other applications (apps) compatible with our system.

## 2022-11-05 NOTE — ED PROVIDER NOTE - NSFOLLOWUPINSTRUCTIONS_ED_ALL_ED_FT
You were diagnosed with a urinary tract infection and prescribed antibiotics.  The rest of your lab work was unremarkable.  If you have worsening symptoms such as increasing lethargy, abdominal or back pain, vomiting, fever, inability to urinate please return to the emergency department immediately.

## 2022-11-06 LAB
CULTURE RESULTS: SIGNIFICANT CHANGE UP
SPECIMEN SOURCE: SIGNIFICANT CHANGE UP

## 2022-12-23 ENCOUNTER — EMERGENCY (EMERGENCY)
Facility: HOSPITAL | Age: 82
LOS: 1 days | Discharge: HOME | End: 2022-12-23
Attending: STUDENT IN AN ORGANIZED HEALTH CARE EDUCATION/TRAINING PROGRAM | Admitting: STUDENT IN AN ORGANIZED HEALTH CARE EDUCATION/TRAINING PROGRAM

## 2022-12-23 VITALS
OXYGEN SATURATION: 100 % | SYSTOLIC BLOOD PRESSURE: 120 MMHG | RESPIRATION RATE: 17 BRPM | DIASTOLIC BLOOD PRESSURE: 64 MMHG | HEART RATE: 88 BPM

## 2022-12-23 VITALS
OXYGEN SATURATION: 100 % | HEART RATE: 84 BPM | SYSTOLIC BLOOD PRESSURE: 145 MMHG | HEIGHT: 62 IN | RESPIRATION RATE: 16 BRPM | DIASTOLIC BLOOD PRESSURE: 61 MMHG

## 2022-12-23 DIAGNOSIS — Z98.890 OTHER SPECIFIED POSTPROCEDURAL STATES: Chronic | ICD-10-CM

## 2022-12-23 LAB
ALBUMIN SERPL ELPH-MCNC: 3.5 G/DL — SIGNIFICANT CHANGE UP (ref 3.3–5)
ALP SERPL-CCNC: 111 U/L — SIGNIFICANT CHANGE UP (ref 40–120)
ALT FLD-CCNC: 13 U/L — SIGNIFICANT CHANGE UP (ref 4–33)
ANION GAP SERPL CALC-SCNC: 9 MMOL/L — SIGNIFICANT CHANGE UP (ref 7–14)
APPEARANCE UR: CLEAR — SIGNIFICANT CHANGE UP
AST SERPL-CCNC: 23 U/L — SIGNIFICANT CHANGE UP (ref 4–32)
BACTERIA # UR AUTO: NEGATIVE — SIGNIFICANT CHANGE UP
BASOPHILS # BLD AUTO: 0.05 K/UL — SIGNIFICANT CHANGE UP (ref 0–0.2)
BASOPHILS NFR BLD AUTO: 0.4 % — SIGNIFICANT CHANGE UP (ref 0–2)
BILIRUB SERPL-MCNC: 0.4 MG/DL — SIGNIFICANT CHANGE UP (ref 0.2–1.2)
BILIRUB UR-MCNC: NEGATIVE — SIGNIFICANT CHANGE UP
BUN SERPL-MCNC: 10 MG/DL — SIGNIFICANT CHANGE UP (ref 7–23)
CALCIUM SERPL-MCNC: 9.6 MG/DL — SIGNIFICANT CHANGE UP (ref 8.4–10.5)
CHLORIDE SERPL-SCNC: 102 MMOL/L — SIGNIFICANT CHANGE UP (ref 98–107)
CO2 SERPL-SCNC: 28 MMOL/L — SIGNIFICANT CHANGE UP (ref 22–31)
COLOR SPEC: SIGNIFICANT CHANGE UP
CREAT SERPL-MCNC: 0.67 MG/DL — SIGNIFICANT CHANGE UP (ref 0.5–1.3)
DIFF PNL FLD: NEGATIVE — SIGNIFICANT CHANGE UP
EGFR: 87 ML/MIN/1.73M2 — SIGNIFICANT CHANGE UP
EOSINOPHIL # BLD AUTO: 0.36 K/UL — SIGNIFICANT CHANGE UP (ref 0–0.5)
EOSINOPHIL NFR BLD AUTO: 2.9 % — SIGNIFICANT CHANGE UP (ref 0–6)
EPI CELLS # UR: 3 /HPF — SIGNIFICANT CHANGE UP (ref 0–5)
FLUAV AG NPH QL: SIGNIFICANT CHANGE UP
FLUBV AG NPH QL: SIGNIFICANT CHANGE UP
GLUCOSE SERPL-MCNC: 96 MG/DL — SIGNIFICANT CHANGE UP (ref 70–99)
GLUCOSE UR QL: NEGATIVE — SIGNIFICANT CHANGE UP
HCT VFR BLD CALC: 36.2 % — SIGNIFICANT CHANGE UP (ref 34.5–45)
HGB BLD-MCNC: 12.1 G/DL — SIGNIFICANT CHANGE UP (ref 11.5–15.5)
HYALINE CASTS # UR AUTO: 1 /LPF — SIGNIFICANT CHANGE UP (ref 0–7)
IANC: 8.45 K/UL — HIGH (ref 1.8–7.4)
IMM GRANULOCYTES NFR BLD AUTO: 0.3 % — SIGNIFICANT CHANGE UP (ref 0–0.9)
KETONES UR-MCNC: NEGATIVE — SIGNIFICANT CHANGE UP
LEUKOCYTE ESTERASE UR-ACNC: ABNORMAL
LYMPHOCYTES # BLD AUTO: 19.2 % — SIGNIFICANT CHANGE UP (ref 13–44)
LYMPHOCYTES # BLD AUTO: 2.36 K/UL — SIGNIFICANT CHANGE UP (ref 1–3.3)
MCHC RBC-ENTMCNC: 30.2 PG — SIGNIFICANT CHANGE UP (ref 27–34)
MCHC RBC-ENTMCNC: 33.4 GM/DL — SIGNIFICANT CHANGE UP (ref 32–36)
MCV RBC AUTO: 90.3 FL — SIGNIFICANT CHANGE UP (ref 80–100)
MONOCYTES # BLD AUTO: 1.03 K/UL — HIGH (ref 0–0.9)
MONOCYTES NFR BLD AUTO: 8.4 % — SIGNIFICANT CHANGE UP (ref 2–14)
NEUTROPHILS # BLD AUTO: 8.45 K/UL — HIGH (ref 1.8–7.4)
NEUTROPHILS NFR BLD AUTO: 68.8 % — SIGNIFICANT CHANGE UP (ref 43–77)
NITRITE UR-MCNC: NEGATIVE — SIGNIFICANT CHANGE UP
NRBC # BLD: 0 /100 WBCS — SIGNIFICANT CHANGE UP (ref 0–0)
NRBC # FLD: 0 K/UL — SIGNIFICANT CHANGE UP (ref 0–0)
PH UR: 6.5 — SIGNIFICANT CHANGE UP (ref 5–8)
PLATELET # BLD AUTO: 163 K/UL — SIGNIFICANT CHANGE UP (ref 150–400)
POTASSIUM SERPL-MCNC: 4.1 MMOL/L — SIGNIFICANT CHANGE UP (ref 3.5–5.3)
POTASSIUM SERPL-SCNC: 4.1 MMOL/L — SIGNIFICANT CHANGE UP (ref 3.5–5.3)
PROT SERPL-MCNC: 7.2 G/DL — SIGNIFICANT CHANGE UP (ref 6–8.3)
PROT UR-MCNC: ABNORMAL
RBC # BLD: 4.01 M/UL — SIGNIFICANT CHANGE UP (ref 3.8–5.2)
RBC # FLD: 14.9 % — HIGH (ref 10.3–14.5)
RBC CASTS # UR COMP ASSIST: 1 /HPF — SIGNIFICANT CHANGE UP (ref 0–4)
RSV RNA NPH QL NAA+NON-PROBE: SIGNIFICANT CHANGE UP
SARS-COV-2 RNA SPEC QL NAA+PROBE: SIGNIFICANT CHANGE UP
SODIUM SERPL-SCNC: 139 MMOL/L — SIGNIFICANT CHANGE UP (ref 135–145)
SP GR SPEC: 1.01 — SIGNIFICANT CHANGE UP (ref 1.01–1.05)
UROBILINOGEN FLD QL: SIGNIFICANT CHANGE UP
WBC # BLD: 12.29 K/UL — HIGH (ref 3.8–10.5)
WBC # FLD AUTO: 12.29 K/UL — HIGH (ref 3.8–10.5)
WBC UR QL: 3 /HPF — SIGNIFICANT CHANGE UP (ref 0–5)

## 2022-12-23 PROCEDURE — 70450 CT HEAD/BRAIN W/O DYE: CPT | Mod: 26,MA

## 2022-12-23 PROCEDURE — 71046 X-RAY EXAM CHEST 2 VIEWS: CPT | Mod: 26

## 2022-12-23 PROCEDURE — 99284 EMERGENCY DEPT VISIT MOD MDM: CPT | Mod: GC

## 2022-12-23 RX ORDER — CEFTRIAXONE 500 MG/1
1000 INJECTION, POWDER, FOR SOLUTION INTRAMUSCULAR; INTRAVENOUS ONCE
Refills: 0 | Status: DISCONTINUED | OUTPATIENT
Start: 2022-12-23 | End: 2022-12-23

## 2022-12-23 RX ORDER — CEFPODOXIME PROXETIL 100 MG
200 TABLET ORAL ONCE
Refills: 0 | Status: COMPLETED | OUTPATIENT
Start: 2022-12-23 | End: 2022-12-23

## 2022-12-23 RX ORDER — CEFDINIR 250 MG/5ML
1 POWDER, FOR SUSPENSION ORAL
Qty: 20 | Refills: 0
Start: 2022-12-23 | End: 2023-01-01

## 2022-12-23 RX ADMIN — Medication 200 MILLIGRAM(S): at 19:45

## 2022-12-23 NOTE — ED PROVIDER NOTE - ATTENDING CONTRIBUTION TO CARE
Jomar Bird DO:  patient seen and evaluated with the resident.  I was present for key portions of the History & Physical, and I agree with the Impression & Plan. 81 yo f pmh  HTN, CVA, pw AMS.  Presents with daughter bedside at bedside, who provides collateral.  She is also caretaker for patient.  States has had 2 days of decreased mentation, decreased interactivity, low-grade fever.  States that when this happens he usually has a UTI.  Denies falls, cough, sick contacts.  Patient arrives HDS, well-appearing, moving all extremities.  No evidence of gross trauma.  We will do labs, imaging, CT, reassess.

## 2022-12-23 NOTE — ED PROVIDER NOTE - PHYSICAL EXAMINATION
Physical Exam:  Gen: NAD, AOx3, non-toxic appearing  Head: NCAT  HEENT: EOMI, PEERLA, normal conjunctiva, tongue midline, oral mucosa moist  Lung: CTAB, no respiratory distress, no wheezes/rhonchi/rales B/L, speaking in full sentences  CV: RRR, no murmurs, rubs or gallops  Abd: soft, NT, ND, no guarding, no rigidity, no rebound tenderness, no CVA tenderness   MSK: no visible deformities, ROM normal in UE/LE, no back pain  Neuro: No focal sensory or motor deficits  Skin: Warm, well perfused, no rash, no leg swelling  Psych: normal affect, calm

## 2022-12-23 NOTE — ED PROVIDER NOTE - NS ED ROS FT
CONSTITUTIONAL: +fevers, no chills, no lightheadedness, no dizziness  EYES: no visual changes, no eye pain  EARS: no ear drainage, no ear pain, no change in hearing  NOSE: no nasal congestion  MOUTH/THROAT: no sore throat  CV: No chest pain, no palpitations  RESP: No SOB, no cough  GI: No n/v/d, no abd pain  : no dysuria, no hematuria, no flank pain  MSK: no back pain, no extremity pain  SKIN: no rashes  NEURO: no headache, no focal weakness, no decreased sensation/parasthesias   PSYCHIATRIC: no known mental health issues

## 2022-12-23 NOTE — ED ADULT TRIAGE NOTE - CHIEF COMPLAINT QUOTE
brought in by daughter stating pt has been feeling warm since yesterday, increased lethargy, and Primfit urine collection at home was dry since last night. Daughter states pt was screaming for no reason during night. Daughter states this has happened before when she had UTIs. Hx dementia, UTIs

## 2022-12-23 NOTE — ED PROVIDER NOTE - DATE/TIME 1
23-Dec-2022 18:51 Consent (Near Eyelid Margin)/Introductory Paragraph: The rationale for Mohs was explained to the patient and consent was obtained. The risks, benefits and alternatives to therapy were discussed in detail. Specifically, the risks of ectropion or eyelid deformity, infection, scarring, bleeding, prolonged wound healing, incomplete removal, allergy to anesthesia, nerve injury and recurrence were addressed. Prior to the procedure, the treatment site was clearly identified and confirmed by the patient. All components of Universal Protocol/PAUSE Rule completed.

## 2022-12-23 NOTE — ED ADULT NURSE NOTE - OBJECTIVE STATEMENT
82 year old female received to rm 12 AAOx1 (self, unable to give ),  verified with family member. Pt ambulatory with walker. Per pt's daughter, pt brought in for fever, increased confusion and decreased urine output since last night. Respirations even and unlabored. PIV #20 right AC, labs drawn and sent. VS as noted. EKG in chart. Bed in lowest position, safety maintained

## 2022-12-23 NOTE — ED PROVIDER NOTE - NSFOLLOWUPINSTRUCTIONS_ED_ALL_ED_FT
You were found to have a Urinary Tract Infection.    Antibiotics were sent to your pharmacy. Please take as prescribed.    Follow up with your PCP within 2-3 days.    Return to ED if no improvement in 3 days, develop worsening mental status, or any new concerning symptoms.

## 2022-12-23 NOTE — ED PROVIDER NOTE - PATIENT PORTAL LINK FT
You can access the FollowMyHealth Patient Portal offered by [facility] by registering at the following website: [email or web address]. By joining [employer]’s FollowMyHealth portal, you will also be able to view your health information using other applications (apps) compatible with our system.

## 2022-12-23 NOTE — ED PROVIDER NOTE - OBJECTIVE STATEMENT
81yo F, PMHx HTN, CVA, dementia, UTIs (last one month ago) non verbal who presents to the ED with aide for concerns of UTI. Per aide, for the past two days, patient has been eating less, had tactile fevers, and sleepier than usual. Also has been screaming more at night and placing hands down diaper. Usually experiences similar symptoms with UTIs in the past. Denies hematuria, vomiting or diarrhea.

## 2022-12-23 NOTE — ED PROVIDER NOTE - PROGRESS NOTE DETAILS
Ronit PGY1: Patient reassessed. Family expresses desire for patient to go home as "she does not do well in the hospital and can receive antibiotics at home". Had a lengthy conversation regarding the benefit of admission but family denied. Patient found to have UTI. Will treat and send  prescription with return precautions Ronit PGY1: Patient reassessed. Family expresses desire for patient to go home as "she does not do well in the hospital and can receive antibiotics at home". Had a lengthy conversation regarding the benefit of admission but family denied. Patient found to have UTI. Will treat and send prescription with return precautions

## 2022-12-23 NOTE — ED PROVIDER NOTE - CLINICAL SUMMARY MEDICAL DECISION MAKING FREE TEXT BOX
Will do labs, imaging, CT, reassess. 83yo F, PMHx HTN, CVA, dementia, UTIs (last one month ago) non verbal who presents to the ED with aide for concerns of UTI. Symptoms similar to prior UTI. Physical exam and vitals wnl. Will do labs, CT, reassess.

## 2023-01-17 ENCOUNTER — EMERGENCY (EMERGENCY)
Facility: HOSPITAL | Age: 83
LOS: 1 days | Discharge: HOME | End: 2023-01-17
Attending: STUDENT IN AN ORGANIZED HEALTH CARE EDUCATION/TRAINING PROGRAM | Admitting: EMERGENCY MEDICINE
Payer: MEDICARE

## 2023-01-17 VITALS
SYSTOLIC BLOOD PRESSURE: 150 MMHG | OXYGEN SATURATION: 95 % | RESPIRATION RATE: 18 BRPM | HEIGHT: 62 IN | HEART RATE: 79 BPM | DIASTOLIC BLOOD PRESSURE: 74 MMHG | TEMPERATURE: 99 F

## 2023-01-17 DIAGNOSIS — Z98.890 OTHER SPECIFIED POSTPROCEDURAL STATES: Chronic | ICD-10-CM

## 2023-01-17 PROCEDURE — 99284 EMERGENCY DEPT VISIT MOD MDM: CPT | Mod: GC

## 2023-01-17 NOTE — ED ADULT TRIAGE NOTE - CHIEF COMPLAINT QUOTE
Pt brought in by family to r/o for UTI. Family notices that patient is warmer than usual, grinding her teeth and grimacing today which is consistent with her UTI symptoms. Took tylenol at 5PM today. Pmhx of chronic UTI, CVA, dementia. A&Ox1 currently at baseline.

## 2023-01-17 NOTE — ED ADULT TRIAGE NOTE - HEART RATE (BEATS/MIN)
Detail Level: Detailed Add 19267 Cpt? (Important Note: In 2017 The Use Of 40709 Is Being Tracked By Cms To Determine Future Global Period Reimbursement For Global Periods): yes 79

## 2023-01-18 VITALS
SYSTOLIC BLOOD PRESSURE: 152 MMHG | RESPIRATION RATE: 20 BRPM | HEART RATE: 88 BPM | OXYGEN SATURATION: 95 % | DIASTOLIC BLOOD PRESSURE: 76 MMHG | TEMPERATURE: 99 F

## 2023-01-18 LAB
ALBUMIN SERPL ELPH-MCNC: 4 G/DL — SIGNIFICANT CHANGE UP (ref 3.3–5)
ALP SERPL-CCNC: 123 U/L — HIGH (ref 40–120)
ALT FLD-CCNC: 13 U/L — SIGNIFICANT CHANGE UP (ref 4–33)
ANION GAP SERPL CALC-SCNC: 9 MMOL/L — SIGNIFICANT CHANGE UP (ref 7–14)
APPEARANCE UR: ABNORMAL
APTT BLD: 26.7 SEC — LOW (ref 27–36.3)
AST SERPL-CCNC: 19 U/L — SIGNIFICANT CHANGE UP (ref 4–32)
BASOPHILS # BLD AUTO: 0.02 K/UL — SIGNIFICANT CHANGE UP (ref 0–0.2)
BASOPHILS NFR BLD AUTO: 0.3 % — SIGNIFICANT CHANGE UP (ref 0–2)
BILIRUB SERPL-MCNC: 0.3 MG/DL — SIGNIFICANT CHANGE UP (ref 0.2–1.2)
BILIRUB UR-MCNC: NEGATIVE — SIGNIFICANT CHANGE UP
BLOOD GAS VENOUS COMPREHENSIVE RESULT: SIGNIFICANT CHANGE UP
BUN SERPL-MCNC: 11 MG/DL — SIGNIFICANT CHANGE UP (ref 7–23)
CALCIUM SERPL-MCNC: 9.7 MG/DL — SIGNIFICANT CHANGE UP (ref 8.4–10.5)
CHLORIDE SERPL-SCNC: 103 MMOL/L — SIGNIFICANT CHANGE UP (ref 98–107)
CO2 SERPL-SCNC: 27 MMOL/L — SIGNIFICANT CHANGE UP (ref 22–31)
COLOR SPEC: SIGNIFICANT CHANGE UP
CREAT SERPL-MCNC: 0.59 MG/DL — SIGNIFICANT CHANGE UP (ref 0.5–1.3)
DIFF PNL FLD: ABNORMAL
EGFR: 90 ML/MIN/1.73M2 — SIGNIFICANT CHANGE UP
EOSINOPHIL # BLD AUTO: 0.14 K/UL — SIGNIFICANT CHANGE UP (ref 0–0.5)
EOSINOPHIL NFR BLD AUTO: 1.8 % — SIGNIFICANT CHANGE UP (ref 0–6)
FLUAV AG NPH QL: SIGNIFICANT CHANGE UP
FLUBV AG NPH QL: SIGNIFICANT CHANGE UP
GLUCOSE SERPL-MCNC: 95 MG/DL — SIGNIFICANT CHANGE UP (ref 70–99)
GLUCOSE UR QL: NEGATIVE — SIGNIFICANT CHANGE UP
HCT VFR BLD CALC: 36.8 % — SIGNIFICANT CHANGE UP (ref 34.5–45)
HGB BLD-MCNC: 12.1 G/DL — SIGNIFICANT CHANGE UP (ref 11.5–15.5)
IANC: 3.48 K/UL — SIGNIFICANT CHANGE UP (ref 1.8–7.4)
IMM GRANULOCYTES NFR BLD AUTO: 0.1 % — SIGNIFICANT CHANGE UP (ref 0–0.9)
INR BLD: 1.01 RATIO — SIGNIFICANT CHANGE UP (ref 0.88–1.16)
KETONES UR-MCNC: NEGATIVE — SIGNIFICANT CHANGE UP
LEUKOCYTE ESTERASE UR-ACNC: ABNORMAL
LYMPHOCYTES # BLD AUTO: 3.65 K/UL — HIGH (ref 1–3.3)
LYMPHOCYTES # BLD AUTO: 46.9 % — HIGH (ref 13–44)
MCHC RBC-ENTMCNC: 29.2 PG — SIGNIFICANT CHANGE UP (ref 27–34)
MCHC RBC-ENTMCNC: 32.9 GM/DL — SIGNIFICANT CHANGE UP (ref 32–36)
MCV RBC AUTO: 88.9 FL — SIGNIFICANT CHANGE UP (ref 80–100)
MONOCYTES # BLD AUTO: 0.48 K/UL — SIGNIFICANT CHANGE UP (ref 0–0.9)
MONOCYTES NFR BLD AUTO: 6.2 % — SIGNIFICANT CHANGE UP (ref 2–14)
NEUTROPHILS # BLD AUTO: 3.48 K/UL — SIGNIFICANT CHANGE UP (ref 1.8–7.4)
NEUTROPHILS NFR BLD AUTO: 44.7 % — SIGNIFICANT CHANGE UP (ref 43–77)
NITRITE UR-MCNC: NEGATIVE — SIGNIFICANT CHANGE UP
NRBC # BLD: 0 /100 WBCS — SIGNIFICANT CHANGE UP (ref 0–0)
NRBC # FLD: 0 K/UL — SIGNIFICANT CHANGE UP (ref 0–0)
PH UR: 6.5 — SIGNIFICANT CHANGE UP (ref 5–8)
PLATELET # BLD AUTO: 213 K/UL — SIGNIFICANT CHANGE UP (ref 150–400)
POTASSIUM SERPL-MCNC: 4.1 MMOL/L — SIGNIFICANT CHANGE UP (ref 3.5–5.3)
POTASSIUM SERPL-SCNC: 4.1 MMOL/L — SIGNIFICANT CHANGE UP (ref 3.5–5.3)
PROT SERPL-MCNC: 7.1 G/DL — SIGNIFICANT CHANGE UP (ref 6–8.3)
PROT UR-MCNC: NEGATIVE — SIGNIFICANT CHANGE UP
PROTHROM AB SERPL-ACNC: 11.7 SEC — SIGNIFICANT CHANGE UP (ref 10.5–13.4)
RBC # BLD: 4.14 M/UL — SIGNIFICANT CHANGE UP (ref 3.8–5.2)
RBC # FLD: 15.7 % — HIGH (ref 10.3–14.5)
RSV RNA NPH QL NAA+NON-PROBE: SIGNIFICANT CHANGE UP
SARS-COV-2 RNA SPEC QL NAA+PROBE: SIGNIFICANT CHANGE UP
SODIUM SERPL-SCNC: 139 MMOL/L — SIGNIFICANT CHANGE UP (ref 135–145)
SP GR SPEC: 1.01 — SIGNIFICANT CHANGE UP (ref 1.01–1.05)
UROBILINOGEN FLD QL: SIGNIFICANT CHANGE UP
WBC # BLD: 7.78 K/UL — SIGNIFICANT CHANGE UP (ref 3.8–10.5)
WBC # FLD AUTO: 7.78 K/UL — SIGNIFICANT CHANGE UP (ref 3.8–10.5)

## 2023-01-18 PROCEDURE — 71045 X-RAY EXAM CHEST 1 VIEW: CPT | Mod: 26

## 2023-01-18 RX ORDER — CEFPODOXIME PROXETIL 100 MG
1 TABLET ORAL
Qty: 10 | Refills: 0
Start: 2023-01-18 | End: 2023-01-22

## 2023-01-18 NOTE — ED PROVIDER NOTE - OBJECTIVE STATEMENT
82-year-old female history of chronic urinary tract infections, hypertension, CVA, dementia, recent urinary tract infection improvement with course of cefpodoxime, presenting with new onset grinding of teeth mild fever subjectively, Patient's daughter states patient has been having creasing teeth grinding mild confusion concern for recurrence of urinary tract infection, denies any shortness of breath observed, overt weakness, change in mental status.

## 2023-01-18 NOTE — ED PROVIDER NOTE - PATIENT PORTAL LINK FT
You can access the FollowMyHealth Patient Portal offered by [facility] by registering at the following website: [email or web address]. By joining [employer]’s FollowMyHealth portal, you will also be able to view your health information using other applications (apps) compatible with our system.

## 2023-01-18 NOTE — ED PROVIDER NOTE - PHYSICAL EXAMINATION
Physical Exam:  General: NAD, Conversive  Eyes: EOMI, Conjunctiva and sclera clear  Neck: No JVD  Lungs: Clear to auscultation bilaterally, no wheeze, no rhonchi  Heart: Normal S1, S2, no murmurs  Abdomen: Soft, nontender, nondistended, no CVA tenderness  Extremities: 2+ peripheral pulses, no edema  Psych: AAO X1

## 2023-01-18 NOTE — ED PROVIDER NOTE - ATTENDING CONTRIBUTION TO CARE
82F h/o dementia, recurrent UTIs, HTN, CVA, dementia p/w new onset grinding of teeth and subjective fever, which pt's daughter states is common when she is sick. Pt unable to provide history. No other changes noted by daughter  Gen: grinding teeth, AOx1  CV: rrr, no appreciable murmur  Pulm: clear lungs  Abd: soft, ntnd  Ext: no edema/swelling  Skin: no rash  MDM: 82F h/o dementia, recurrent UTIs, HTN, CVA, dementia p/w new onset grinding of teeth and subjective fever which daughter was concerned for new infection - will get infectious workup with cbc, cmp, UA/UC, CXR, viral panel. Pt non-toxic appearing and without SIRS criteria. Can await results prior to emperic abx.

## 2023-01-18 NOTE — ED PROVIDER NOTE - NSFOLLOWUPINSTRUCTIONS_ED_ALL_ED_FT
Urinary Tract Infection    A urinary tract infection (UTI) is an infection of any part of the urinary tract, which includes the kidneys, ureters, bladder, and urethra. Risk factors include ignoring your need to urinate, wiping back to front if female, being an uncircumcised male, and having diabetes or a weak immune system. Symptoms include frequent urination, pain or burning with urination, foul smelling urine, cloudy urine, pain in the lower abdomen, blood in the urine, and fever. If you were prescribed an antibiotic medicine, take it as told by your health care provider. Do not stop taking the antibiotic even if you start to feel better.    Take cefpodoxime 200 mg BID for urinary tract infection, complete the course of antibiotics once started.    SEEK IMMEDIATE MEDICAL CARE IF YOU HAVE ANY OF THE FOLLOWING SYMPTOMS: severe back or abdominal pain, fever, inability to keep fluids or medicine down, dizziness/lightheadedness, or a change in mental status.

## 2023-01-18 NOTE — ED PROVIDER NOTE - PROGRESS NOTE DETAILS
Alex Esquivel MD:  Bw nonconcerning for overt infectious etiology, urinalysis questionable, shared decision making engaged, patient had improved MS after last course of antibiotics with similar presentation, will DC with antibiotics, primary followup.

## 2023-01-18 NOTE — ED ADULT NURSE NOTE - NSIMPLEMENTINTERV_GEN_ALL_ED
Implemented All Fall Risk Interventions:  [place] to call system. Call bell, personal items and telephone within reach. Instruct patient to call for assistance. Room bathroom lighting operational. Non-slip footwear when patient is off stretcher. Physically safe environment: no spills, clutter or unnecessary equipment. Stretcher in lowest position, wheels locked, appropriate side rails in place. Provide visual cue, wrist band, yellow gown, etc. Monitor gait and stability. Monitor for mental status changes and reorient to person, place, and time. Review medications for side effects contributing to fall risk. Reinforce activity limits and safety measures with patient and family.

## 2023-01-18 NOTE — ED PROVIDER NOTE - CLINICAL SUMMARY MEDICAL DECISION MAKING FREE TEXT BOX
82-year-old female presenting with mild change in cognition, grinding of teeth, symptoms consistent with prior episodes of urinary tract infection, per patient's daughter subjective fever at home.  Primary concern for rule out infectious etiology, urosepsis, overt inflammatory versus infectious etiology.  Will reevaluate with basic blood work urinalysis urine culture blood cultures, review documentation from prior Emergency Department visits, and lab results from prior visit hospital.

## 2023-01-18 NOTE — ED ADULT NURSE NOTE - OBJECTIVE STATEMENT
Pt received in room 5. Pt A&Ox1(name), Pmhx of Dementia, CVA, Chronic UTI's brought to ED by daughter for possible UTI. Per daughter pt has felt warm recently and began to grind her teeth which is common when she has a UTI. Daughter denies pt c/o any chest pain, sob, abd pain, n/v/d, fevers/chills. Daughter refused straight cath of patient, MD aware, pt placed on primafit. Respirations even and unlabored, no accessory muscle use. Placed on cardiac monitor. Afebrile rectally, 99.2. USIV placed by MD. Stretcher in lowest position, side rail up, call bell within reach.

## 2023-01-19 LAB
CULTURE RESULTS: SIGNIFICANT CHANGE UP
SPECIMEN SOURCE: SIGNIFICANT CHANGE UP

## 2023-01-20 NOTE — ED POST DISCHARGE NOTE - DETAILS
[phone] - Pt daughter contacted and discussed UCX result.  Patient is feeling better with abx.  Recommended to continue taking abx and follow up with PMD for repeat UA/UCX.

## 2023-01-20 NOTE — ED POST DISCHARGE NOTE - RESULT SUMMARY
UCX: Culture grew 3 or more types of organisms which indicate collection contamination; consider recollection only if clinically indicated.  Pt was discharged on cefpodoxime.

## 2023-03-06 ENCOUNTER — APPOINTMENT (OUTPATIENT)
Dept: GERIATRICS | Facility: CLINIC | Age: 83
End: 2023-03-06
Payer: MEDICARE

## 2023-03-06 VITALS
WEIGHT: 97 LBS | SYSTOLIC BLOOD PRESSURE: 151 MMHG | TEMPERATURE: 97.4 F | HEART RATE: 82 BPM | HEIGHT: 64 IN | BODY MASS INDEX: 16.56 KG/M2 | OXYGEN SATURATION: 99 % | DIASTOLIC BLOOD PRESSURE: 74 MMHG

## 2023-03-06 DIAGNOSIS — R03.0 ELEVATED BLOOD-PRESSURE READING, W/OUT DIAGNOSIS OF HYPERTENSION: ICD-10-CM

## 2023-03-06 DIAGNOSIS — M17.10 UNILATERAL PRIMARY OSTEOARTHRITIS, UNSPECIFIED KNEE: ICD-10-CM

## 2023-03-06 PROCEDURE — 99205 OFFICE O/P NEW HI 60 MIN: CPT

## 2023-03-06 RX ORDER — MULTIVIT-MIN/FOLIC/VIT K/LYCOP 400-300MCG
1000 TABLET ORAL DAILY
Qty: 90 | Refills: 3 | Status: ACTIVE | COMMUNITY
Start: 2023-03-06

## 2023-03-06 RX ORDER — SENNOSIDES 50; 8.6 MG/1; MG/1
200 TABLET ORAL
Refills: 0 | Status: ACTIVE | COMMUNITY
Start: 2023-03-06

## 2023-03-06 RX ORDER — ACETAMINOPHEN 650 MG/1
650 TABLET, EXTENDED RELEASE ORAL
Refills: 0 | Status: ACTIVE | COMMUNITY
Start: 2023-03-06

## 2023-03-06 RX ORDER — IBUPROFEN 100 MG/5ML
6.5 SUSPENSION, ORAL (FINAL DOSE FORM) ORAL
Qty: 1 | Refills: 0 | Status: ACTIVE | COMMUNITY
Start: 2023-03-06 | End: 1900-01-01

## 2023-03-06 RX ORDER — CHROMIUM 200 MCG
25 MCG TABLET ORAL DAILY
Refills: 0 | Status: ACTIVE | COMMUNITY
Start: 2023-03-06

## 2023-03-06 NOTE — DATA REVIEWED
[FreeTextEntry1] : CT BRAIN 12/2022: \par IMPRESSION:\par \par 1. Ischemic white matter disease upper range typical for age\par \par 2. Diffuse brain volume loss greater than typical for age\par \par 3. Patient limited incomplete scan\par \par --- End of Report ---

## 2023-03-06 NOTE — END OF VISIT
[Time Spent: ___ minutes] : I have spent [unfilled] minutes of time on the encounter. Detail Level: Simple Additional Notes: Patient consent was obtained to proceed with the visit and recommended plan of care after discussion of all risks and benefits, including the risks of COVID-19 exposure. Detail Level: Zone Render Risk Assessment In Note?: no Additional Notes: Will restart at Cosentyx maintenance dose since patient has not been off medication very long and psoriasis is not significantly flared at this time.

## 2023-03-06 NOTE — HISTORY OF PRESENT ILLNESS
[Full assistance needed] : Assistance needed managing medications [] : Assistance needed managing finances. [No falls in past year] : Patient reported no falls in the past year [FAST Score: ____] : Functional Assessment Scale (FAST) Score: [unfilled] [Walker] : walker [Wheelchair] : wheelchair [Hospital Bed] : hospital bed [Shower Chair] : shower chair [Medical reason not done] : Medical reason not done [With Patient/Caregiver] : , with patient/caregiver [I will adhere to the patient's wishes.] : I will adhere to the patient's wishes. [FreeTextEntry1] : Mrs. Dunaway is an 83yo female presents for initial office visit to establish care. PMHX: CVA without sig residual deficits, advanced dementia mixed Alz/vascular dx in 7265-8777, HTN no longer on antihypertensives, recurrent UTI. 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 \par 3 children: Alma Rosa (youngest) Emmanuel (Lives in [place]), Ann Marie ([place])\par  in 2015, was primary caregiver for her  \par after her  passed, she moved in with Alma Rosa  \par One floor apartment, own room\par has hospital bed \par shower chair \par ambulates with walker but mostly sits in chair \par Mobility/functional status: ambulates with walker \par Mind/mentation: no tearfulness, agitation, sleep disturbances reported\par Medication: HHA and dtr manage and assists\par \par #Advanced dementia:\par FAST 7A \par dx in 6689-7359 with progression in 2014 after he  passed\par used to attend senior center, last visit was roughly 2021 \par Has 24/7 live-in aids through medicaid benefit \par Dependent in all ADLS except is able to feed herself \par dependentl in all IADLs \par DME: hospital bed since recent hospitalization, shower chair, walker, transport wheelchair \par was on fluphenzine/benztropine for behavioral disturbances but this was discontinued since prior hospitalization\par behaviors have been stable, no sig sx reported \par pt appears calm and pleasant during encounter \par sleeping well\par eats three full meals per day\par underweight, BMI 16, has been losing weight x past 1yr \par \par HTN:\par  was previously on 3 agents but all were discontinued during hospitalization in Dec 2022 \par \par Abscess:\par March 2022 hospitalized for thigh abscess, tx with IVABX, resolved\par no recurrent wounds since then\par has air-cushion on bed which helps \par \par Recurrent UTI:\par today this is dtrs biggest concern\par s/s include changes in patient behavior, +irritable/agitation/restless\par sometimes +fever, hold lower abdomen, placed hand in diaper \par multiple ED visits for UTI, responds well to ABX therapy \par no s/s of UTI recently, dtr states patient is at baseilne behavios and mentation \par \par LABS reviewed from [facility]:\par CBC hb/hct 12/36 \par CMP unremarkable \par \par SH:\par smoking/alcohol history: smoked for about 5-8 yrs in her 20's, quit after that; no alcohol \par work: retired LPN\par SDOH: denies\par \par HCM: reports all prior screening were completed and reportedly all wnl, no follow-ups advised \par \par Vaccinations: need to verify at next appointment\par Flu:\par Covid:\par PNA:\par Shingles:\par Tdap:\par \par ACP: discussed as below \par HCP: reports daughter Alma Rosa was appointed, paperwork at home \par MOLST: unsure if MOLST was completed but did complete documentation with , will send over to our office \par  [FreeTextEntry8] : uses [employer] at home [de-identified] : pt unable to participate [AdvancecareDate] : 3/6/2023 [FreeTextEntry4] : HCP discussed, Completed prior with ; Alma Rosa states she is primary. will send over documentation to our office; will also look for documentation of DNI/DNR, believes it was completed in past with . Has had conversations prior when patient's cog function was intact, always stated she would not want to be intubated or resuscitated

## 2023-03-06 NOTE — PHYSICAL EXAM
[Alert] : alert [No Acute Distress] : in no acute distress [Sclera] : the sclera and conjunctiva were normal [Normal Oral Mucosa] : normal oral mucosa [No Oral Pallor] : no oral pallor [No Oral Cyanosis] : no oral cyanosis [Normal Outer Ear/Nose] : the ears and nose were normal in appearance [Normal Lips/Gums] : the lips and gums were normal [Normal Appearance] : the appearance of the neck was normal [Thyroid Not Enlarged] : the thyroid was not enlarged [No Respiratory Distress] : no respiratory distress [No Acc Muscle Use] : no accessory muscle use [Respiration, Rhythm And Depth] : normal respiratory rhythm and effort [Auscultation Breath Sounds / Voice Sounds] : lungs were clear to auscultation bilaterally [Normal S1, S2] : normal S1 and S2 [Heart Rate And Rhythm] : heart rate was normal and rhythm regular [Edema] : edema was not present [Bowel Sounds] : normal bowel sounds [Abdomen Tenderness] : non-tender [No CVA Tenderness] : no CVA  tenderness [Abdomen Soft] : soft [No Spinal Tenderness] : no spinal tenderness [No Clubbing, Cyanosis] : no clubbing or cyanosis of the fingernails [Involuntary Movements] : no involuntary movements were seen [Motor Tone] : muscle strength and tone were normal [Normal Color / Pigmentation] : normal skin color and pigmentation [] : no rash [Skin Lesions] : no skin lesions [Normal Affect] : the affect was normal [Normal Insight/Judgment] : insight and judgment were not intact [Normal Mood] : the mood was normal [de-identified] : sitting in wheelchair, does not engage in conversation, speaks 1-2 words during encounter. very pleasant. Thin.  [FreeTextEntry1] : no erythema; scant dried discharge from left eye - non-purulent. no conjunctival injection [de-identified] : bilateral cerumen impaction [de-identified] : no obvious FND; pt unable to participate in full neurologic examination

## 2023-03-06 NOTE — REVIEW OF SYSTEMS
[Recent Weight Loss (___ Lbs)] : recent [unfilled] ~Ulb weight loss [Incontinence] : incontinence [Fever] : no fever [Chills] : no chills [Feeling Poorly] : not feeling poorly [Feeling Tired] : not feeling tired [Loss Of Hearing] : no hearing loss [Nasal Discharge] : no nasal discharge [Lower Ext Edema] : no lower extremity edema [SOB on Exertion] : no shortness of breath during exertion [Abdominal Pain] : no abdominal pain [Vomiting] : no vomiting [Constipation] : no constipation [Diarrhea] : no diarrhea [Melena] : no melena [Dysuria] : no dysuria [Vaginal Discharge] : no vaginal discharge [Abn Vaginal Bleeding] : no unexplained vaginal bleeding [Skin Lesions] : no skin lesions [Skin Wound] : no skin wound [Itching] : no itching [Dizziness] : no dizziness [Fainting] : no fainting [Anxiety] : no anxiety [Depression] : no depression [Change In Personality] : no personality change [Easy Bleeding] : no tendency for easy bleeding [Easy Bruising] : no tendency for easy bruising [Negative] : Musculoskeletal [FreeTextEntry6] : intermittent cough, non-productive

## 2023-04-20 ENCOUNTER — EMERGENCY (EMERGENCY)
Facility: HOSPITAL | Age: 83
LOS: 1 days | Discharge: HOME | End: 2023-04-20
Attending: EMERGENCY MEDICINE | Admitting: EMERGENCY MEDICINE
Payer: MEDICARE

## 2023-04-20 VITALS
TEMPERATURE: 98 F | DIASTOLIC BLOOD PRESSURE: 81 MMHG | OXYGEN SATURATION: 100 % | RESPIRATION RATE: 16 BRPM | SYSTOLIC BLOOD PRESSURE: 162 MMHG

## 2023-04-20 VITALS
HEART RATE: 98 BPM | SYSTOLIC BLOOD PRESSURE: 159 MMHG | DIASTOLIC BLOOD PRESSURE: 91 MMHG | RESPIRATION RATE: 18 BRPM | OXYGEN SATURATION: 100 %

## 2023-04-20 DIAGNOSIS — Z98.890 OTHER SPECIFIED POSTPROCEDURAL STATES: Chronic | ICD-10-CM

## 2023-04-20 PROCEDURE — 93010 ELECTROCARDIOGRAM REPORT: CPT

## 2023-04-20 PROCEDURE — 12011 RPR F/E/E/N/L/M 2.5 CM/<: CPT

## 2023-04-20 PROCEDURE — 99285 EMERGENCY DEPT VISIT HI MDM: CPT | Mod: 25

## 2023-04-20 PROCEDURE — 72125 CT NECK SPINE W/O DYE: CPT | Mod: 26,MG

## 2023-04-20 PROCEDURE — G1004: CPT

## 2023-04-20 PROCEDURE — 70450 CT HEAD/BRAIN W/O DYE: CPT | Mod: 26,MG

## 2023-04-20 RX ORDER — TETANUS TOXOID, REDUCED DIPHTHERIA TOXOID AND ACELLULAR PERTUSSIS VACCINE, ADSORBED 5; 2.5; 8; 8; 2.5 [IU]/.5ML; [IU]/.5ML; UG/.5ML; UG/.5ML; UG/.5ML
0.5 SUSPENSION INTRAMUSCULAR ONCE
Refills: 0 | Status: COMPLETED | OUTPATIENT
Start: 2023-04-20 | End: 2023-04-20

## 2023-04-20 RX ADMIN — TETANUS TOXOID, REDUCED DIPHTHERIA TOXOID AND ACELLULAR PERTUSSIS VACCINE, ADSORBED 0.5 MILLILITER(S): 5; 2.5; 8; 8; 2.5 SUSPENSION INTRAMUSCULAR at 15:26

## 2023-04-20 NOTE — ED PROVIDER NOTE - PATIENT PORTAL LINK FT
You can access the FollowMyHealth Patient Portal offered by [facility] by registering at the following website: [email or web address]. By joining [employer]’s FollowMyHealth portal, you will also be able to view your health information using other applications (apps) compatible with our system.

## 2023-04-20 NOTE — ED PROVIDER NOTE - PROGRESS NOTE DETAILS
CT head and cervical spine are negative. Discussed return precautions. Pt is comfortable being DC home.

## 2023-04-20 NOTE — ED PROVIDER NOTE - OBJECTIVE STATEMENT
Patient is an 82-year-old female with past medical history of dementia (ANO x1 at baseline), hypertension, hyperlipidemia, CVA presents with fall and head injury 1.5 hours ago.  History entirely obtained from accompanying home health aide, Imer,  who states that 1.5 hours ago, patient slipped off a chair and struck frontal aspect of head on edge of table.  Home health aide reports no associated LOC.  Home health aide states no recent fevers, cough, vomiting, malodorous urine, falls, use of blood thinners.  She reports no change in patient's mental status. Patient is an 82-year-old female with past medical history of dementia (ANO x1 at baseline), hypertension, hyperlipidemia, CVA presents with fall and head injury 1.5 hours ago.  History entirely obtained from accompanying home health aide, Imer,  who states that 1.5 hours ago, patient slipped off a chair and struck frontal aspect of head on edge of table.  Home health aide reports no associated LOC.  Home health aide states no recent fevers, cough, vomiting, malodorous urine, falls, use of blood thinners.  She reports no change in patient's mental status.    Attending/Caio: 81 yo F as described above, s/p slip/fall with head trauma. As per HHA, no LOC, and patient is at baseline.

## 2023-04-20 NOTE — ED ADULT NURSE NOTE - OBJECTIVE STATEMENT
BREAK RN: pt. received to room 27 A&Ox1 (name) ambulatory with assist presenting s/p fall. HHA at bedside endorses patient tipping over off of a chair. Laceration noted on the forehead covered with gauze, bleeding controlled. confirmed via daughter no AC use, HHA confirms no LOC. NAD noted at this time. respirations even and unlabored on RA. ANTOINE Feliz and MD [place] at bedside for eval. comfort measures provided. safety precautions maintained.

## 2023-04-20 NOTE — ED PROVIDER NOTE - NSFOLLOWUPINSTRUCTIONS_ED_ALL_ED_FT
Advance activity as tolerated.  Continue all previously prescribed medications as directed unless otherwise instructed.  Follow up with your primary care physician in 48-72 hours- bring copies of your results.  Return to the ER for worsening or persistent symptoms, including but not limited to change in mental status, difficulty walking, falls, difficulty speaking, difficulty swallowing, redness/discharge/swelling at wound, and/or ANY NEW OR CONCERNING SYMPTOMS. If you have issues obtaining follow up, please call: [phone] to obtain a doctor or specialist who takes your insurance in your area.  You may call [phone] to make an appointment with the internal medicine clinic.

## 2023-04-20 NOTE — ED ADULT NURSE REASSESSMENT NOTE - NS ED NURSE REASSESS COMMENT FT1
AAOx0, no signs of distress, laceration bleeding controlled by gauze, pending scans, fall precautions maintained
AAOx0, no signs of distress, pending CT, fall precautions maintained
Report received from day RNAlison. Nonverbal noted, as per daughter at bedside, states this is baseline. Respirations even and unlabored. No acute distress noted. Pt appears comfortably laying in stretcher, HOB elevated. Gauze noted to laceration on forehead. No active bleed at this time. Awaiting CT results.

## 2023-04-20 NOTE — ED ADULT TRIAGE NOTE - CHIEF COMPLAINT QUOTE
PMHX dementia, A/O x 1, here with home aide. Patient had witnessed slip and fall out of chair and hit forehead on table. As per home aide, no LOC. 1/2 inch Laceration to mid forehead with hematoma noted.

## 2023-04-20 NOTE — ED PROVIDER NOTE - CLINICAL SUMMARY MEDICAL DECISION MAKING FREE TEXT BOX
Patient is an 82-year-old female with past medical history of dementia (ANO x1 at baseline), hypertension, hyperlipidemia, CVA presents with fall and head injury 1.5 hours ago.  History entirely obtained from accompanying home health aide, Imer,  who states that 1.5 hours ago, patient slipped off a chair and struck frontal aspect of head on edge of table.  Home health aide reports no associated LOC.  Home health aide states no recent fevers, cough, vomiting, malodorous urine, falls, use of blood thinners.  She reports no change in patient's mental status.  This is a patient with a forehead laceration, head injury.  Will order CT head, CT cervical spine, Adacel.  Disposition pending work-up.

## 2023-04-20 NOTE — ED PROVIDER NOTE - PHYSICAL EXAMINATION
Pt does not follow commands.    -Cranial Nerves:  --CN II: PERRLA  --CN III, IV, VI: EOMI b/l  --CN V1-3: Pt does not participate in sensory exam.  --CN VII: No facial asymmetry or droop  --CN VIII: Pt does not participate in hearing exam.  --CN IX, X: Palate elevates symmetrically. Normal phonation  --CN XI: Heading turning and shoulder shrug intact b/l  --CN XII: Tongue midline with normal movements Pt does not follow commands.    -Cranial Nerves:  --CN II: PERRLA  --CN III, IV, VI: EOMI b/l  --CN V1-3: Pt does not participate in sensory exam.  --CN VII: No facial asymmetry or droop  --CN VIII: Pt does not participate in hearing exam.  --CN IX, X: Palate elevates symmetrically. Normal phonation  --CN XI: Heading turning and shoulder shrug intact b/l  --CN XII: Tongue midline with normal movements    Attending/Caio: NAD; PERRL; non-icterus, no midline cspine PT, supple, no JVD, RRR, CTAB; Abd-soft, NT/ND, no HSM; no LE edema, A&Ox1, nonfocal; Skin-+forehead lac ~1cm

## 2023-04-20 NOTE — ED PROVIDER NOTE - EYES, MLM
Reviewed with Betty.    Planning surgery and planning to send adjuvant Oncotype (if applies).    BJT MG 3-4 weeks post-op (7/25 I believe surgery) w/ Oncotype.    Is there anyway to put an automated reminder early August for me to review path?    Hoang Franco MD.   Clear bilaterally, pupils equal, round and reactive to light.

## 2023-04-26 ENCOUNTER — APPOINTMENT (OUTPATIENT)
Dept: GERIATRICS | Facility: CLINIC | Age: 83
End: 2023-04-26

## 2023-05-08 ENCOUNTER — APPOINTMENT (OUTPATIENT)
Dept: GERIATRICS | Facility: CLINIC | Age: 83
End: 2023-05-08
Payer: MEDICARE

## 2023-05-08 VITALS
RESPIRATION RATE: 15 BRPM | WEIGHT: 94 LBS | HEART RATE: 4 BPM | BODY MASS INDEX: 16.14 KG/M2 | DIASTOLIC BLOOD PRESSURE: 77 MMHG | OXYGEN SATURATION: 97 % | SYSTOLIC BLOOD PRESSURE: 126 MMHG | TEMPERATURE: 98.1 F

## 2023-05-08 DIAGNOSIS — Z00.00 ENCOUNTER FOR GENERAL ADULT MEDICAL EXAMINATION W/OUT ABNORMAL FINDINGS: ICD-10-CM

## 2023-05-08 DIAGNOSIS — H61.23 IMPACTED CERUMEN, BILATERAL: ICD-10-CM

## 2023-05-08 PROCEDURE — 99497 ADVNCD CARE PLAN 30 MIN: CPT

## 2023-05-08 PROCEDURE — 99214 OFFICE O/P EST MOD 30 MIN: CPT

## 2023-05-08 RX ORDER — ETHYL ALCOHOL 700 MG/ML
GEL TOPICAL
Qty: 1 | Refills: 3 | Status: ACTIVE | COMMUNITY
Start: 2023-03-06 | End: 1900-01-01

## 2023-05-08 NOTE — PHYSICAL EXAM
[Alert] : alert [Sclera] : the sclera and conjunctiva were normal [Normal Oral Mucosa] : normal oral mucosa [No Oral Pallor] : no oral pallor [No Oral Cyanosis] : no oral cyanosis [Normal Outer Ear/Nose] : the ears and nose were normal in appearance [Normal Lips/Gums] : the lips and gums were normal [Normal Appearance] : the appearance of the neck was normal [Thyroid Not Enlarged] : the thyroid was not enlarged [No Respiratory Distress] : no respiratory distress [No Acc Muscle Use] : no accessory muscle use [Respiration, Rhythm And Depth] : normal respiratory rhythm and effort [Auscultation Breath Sounds / Voice Sounds] : lungs were clear to auscultation bilaterally [Normal S1, S2] : normal S1 and S2 [Heart Rate And Rhythm] : heart rate was normal and rhythm regular [Edema] : edema was not present [Bowel Sounds] : normal bowel sounds [Abdomen Tenderness] : non-tender [No CVA Tenderness] : no CVA  tenderness [Abdomen Soft] : soft [No Spinal Tenderness] : no spinal tenderness [No Clubbing, Cyanosis] : no clubbing or cyanosis of the fingernails [Involuntary Movements] : no involuntary movements were seen [Motor Tone] : muscle strength and tone were normal [Normal Color / Pigmentation] : normal skin color and pigmentation [] : no rash [Normal Affect] : the affect was normal [Skin Lesions] : no skin lesions [Normal Insight/Judgment] : insight and judgment were not intact [Normal Mood] : the mood was normal [de-identified] : sitting in wheelchair, does not engage in conversation but remains very pleasant. Thin.  [FreeTextEntry1] : no erythema; scant dried discharge from left eye - non-purulent. no conjunctival injection [de-identified] : bilateral cerumen impaction [de-identified] : no obvious FND; pt unable to participate in full neurologic examination

## 2023-05-08 NOTE — REVIEW OF SYSTEMS
[Fever] : no fever [Chills] : no chills [Feeling Poorly] : not feeling poorly [Recent Weight Loss (___ Lbs)] : recent [unfilled] ~Ulb weight loss [Heart Rate Is Fast] : the heart rate was not fast [Chest Pain] : no chest pain [Lower Ext Edema] : no lower extremity edema [Shortness Of Breath] : no shortness of breath [Cough] : no cough [Abdominal Pain] : no abdominal pain [Vomiting] : no vomiting [Constipation] : no constipation [Diarrhea] : no diarrhea [Melena] : no melena [Dizziness] : no dizziness [Fainting] : no fainting [Negative] : Genitourinary [FreeTextEntry2] : 3lbs

## 2023-05-08 NOTE — HISTORY OF PRESENT ILLNESS
[FreeTextEntry1] : Mrs. Dunaway is an 83yo female with PMHX: CVA, advanced dementia mixed Alz/vascular dx in 8720-1207, HTN no longer on antihypertensives, recurrent UTI. \par \par Presents with daughter, Alma Rosa, who provides all history \par Caodaism affiliation: [employer] \par \par Today offers no acute complaints\par eating well, weight is down 3lbs from 2 months prior despite eating 3 full meals per day\par has been napping more\par also more functional gait impairment, 2-person assist for transfers\par unable to use walker due to severe cog impairment, unable to use rollator to coordinate motion and hand brakes \par pt appears calm and pleasant during encounter, laughs intermittently but largely non-verbal\par sleeping well\par no wounds or skin breakdown\par has HHA 24/7 \par \par #Advanced dementia:\par FAST 7A \par dx in 0053-2481 with progression in 2014 after he  passed\par [facility] to [facility], last visit was roughly 2021 \par Has 24/7 live-in aids through medicaid benefit \par Dependent in all ADLS except is able to feed herself \par dependentl in all IADLs \par was on fluphenzine/benztropine for behavioral disturbances but this was discontinued since prior hospitalization\par behaviors have been stable, no sig sx reported \par \par HTN:\par  was previously on 3 agents but all were discontinued during hospitalization in Dec 2022 \par BP has remained well controlled off meds\par \par Abscess:\par March 2022 hospitalized for thigh abscess, tx with IVABX, resolved\par no recurrent wounds since then\par has air-cushion on bed which helps \par \par Recurrent UTI:\par s/s include changes in patient behavior, +irritable/agitation/restless\par multiple ED visits for UTI, responds well to ABX therapy \par no s/s of UTI recently, dtr states patient is at baseline behavios and mentation \par \par Comprehensive geriatric assessment: \par Grew up in [place], [place], moved to [place] at 19\par 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 \par 3 children: Alma Rosa (youngest) Emmanuel (Lives in [place]), Ann Marie ([place])\par  in 2015, was primary caregiver for her  \par after her  passed, she moved in with Alma Rosa  \par One floor apartment, own room\par has hospital bed \par shower chair \par ambulates with walker but mostly sits in chair \par Mobility/functional status: ambulates with walker \par Mind/mentation: no tearfulness, agitation, sleep disturbances reported\par Medication: HHA and dtr manage and assists\par \par ACP: discussed today, DAVID completed 5/8/2023: DNI/DNR/NFT - wishes also demonstrated in living will provided via copy in office today\par HCP: daughter Alma Rosa as primary\par  [No falls in past year] : Patient reported no falls in the past year [Full assistance needed] : Assistance needed managing medications [FAST Score: ____] : Functional Assessment Scale (FAST) Score: [unfilled] [Walker] : walker [Hospital Bed] : hospital bed [Shower Chair] : shower chair [FreeTextEntry8] : uses prima-wick at home [Severe] : Stage: Severe [Stable] : Status: Stable [Memory Lapses Or Loss] : stable memory impairment [Patient Observed To Be Agitated] : denies agitation [Hostility Toward Caregivers] : denies aggression [Sleep Disturbances] : denies sleep disturbances [] : denies wandering [Fixed Beliefs Contradicted By Reality (Delusions)] : denies delusions [Difficulty Finding Desired Words] : worsened difficulty finding desired words [With Patient/Caregiver] : , with patient/caregiver [I will adhere to the patient's wishes.] : I will adhere to the patient's wishes. [Time Spent: ___ minutes] : Time Spent: [unfilled] minutes [AdvancecareDate] : 5/8/2023 [FreeTextEntry4] : MOLST completed today, living will reviewed and DNR/DNI/NFT/Limited medical interventions in concordance with patient's wishes documented in living will. HCP also reviewed, Daughter Alma Rosa is primary. All questions answered, anticipatory guidance provided, introduced hospice care as possible plan of care as disease progresses. Alma Rosa is thankful for information and ACP discussion. copies scanned in and copies provided

## 2023-06-02 NOTE — PATIENT PROFILE ADULT - OVER THE PAST TWO WEEKS HAVE YOU FELT DOWN, DEPRESSED OR HOPELESS?
You were seen in ED for evaluation of a hernia. Monitor site closely, avoid heavy lifting, wear supportive underwear. Follow up with primary care provider for persistent symptoms. Seek medical attention sooner if you develop fevers, chest pain, shortness of breath, vomiting, abdominal pain, inability to push hernia in, redness, difficulty urinating, or symptoms worsen.  
no

## 2023-08-08 NOTE — PATIENT PROFILE ADULT - NSPROMEDSADMININFO_GEN_A_NUR
no concerns Hemigard Postcare Instructions: The HEMIGARD strips are to remain completely dry for at least 5-7 days.

## 2023-09-18 ENCOUNTER — APPOINTMENT (OUTPATIENT)
Dept: GERIATRICS | Facility: CLINIC | Age: 83
End: 2023-09-18
Payer: MEDICARE

## 2023-09-18 VITALS
SYSTOLIC BLOOD PRESSURE: 135 MMHG | OXYGEN SATURATION: 99 % | DIASTOLIC BLOOD PRESSURE: 78 MMHG | TEMPERATURE: 97.9 F | BODY MASS INDEX: 16.05 KG/M2 | RESPIRATION RATE: 16 BRPM | WEIGHT: 94 LBS | HEART RATE: 76 BPM | HEIGHT: 64 IN

## 2023-09-18 DIAGNOSIS — R79.89 OTHER SPECIFIED ABNORMAL FINDINGS OF BLOOD CHEMISTRY: ICD-10-CM

## 2023-09-18 DIAGNOSIS — R73.01 IMPAIRED FASTING GLUCOSE: ICD-10-CM

## 2023-09-18 PROCEDURE — 99204 OFFICE O/P NEW MOD 45 MIN: CPT

## 2023-09-18 RX ORDER — DONEPEZIL HYDROCHLORIDE 5 MG/1
5 TABLET ORAL
Qty: 15 | Refills: 1 | Status: DISCONTINUED | COMMUNITY
Start: 2023-03-06 | End: 2023-09-18

## 2023-09-19 LAB
25(OH)D3 SERPL-MCNC: 104 NG/ML
ALBUMIN SERPL ELPH-MCNC: 4.1 G/DL
ALP BLD-CCNC: 114 U/L
ALT SERPL-CCNC: 12 U/L
ANION GAP SERPL CALC-SCNC: 11 MMOL/L
AST SERPL-CCNC: 19 U/L
BILIRUB SERPL-MCNC: 0.2 MG/DL
BUN SERPL-MCNC: 10 MG/DL
CALCIUM SERPL-MCNC: 9.5 MG/DL
CHLORIDE SERPL-SCNC: 103 MMOL/L
CO2 SERPL-SCNC: 26 MMOL/L
CREAT SERPL-MCNC: 0.58 MG/DL
EGFR: 90 ML/MIN/1.73M2
ESTIMATED AVERAGE GLUCOSE: 103 MG/DL
FOLATE SERPL-MCNC: >20 NG/ML
GLUCOSE SERPL-MCNC: 57 MG/DL
HBA1C MFR BLD HPLC: 5.2 %
HCT VFR BLD CALC: 35.1 %
HGB BLD-MCNC: 11.5 G/DL
MCHC RBC-ENTMCNC: 30.3 PG
MCHC RBC-ENTMCNC: 32.8 GM/DL
MCV RBC AUTO: 92.4 FL
PLATELET # BLD AUTO: 186 K/UL
POTASSIUM SERPL-SCNC: 4.1 MMOL/L
PROT SERPL-MCNC: 6.6 G/DL
RBC # BLD: 3.8 M/UL
RBC # FLD: 16.2 %
SODIUM SERPL-SCNC: 140 MMOL/L
TSH SERPL-ACNC: 1.83 UIU/ML
VIT B12 SERPL-MCNC: 1909 PG/ML
WBC # FLD AUTO: 7.71 K/UL

## 2023-10-14 NOTE — ED ADULT NURSE NOTE - CHIEF COMPLAINT QUOTE
Attending Neadelfoo: UA neg. CT a/p and labs ordered for further eval of pain Pt brought in by family to r/o for UTI. Family notices that patient is warmer than usual, grinding her teeth and grimacing today which is consistent with her UTI symptoms. Took tylenol at 5PM today. Pmhx of chronic UTI, CVA, dementia. A&Ox1 currently at baseline. Attending Joanieo: CT w/ no emergent findings. possible colitis, however no diarrhea/fevers and abdomen is non tender. will defer treatment at this time. prostate is showing enlarged on CT. informed pt of this. agreeable w/ DC and outpatient uro f/u. return precautions provided and discussed.

## 2023-10-30 NOTE — ED ADULT NURSE NOTE - IS THE PATIENT ABLE TO BE SCREENED?
No Plan: Recheck in 3-4 weeks. Detail Level: Zone Render In Strict Bullet Format?: No Initiate Treatment: Apply clobetasol 0.05% scalp solution to affected areas BID x 1-2 weeks, then QD x 1 week as needed for flares. Initiate Treatment: Take one capsule doxycycline 100mg PO BID with food and water x 2 weeks. Plan: Patient deferred I&D today. \\nRTC in 2-3 weeks to recheck if still persisting.

## 2023-11-22 ENCOUNTER — APPOINTMENT (OUTPATIENT)
Dept: GERIATRICS | Facility: CLINIC | Age: 83
End: 2023-11-22
Payer: MEDICARE

## 2023-11-22 VITALS
SYSTOLIC BLOOD PRESSURE: 161 MMHG | HEART RATE: 74 BPM | OXYGEN SATURATION: 99 % | TEMPERATURE: 97.6 F | RESPIRATION RATE: 15 BRPM | WEIGHT: 95 LBS | DIASTOLIC BLOOD PRESSURE: 74 MMHG | BODY MASS INDEX: 16.31 KG/M2

## 2023-11-22 VITALS — SYSTOLIC BLOOD PRESSURE: 140 MMHG | DIASTOLIC BLOOD PRESSURE: 75 MMHG

## 2023-11-22 DIAGNOSIS — Z71.89 OTHER SPECIFIED COUNSELING: ICD-10-CM

## 2023-11-22 DIAGNOSIS — F03.C0 UNSPECIFIED DEMENTIA, SEVERE, WITHOUT BEHAVIORAL DISTURBANCE, PSYCHOTIC DISTURBANCE, MOOD DISTURBANCE, AND ANXIETY: ICD-10-CM

## 2023-11-22 DIAGNOSIS — Z23 ENCOUNTER FOR IMMUNIZATION: ICD-10-CM

## 2023-11-22 DIAGNOSIS — R26.9 UNSPECIFIED ABNORMALITIES OF GAIT AND MOBILITY: ICD-10-CM

## 2023-11-22 PROCEDURE — 99214 OFFICE O/P EST MOD 30 MIN: CPT

## 2023-12-08 NOTE — ED PROVIDER NOTE - TOBACCO USE
Last Appointment:  7/17/2023  Future Appointments   Date Time Provider [address]   2/13/2024 10:15 AM Octavio Conway MD [facility]
Never smoker

## 2024-03-10 NOTE — PATIENT PROFILE ADULT - BRAND OF COVID-19 VACCINATION
[FreeTextEntry1] : I, Nena Garcia, acted as a scribe on behalf of Dr. Church 02/22/2024.
Pfizer dose 1 and 2

## 2024-05-06 ENCOUNTER — APPOINTMENT (OUTPATIENT)
Dept: GERIATRICS | Facility: CLINIC | Age: 84
End: 2024-05-06

## 2024-05-30 NOTE — SWALLOW BEDSIDE ASSESSMENT ADULT - ASR SWALLOW ASPIRATION MONITOR
Appointment canceled for Betty Sharif ([id number])  Visit Type: POST-OP VISIT  Date        Time      Length    Provider                  Department  7/1/2024     2:30 PM  15 mins.  DO KERI Beasley [facility]     Reason for Cancellation: Too sick to come in     Patient Comments: Went on hospice     *Routing as FYI*  
I called and spoke with her daughter, Fadia.  Olivia no longer has rest pain in the left leg, and her toe amputation site has healed.  Sutures to be removed by hospice nurse this week.  Olivia no longer wanted to continue with dialysis and has elected to enter hospice.  Fadia has taken Olivia home from [facility]  
Tisha Cuenca with [facility] requesting orders to remove sutures.  Per Dr. Hubbard's note below ok to remove this week.   
change of breathing pattern/cough/gurgly voice/fever/pneumonia

## 2024-09-16 ENCOUNTER — APPOINTMENT (OUTPATIENT)
Dept: GERIATRICS | Facility: CLINIC | Age: 84
End: 2024-09-16
Payer: MEDICARE

## 2024-09-16 VITALS — SYSTOLIC BLOOD PRESSURE: 134 MMHG | DIASTOLIC BLOOD PRESSURE: 80 MMHG

## 2024-09-16 VITALS
WEIGHT: 94.5 LBS | OXYGEN SATURATION: 97 % | HEART RATE: 78 BPM | BODY MASS INDEX: 16.22 KG/M2 | RESPIRATION RATE: 16 BRPM

## 2024-09-16 DIAGNOSIS — R26.9 UNSPECIFIED ABNORMALITIES OF GAIT AND MOBILITY: ICD-10-CM

## 2024-09-16 DIAGNOSIS — F03.C0 UNSPECIFIED DEMENTIA, SEVERE, WITHOUT BEHAVIORAL DISTURBANCE, PSYCHOTIC DISTURBANCE, MOOD DISTURBANCE, AND ANXIETY: ICD-10-CM

## 2024-09-16 DIAGNOSIS — Z71.89 OTHER SPECIFIED COUNSELING: ICD-10-CM

## 2024-09-16 PROCEDURE — 99214 OFFICE O/P EST MOD 30 MIN: CPT

## 2024-09-16 PROCEDURE — G2211 COMPLEX E/M VISIT ADD ON: CPT

## 2024-09-16 RX ORDER — QUETIAPINE FUMARATE 25 MG/1
25 TABLET ORAL
Qty: 15 | Refills: 0 | Status: ACTIVE | COMMUNITY
Start: 2024-09-16 | End: 1900-01-01

## 2024-09-17 DIAGNOSIS — D64.9 ANEMIA, UNSPECIFIED: ICD-10-CM

## 2024-09-17 LAB
25(OH)D3 SERPL-MCNC: 64.4 NG/ML
ALBUMIN SERPL ELPH-MCNC: 3.8 G/DL
ALP BLD-CCNC: 91 U/L
ALT SERPL-CCNC: 14 U/L
ANION GAP SERPL CALC-SCNC: 13 MMOL/L
AST SERPL-CCNC: 17 U/L
BILIRUB SERPL-MCNC: 0.2 MG/DL
BUN SERPL-MCNC: 10 MG/DL
CALCIUM SERPL-MCNC: 9.2 MG/DL
CHLORIDE SERPL-SCNC: 112 MMOL/L
CO2 SERPL-SCNC: 25 MMOL/L
CREAT SERPL-MCNC: 0.72 MG/DL
EGFR: 82 ML/MIN/1.73M2
ESTIMATED AVERAGE GLUCOSE: 97 MG/DL
FOLATE SERPL-MCNC: >20 NG/ML
GLUCOSE SERPL-MCNC: 74 MG/DL
HBA1C MFR BLD HPLC: 5 %
HCT VFR BLD CALC: 32.9 %
HGB BLD-MCNC: 10.8 G/DL
MCHC RBC-ENTMCNC: 29.4 PG
MCHC RBC-ENTMCNC: 32.8 GM/DL
MCV RBC AUTO: 89.6 FL
PLATELET # BLD AUTO: 162 K/UL
POTASSIUM SERPL-SCNC: 3.9 MMOL/L
PROT SERPL-MCNC: 6.7 G/DL
RBC # BLD: 3.67 M/UL
RBC # FLD: 16.2 %
SODIUM SERPL-SCNC: 149 MMOL/L
VIT B12 SERPL-MCNC: 986 PG/ML
WBC # FLD AUTO: 6.84 K/UL

## 2024-09-17 NOTE — ASSESSMENT
[FreeTextEntry1] : - ordered repeat blood work - refused influenza and PNA vaccine - f/u in 6 months or earlier if needed

## 2024-09-17 NOTE — PHYSICAL EXAM
[Alert] : alert [Sclera] : the sclera and conjunctiva were normal [Normal Oral Mucosa] : normal oral mucosa [No Oral Pallor] : no oral pallor [No Oral Cyanosis] : no oral cyanosis [Normal Outer Ear/Nose] : the ears and nose were normal in appearance [Normal Lips/Gums] : the lips and gums were normal [Normal Appearance] : the appearance of the neck was normal [Thyroid Not Enlarged] : the thyroid was not enlarged [No Respiratory Distress] : no respiratory distress [No Acc Muscle Use] : no accessory muscle use [Respiration, Rhythm And Depth] : normal respiratory rhythm and effort [Normal S1, S2] : normal S1 and S2 [Auscultation Breath Sounds / Voice Sounds] : lungs were clear to auscultation bilaterally [Heart Rate And Rhythm] : heart rate was normal and rhythm regular [Edema] : edema was not present [Bowel Sounds] : normal bowel sounds [Abdomen Tenderness] : non-tender [Abdomen Soft] : soft [No Spinal Tenderness] : no spinal tenderness [No CVA Tenderness] : no CVA  tenderness [No Clubbing, Cyanosis] : no clubbing or cyanosis of the fingernails [Involuntary Movements] : no involuntary movements were seen [Motor Tone] : muscle strength and tone were normal [Normal Color / Pigmentation] : normal skin color and pigmentation [] : no rash [Skin Lesions] : no skin lesions [Normal Affect] : the affect was normal [Normal Mood] : the mood was normal [Normal Insight/Judgment] : insight and judgment were not intact [de-identified] : sitting in wheelchair, does not engage in conversation but remains very pleasant. Thin.  [FreeTextEntry1] : no erythema; scant dried discharge from left eye - non-purulent. no conjunctival injection [de-identified] : bilateral cerumen impaction [de-identified] : no obvious FND; pt unable to participate in full neurologic examination

## 2024-09-17 NOTE — REASON FOR VISIT
[Follow-Up] : a follow-up visit [Family Member] : family member [FreeTextEntry1] : follow up [FreeTextEntry3] : [address]

## 2024-09-17 NOTE — HISTORY OF PRESENT ILLNESS
[No falls in past year] : Patient reported no falls in the past year [Full assistance needed] : Assistance needed managing medications [FAST Score: ____] : Functional Assessment Scale (FAST) Score: [unfilled] [Walker] : walker [Hospital Bed] : hospital bed [Shower Chair] : shower chair [0] : 2) Feeling down, depressed, or hopeless: Not at all (0) [PHQ-2 Negative - No further assessment needed] : PHQ-2 Negative - No further assessment needed [Severe] : Stage: Severe [Stable] : Status: Stable [Memory Lapses Or Loss] : stable memory impairment [Patient Observed To Be Agitated] : denies agitation [Hostility Toward Caregivers] : denies aggression [Sleep Disturbances] : denies sleep disturbances [] : denies wandering [Fixed Beliefs Contradicted By Reality (Delusions)] : denies delusions [Difficulty Finding Desired Words] : worsened difficulty finding desired words [With Patient/Caregiver] : , with patient/caregiver [I will adhere to the patient's wishes.] : I will adhere to the patient's wishes. [FreeTextEntry1] : Mrs. Dunaway is an 85 yo female with PMHX: CVA, advanced dementia mixed Alz/vascular dx in 6490-5303, HTN no longer on antihypertensives, recurrent UTI.   Presents with daughter, Alma Rosa, who provides all history  Episcopalian affiliation: Religion   Since last visit she reported had 1 episode of spotting of blood when she was changed possible vagina? episode only happened once eating well, weight is stable since last visit  has been napping more also more functional gait impairment, 2-person assist for transfers unable to use walker due to severe cog impairment, unable to use rollator to coordinate motion and hand brakes  pt appears calm and pleasant during encounter, laughs intermittently but largely non-verbal sleeping well no wounds or skin breakdown has HHA 24/7   #Advanced dementia: FAST 7A  dx in 9062-6024 with progression in 2014 after he  passed used to attend [facility], last visit was roughly 2021  Has 24/7 live-in aids through medicaid benefit  Dependent in all ADLS except is able to feed herself  dependent in all IADLs  was on fluphenzine/benztropine for behavioral disturbances but this was discontinued since prior hospitalization behaviors have been stable, no sig sx reported   HTN:  was previously on 3 agents but all were discontinued during hospitalization in Dec 2022  BP has remained well controlled off meds  Abscess: March 2022 hospitalized for thigh abscess, tx with IVABX, resolved no recurrent wounds since then has air-cushion on bed which helps   Recurrent UTI: s/s include changes in patient behavior, +irritable/agitation/restless multiple ED visits for UTI, responds well to ABX therapy  no s/s of UTI recently, dtr states patient is at baseline behavios and mentation   Comprehensive geriatric assessment:  Grew up in [place], [place], moved to [place] at 19 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  3 children: Alma Rosa (youngest) Emmanuel (Lives in [place]), Ann Marie ([place])  in 2015, was primary caregiver for her   after her  passed, she moved in with Alma Rosa   One floor apartment, own room has hospital bed  shower chair  ambulates with walker but mostly sits in chair  Mobility/functional status: ambulates with walker  Mind/mentation: no tearfulness, agitation, sleep disturbances reported Medication: HHA and dtr manage and assists  ACP: discussed today, DAVID completed 5/8/2023: DNI/DNR/NFT - wishes also demonstrated in living will  HCP: daughter Alma Rosa as primary  [FreeTextEntry8] : uses prima-wick at home [[id number]] : 0 [AdvancecareDate] : 5/8/2023

## 2024-09-17 NOTE — PHYSICAL EXAM
[Alert] : alert [Sclera] : the sclera and conjunctiva were normal [Normal Oral Mucosa] : normal oral mucosa [No Oral Pallor] : no oral pallor [No Oral Cyanosis] : no oral cyanosis [Normal Outer Ear/Nose] : the ears and nose were normal in appearance [Normal Lips/Gums] : the lips and gums were normal [Normal Appearance] : the appearance of the neck was normal [Thyroid Not Enlarged] : the thyroid was not enlarged [No Respiratory Distress] : no respiratory distress [No Acc Muscle Use] : no accessory muscle use [Respiration, Rhythm And Depth] : normal respiratory rhythm and effort [Auscultation Breath Sounds / Voice Sounds] : lungs were clear to auscultation bilaterally [Normal S1, S2] : normal S1 and S2 [Heart Rate And Rhythm] : heart rate was normal and rhythm regular [Edema] : edema was not present [Bowel Sounds] : normal bowel sounds [Abdomen Tenderness] : non-tender [Abdomen Soft] : soft [No Spinal Tenderness] : no spinal tenderness [No CVA Tenderness] : no CVA  tenderness [No Clubbing, Cyanosis] : no clubbing or cyanosis of the fingernails [Involuntary Movements] : no involuntary movements were seen [Motor Tone] : muscle strength and tone were normal [Normal Color / Pigmentation] : normal skin color and pigmentation [] : no rash [Skin Lesions] : no skin lesions [Normal Affect] : the affect was normal [Normal Mood] : the mood was normal [Normal Insight/Judgment] : insight and judgment were not intact [de-identified] : sitting in wheelchair, does not engage in conversation but remains very pleasant. Thin.  [FreeTextEntry1] : no erythema; scant dried discharge from left eye - non-purulent. no conjunctival injection [de-identified] : bilateral cerumen impaction [de-identified] : no obvious FND; pt unable to participate in full neurologic examination

## 2024-09-17 NOTE — REVIEW OF SYSTEMS
[Recent Weight Loss (___ Lbs)] : recent [unfilled] ~Ulb weight loss [Negative] : Genitourinary [Fever] : no fever [Chills] : no chills [Feeling Poorly] : not feeling poorly [Heart Rate Is Fast] : the heart rate was not fast [Chest Pain] : no chest pain [Lower Ext Edema] : no lower extremity edema [Shortness Of Breath] : no shortness of breath [Cough] : no cough [Abdominal Pain] : no abdominal pain [Vomiting] : no vomiting [Constipation] : no constipation [Diarrhea] : no diarrhea [Melena] : no melena [Dizziness] : no dizziness [Fainting] : no fainting [FreeTextEntry2] : 3lbs

## 2024-09-17 NOTE — HISTORY OF PRESENT ILLNESS
[No falls in past year] : Patient reported no falls in the past year [Full assistance needed] : Assistance needed managing medications [FAST Score: ____] : Functional Assessment Scale (FAST) Score: [unfilled] [Walker] : walker [Hospital Bed] : hospital bed [Shower Chair] : shower chair [0] : 2) Feeling down, depressed, or hopeless: Not at all (0) [PHQ-2 Negative - No further assessment needed] : PHQ-2 Negative - No further assessment needed [Severe] : Stage: Severe [Stable] : Status: Stable [Memory Lapses Or Loss] : stable memory impairment [Patient Observed To Be Agitated] : denies agitation [Hostility Toward Caregivers] : denies aggression [Sleep Disturbances] : denies sleep disturbances [] : denies wandering [Fixed Beliefs Contradicted By Reality (Delusions)] : denies delusions [Difficulty Finding Desired Words] : worsened difficulty finding desired words [With Patient/Caregiver] : , with patient/caregiver [I will adhere to the patient's wishes.] : I will adhere to the patient's wishes. [FreeTextEntry1] : Mrs. Dunaway is an 85 yo female with PMHX: CVA, advanced dementia mixed Alz/vascular dx in 4314-7354, HTN no longer on antihypertensives, recurrent UTI.   Presents with daughter, Alma Rosa, who provides all history  [employer] affiliation: [employer]   Since last visit she reported had 1 episode of spotting of blood when she was changed possible vagina? episode only happened once eating well, weight is stable since last visit  has been napping more also more functional gait impairment, 2-person assist for transfers unable to use walker due to severe cog impairment, unable to use rollator to coordinate motion and hand brakes  pt appears calm and pleasant during encounter, laughs intermittently but largely non-verbal sleeping well no wounds or skin breakdown has HHA 24/7   #Advanced dementia: FAST 7A  dx in 9086-9010 with progression in 2014 after he  passed used to attend [facility], last visit was roughly 2021  Has 24/7 live-in aids through medicaid benefit  Dependent in all ADLS except is able to feed herself  dependent in all IADLs  was on fluphenzine/benztropine for behavioral disturbances but this was discontinued since prior hospitalization behaviors have been stable, no sig sx reported   HTN:  was previously on 3 agents but all were discontinued during hospitalization in Dec 2022  BP has remained well controlled off meds  Abscess: March 2022 hospitalized for thigh abscess, tx with IVABX, resolved no recurrent wounds since then has air-cushion on bed which helps   Recurrent UTI: s/s include changes in patient behavior, +irritable/agitation/restless multiple ED visits for UTI, responds well to ABX therapy  no s/s of UTI recently, dtr states patient is at baseline behavios and mentation   Comprehensive geriatric assessment:  Grew up in [place], [place], moved to [place] at 19 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  3 children: Alma Rosa (youngest) Emmanuel (Lives in [place]), Ann Marie ([place])  in 2015, was primary caregiver for her   after her  passed, she moved in with Alma Rosa   One floor apartment, own room has hospital bed  shower chair  ambulates with walker but mostly sits in chair  Mobility/functional status: ambulates with walker  Mind/mentation: no tearfulness, agitation, sleep disturbances reported Medication: HHA and dtr manage and assists  ACP: discussed today, DAVID completed 5/8/2023: DNI/DNR/NFT - wishes also demonstrated in living will  HCP: daughter Alma Rosa as primary  [FreeTextEntry8] : uses [employer] at home [QHI7Pfsgr] : 0 [AdvancecareDate] : 5/8/2023

## 2024-12-23 NOTE — ED PROVIDER NOTE - TEMPLATE, MLM
SW/CM Discharge Plan:    Patient’s discharge destination is Hospice.    The pt is anticipated to dc home, this date (12/23/24). Leonela Marrero MSW, set up an ambulance, [facility] should be here within the hour. Hospice DME has been delivered to the house. [facility] will admit, in the AM. Pt and family in agreement. Pt eager to dc home.    No further SWCM dc needs identified. SW will remain available to assist with any needs that arise.     CIELO Baez, [facility]  Medical Social Worker,    Ph: [phone]     General

## 2025-07-22 ENCOUNTER — NON-APPOINTMENT (OUTPATIENT)
Age: 85
End: 2025-07-22

## 2025-07-24 ENCOUNTER — LABORATORY RESULT (OUTPATIENT)
Age: 85
End: 2025-07-24

## 2025-07-24 ENCOUNTER — APPOINTMENT (OUTPATIENT)
Dept: GERIATRICS | Facility: CLINIC | Age: 85
End: 2025-07-24
Payer: MEDICARE

## 2025-07-24 VITALS
WEIGHT: 88 LBS | TEMPERATURE: 97.8 F | HEIGHT: 64 IN | HEART RATE: 69 BPM | DIASTOLIC BLOOD PRESSURE: 78 MMHG | BODY MASS INDEX: 15.03 KG/M2 | OXYGEN SATURATION: 98 % | RESPIRATION RATE: 16 BRPM | SYSTOLIC BLOOD PRESSURE: 141 MMHG

## 2025-07-24 DIAGNOSIS — N39.0 URINARY TRACT INFECTION, SITE NOT SPECIFIED: ICD-10-CM

## 2025-07-24 DIAGNOSIS — F03.C0 UNSPECIFIED DEMENTIA, SEVERE, WITHOUT BEHAVIORAL DISTURBANCE, PSYCHOTIC DISTURBANCE, MOOD DISTURBANCE, AND ANXIETY: ICD-10-CM

## 2025-07-24 DIAGNOSIS — Z71.89 OTHER SPECIFIED COUNSELING: ICD-10-CM

## 2025-07-24 DIAGNOSIS — R63.4 ABNORMAL WEIGHT LOSS: ICD-10-CM

## 2025-07-24 PROCEDURE — 99215 OFFICE O/P EST HI 40 MIN: CPT

## 2025-07-25 ENCOUNTER — TRANSCRIPTION ENCOUNTER (OUTPATIENT)
Age: 85
End: 2025-07-25

## 2025-07-25 LAB
25(OH)D3 SERPL-MCNC: 53.2 NG/ML
ALBUMIN SERPL ELPH-MCNC: 3.8 G/DL
ALP BLD-CCNC: 114 U/L
ALT SERPL-CCNC: 16 U/L
ANION GAP SERPL CALC-SCNC: 11 MMOL/L
APPEARANCE: ABNORMAL
AST SERPL-CCNC: 17 U/L
BASOPHILS # BLD AUTO: 0.04 K/UL
BASOPHILS NFR BLD AUTO: 0.5 %
BILIRUB SERPL-MCNC: 0.3 MG/DL
BILIRUBIN URINE: NEGATIVE
BLOOD URINE: ABNORMAL
BUN SERPL-MCNC: 10 MG/DL
CALCIUM SERPL-MCNC: 9.7 MG/DL
CHLORIDE SERPL-SCNC: 105 MMOL/L
CO2 SERPL-SCNC: 27 MMOL/L
COLOR: YELLOW
CREAT SERPL-MCNC: 0.64 MG/DL
EGFRCR SERPLBLD CKD-EPI 2021: 87 ML/MIN/1.73M2
EOSINOPHIL # BLD AUTO: 0.14 K/UL
EOSINOPHIL NFR BLD AUTO: 1.9 %
FOLATE SERPL-MCNC: >20 NG/ML
GLUCOSE QUALITATIVE U: NEGATIVE MG/DL
GLUCOSE SERPL-MCNC: 76 MG/DL
HCT VFR BLD CALC: 37.3 %
HGB BLD-MCNC: 12 G/DL
IMM GRANULOCYTES NFR BLD AUTO: 0.1 %
KETONES URINE: NEGATIVE MG/DL
LEUKOCYTE ESTERASE URINE: ABNORMAL
LYMPHOCYTES # BLD AUTO: 2.79 K/UL
LYMPHOCYTES NFR BLD AUTO: 37.8 %
MAN DIFF?: NORMAL
MCHC RBC-ENTMCNC: 29.8 PG
MCHC RBC-ENTMCNC: 32.2 G/DL
MCV RBC AUTO: 92.6 FL
MONOCYTES # BLD AUTO: 0.48 K/UL
MONOCYTES NFR BLD AUTO: 6.5 %
NEUTROPHILS # BLD AUTO: 3.93 K/UL
NEUTROPHILS NFR BLD AUTO: 53.2 %
NITRITE URINE: NEGATIVE
PH URINE: 8
PLATELET # BLD AUTO: 167 K/UL
POTASSIUM SERPL-SCNC: 4.7 MMOL/L
PROT SERPL-MCNC: 6.7 G/DL
PROTEIN URINE: NORMAL MG/DL
RBC # BLD: 4.03 M/UL
RBC # FLD: 15.7 %
SODIUM SERPL-SCNC: 143 MMOL/L
SPECIFIC GRAVITY URINE: 1.01
TSH SERPL-ACNC: 1.25 UIU/ML
UROBILINOGEN URINE: 0.2 MG/DL
VIT B12 SERPL-MCNC: 907 PG/ML
WBC # FLD AUTO: 7.39 K/UL

## 2025-07-25 RX ORDER — CEPHALEXIN 250 MG/5ML
250 FOR SUSPENSION ORAL TWICE DAILY
Qty: 1 | Refills: 0 | Status: ACTIVE | COMMUNITY
Start: 2025-07-25 | End: 1900-01-01

## 2025-07-29 PROBLEM — R63.4 WEIGHT LOSS: Status: ACTIVE | Noted: 2025-07-29

## 2025-07-29 PROBLEM — N39.0 ACUTE UTI: Status: ACTIVE | Noted: 2025-07-25

## 2025-09-03 ENCOUNTER — APPOINTMENT (OUTPATIENT)
Dept: GERIATRICS | Facility: CLINIC | Age: 85
End: 2025-09-03

## 2025-09-03 ENCOUNTER — LABORATORY RESULT (OUTPATIENT)
Age: 85
End: 2025-09-03

## 2025-09-03 RX ORDER — CEFPODOXIME PROXETIL 100 MG/5ML
100 GRANULE, FOR SUSPENSION ORAL
Qty: 1 | Refills: 0 | Status: ACTIVE | COMMUNITY
Start: 2025-09-03 | End: 1900-01-01

## 2025-09-03 RX ORDER — NITROFURANTOIN 50 MG/5ML
50 SUSPENSION ORAL
Qty: 100 | Refills: 0 | Status: ACTIVE | COMMUNITY
Start: 2025-09-03 | End: 1900-01-01

## 2025-09-08 ENCOUNTER — APPOINTMENT (OUTPATIENT)
Dept: GERIATRICS | Facility: CLINIC | Age: 85
End: 2025-09-08
Payer: MEDICARE

## 2025-09-08 VITALS
BODY MASS INDEX: 17.51 KG/M2 | DIASTOLIC BLOOD PRESSURE: 74 MMHG | SYSTOLIC BLOOD PRESSURE: 120 MMHG | WEIGHT: 102 LBS | HEART RATE: 69 BPM | RESPIRATION RATE: 14 BRPM | TEMPERATURE: 97.2 F | OXYGEN SATURATION: 99 %

## 2025-09-08 DIAGNOSIS — F03.C0 UNSPECIFIED DEMENTIA, SEVERE, WITHOUT BEHAVIORAL DISTURBANCE, PSYCHOTIC DISTURBANCE, MOOD DISTURBANCE, AND ANXIETY: ICD-10-CM

## 2025-09-08 DIAGNOSIS — Z71.89 OTHER SPECIFIED COUNSELING: ICD-10-CM

## 2025-09-08 DIAGNOSIS — R63.4 ABNORMAL WEIGHT LOSS: ICD-10-CM

## 2025-09-08 DIAGNOSIS — R26.9 UNSPECIFIED ABNORMALITIES OF GAIT AND MOBILITY: ICD-10-CM

## 2025-09-08 DIAGNOSIS — L89.151 PRESSURE ULCER OF SACRAL REGION, STAGE 1: ICD-10-CM

## 2025-09-08 DIAGNOSIS — N39.0 URINARY TRACT INFECTION, SITE NOT SPECIFIED: ICD-10-CM

## 2025-09-08 PROCEDURE — G2211 COMPLEX E/M VISIT ADD ON: CPT

## 2025-09-08 PROCEDURE — 99215 OFFICE O/P EST HI 40 MIN: CPT

## 2025-09-10 LAB
APPEARANCE: ABNORMAL
BILIRUBIN URINE: NEGATIVE
BLOOD URINE: ABNORMAL
COLOR: YELLOW
GLUCOSE QUALITATIVE U: NEGATIVE MG/DL
KETONES URINE: NEGATIVE MG/DL
LEUKOCYTE ESTERASE URINE: ABNORMAL
NITRITE URINE: NEGATIVE
PH URINE: >=9
PROTEIN URINE: 100 MG/DL
SPECIFIC GRAVITY URINE: 1.02
UROBILINOGEN URINE: 0.2 MG/DL